# Patient Record
Sex: FEMALE | Race: WHITE | NOT HISPANIC OR LATINO | Employment: OTHER | URBAN - METROPOLITAN AREA
[De-identification: names, ages, dates, MRNs, and addresses within clinical notes are randomized per-mention and may not be internally consistent; named-entity substitution may affect disease eponyms.]

---

## 2017-03-22 ENCOUNTER — TRANSCRIBE ORDERS (OUTPATIENT)
Dept: LAB | Facility: CLINIC | Age: 82
End: 2017-03-22

## 2017-03-22 ENCOUNTER — APPOINTMENT (OUTPATIENT)
Dept: LAB | Facility: CLINIC | Age: 82
End: 2017-03-22
Payer: MEDICARE

## 2017-03-22 DIAGNOSIS — I10 ESSENTIAL HYPERTENSION, MALIGNANT: Primary | ICD-10-CM

## 2017-03-22 DIAGNOSIS — E55.9 UNSPECIFIED VITAMIN D DEFICIENCY: ICD-10-CM

## 2017-03-22 DIAGNOSIS — E78.5 HYPERLIPIDEMIA, UNSPECIFIED HYPERLIPIDEMIA TYPE: ICD-10-CM

## 2017-03-22 DIAGNOSIS — Z79.899 ENCOUNTER FOR LONG-TERM (CURRENT) USE OF OTHER MEDICATIONS: ICD-10-CM

## 2017-03-22 LAB
25(OH)D3 SERPL-MCNC: 34.6 NG/ML (ref 30–100)
ALBUMIN SERPL BCP-MCNC: 3.9 G/DL (ref 3.5–5)
ALP SERPL-CCNC: 50 U/L (ref 46–116)
ALT SERPL W P-5'-P-CCNC: 26 U/L (ref 12–78)
ANION GAP SERPL CALCULATED.3IONS-SCNC: 6 MMOL/L (ref 4–13)
AST SERPL W P-5'-P-CCNC: 24 U/L (ref 5–45)
BASOPHILS # BLD AUTO: 0.03 THOUSANDS/ΜL (ref 0–0.1)
BASOPHILS NFR BLD AUTO: 0 % (ref 0–1)
BILIRUB SERPL-MCNC: 0.49 MG/DL (ref 0.2–1)
BUN SERPL-MCNC: 18 MG/DL (ref 5–25)
CALCIUM SERPL-MCNC: 9.1 MG/DL (ref 8.3–10.1)
CHLORIDE SERPL-SCNC: 106 MMOL/L (ref 100–108)
CHOLEST SERPL-MCNC: 160 MG/DL (ref 50–200)
CO2 SERPL-SCNC: 29 MMOL/L (ref 21–32)
CREAT SERPL-MCNC: 0.55 MG/DL (ref 0.6–1.3)
EOSINOPHIL # BLD AUTO: 0.11 THOUSAND/ΜL (ref 0–0.61)
EOSINOPHIL NFR BLD AUTO: 2 % (ref 0–6)
ERYTHROCYTE [DISTWIDTH] IN BLOOD BY AUTOMATED COUNT: 14.5 % (ref 11.6–15.1)
GFR SERPL CREATININE-BSD FRML MDRD: >60 ML/MIN/1.73SQ M
GLUCOSE P FAST SERPL-MCNC: 85 MG/DL (ref 65–99)
HCT VFR BLD AUTO: 40.2 % (ref 34.8–46.1)
HDLC SERPL-MCNC: 55 MG/DL (ref 40–60)
HGB BLD-MCNC: 13.4 G/DL (ref 11.5–15.4)
LDLC SERPL CALC-MCNC: 93 MG/DL (ref 0–100)
LYMPHOCYTES # BLD AUTO: 1.45 THOUSANDS/ΜL (ref 0.6–4.47)
LYMPHOCYTES NFR BLD AUTO: 20 % (ref 14–44)
MCH RBC QN AUTO: 31.6 PG (ref 26.8–34.3)
MCHC RBC AUTO-ENTMCNC: 33.3 G/DL (ref 31.4–37.4)
MCV RBC AUTO: 95 FL (ref 82–98)
MONOCYTES # BLD AUTO: 0.72 THOUSAND/ΜL (ref 0.17–1.22)
MONOCYTES NFR BLD AUTO: 10 % (ref 4–12)
NEUTROPHILS # BLD AUTO: 5.09 THOUSANDS/ΜL (ref 1.85–7.62)
NEUTS SEG NFR BLD AUTO: 68 % (ref 43–75)
NRBC BLD AUTO-RTO: 0 /100 WBCS
PLATELET # BLD AUTO: 228 THOUSANDS/UL (ref 149–390)
PMV BLD AUTO: 11.1 FL (ref 8.9–12.7)
POTASSIUM SERPL-SCNC: 4.1 MMOL/L (ref 3.5–5.3)
PROT SERPL-MCNC: 7.1 G/DL (ref 6.4–8.2)
RBC # BLD AUTO: 4.24 MILLION/UL (ref 3.81–5.12)
SODIUM SERPL-SCNC: 141 MMOL/L (ref 136–145)
TRIGL SERPL-MCNC: 61 MG/DL
WBC # BLD AUTO: 7.42 THOUSAND/UL (ref 4.31–10.16)

## 2017-03-22 PROCEDURE — 36415 COLL VENOUS BLD VENIPUNCTURE: CPT

## 2017-03-22 PROCEDURE — 80053 COMPREHEN METABOLIC PANEL: CPT

## 2017-03-22 PROCEDURE — 82306 VITAMIN D 25 HYDROXY: CPT

## 2017-03-22 PROCEDURE — 80061 LIPID PANEL: CPT

## 2017-03-22 PROCEDURE — 85025 COMPLETE CBC W/AUTO DIFF WBC: CPT

## 2018-06-18 ENCOUNTER — TRANSCRIBE ORDERS (OUTPATIENT)
Dept: LAB | Facility: CLINIC | Age: 83
End: 2018-06-18

## 2018-06-18 ENCOUNTER — APPOINTMENT (OUTPATIENT)
Dept: LAB | Facility: CLINIC | Age: 83
End: 2018-06-18
Payer: MEDICARE

## 2018-06-18 DIAGNOSIS — E55.9 AVITAMINOSIS D: ICD-10-CM

## 2018-06-18 DIAGNOSIS — R74.8 ACID PHOSPHATASE ELEVATED: ICD-10-CM

## 2018-06-18 DIAGNOSIS — I10 ESSENTIAL HYPERTENSION, MALIGNANT: ICD-10-CM

## 2018-06-18 DIAGNOSIS — I10 ESSENTIAL HYPERTENSION, MALIGNANT: Primary | ICD-10-CM

## 2018-06-18 LAB
25(OH)D3 SERPL-MCNC: 48.7 NG/ML (ref 30–100)
ALBUMIN SERPL BCP-MCNC: 4 G/DL (ref 3.5–5)
ALP SERPL-CCNC: 49 U/L (ref 46–116)
ALT SERPL W P-5'-P-CCNC: 23 U/L (ref 12–78)
ANION GAP SERPL CALCULATED.3IONS-SCNC: 5 MMOL/L (ref 4–13)
AST SERPL W P-5'-P-CCNC: 21 U/L (ref 5–45)
BASOPHILS # BLD AUTO: 0.02 THOUSANDS/ΜL (ref 0–0.1)
BASOPHILS NFR BLD AUTO: 0 % (ref 0–1)
BILIRUB SERPL-MCNC: 0.43 MG/DL (ref 0.2–1)
BUN SERPL-MCNC: 17 MG/DL (ref 5–25)
CALCIUM SERPL-MCNC: 8.6 MG/DL (ref 8.3–10.1)
CHLORIDE SERPL-SCNC: 106 MMOL/L (ref 100–108)
CHOLEST SERPL-MCNC: 153 MG/DL (ref 50–200)
CO2 SERPL-SCNC: 28 MMOL/L (ref 21–32)
CREAT SERPL-MCNC: 0.63 MG/DL (ref 0.6–1.3)
EOSINOPHIL # BLD AUTO: 0.06 THOUSAND/ΜL (ref 0–0.61)
EOSINOPHIL NFR BLD AUTO: 1 % (ref 0–6)
ERYTHROCYTE [DISTWIDTH] IN BLOOD BY AUTOMATED COUNT: 14.5 % (ref 11.6–15.1)
EST. AVERAGE GLUCOSE BLD GHB EST-MCNC: 128 MG/DL
GFR SERPL CREATININE-BSD FRML MDRD: 83 ML/MIN/1.73SQ M
GLUCOSE P FAST SERPL-MCNC: 85 MG/DL (ref 65–99)
HBA1C MFR BLD: 6.1 % (ref 4.2–6.3)
HCT VFR BLD AUTO: 40.7 % (ref 34.8–46.1)
HDLC SERPL-MCNC: 52 MG/DL (ref 40–60)
HGB BLD-MCNC: 12.6 G/DL (ref 11.5–15.4)
IMM GRANULOCYTES # BLD AUTO: 0.02 THOUSAND/UL (ref 0–0.2)
IMM GRANULOCYTES NFR BLD AUTO: 0 % (ref 0–2)
LDLC SERPL CALC-MCNC: 90 MG/DL (ref 0–100)
LYMPHOCYTES # BLD AUTO: 1.37 THOUSANDS/ΜL (ref 0.6–4.47)
LYMPHOCYTES NFR BLD AUTO: 19 % (ref 14–44)
MCH RBC QN AUTO: 31 PG (ref 26.8–34.3)
MCHC RBC AUTO-ENTMCNC: 31 G/DL (ref 31.4–37.4)
MCV RBC AUTO: 100 FL (ref 82–98)
MONOCYTES # BLD AUTO: 0.65 THOUSAND/ΜL (ref 0.17–1.22)
MONOCYTES NFR BLD AUTO: 9 % (ref 4–12)
NEUTROPHILS # BLD AUTO: 5.13 THOUSANDS/ΜL (ref 1.85–7.62)
NEUTS SEG NFR BLD AUTO: 71 % (ref 43–75)
NONHDLC SERPL-MCNC: 101 MG/DL
NRBC BLD AUTO-RTO: 0 /100 WBCS
PLATELET # BLD AUTO: 191 THOUSANDS/UL (ref 149–390)
PMV BLD AUTO: 11.3 FL (ref 8.9–12.7)
POTASSIUM SERPL-SCNC: 4 MMOL/L (ref 3.5–5.3)
PROT SERPL-MCNC: 7 G/DL (ref 6.4–8.2)
RBC # BLD AUTO: 4.07 MILLION/UL (ref 3.81–5.12)
SODIUM SERPL-SCNC: 139 MMOL/L (ref 136–145)
TRIGL SERPL-MCNC: 56 MG/DL
WBC # BLD AUTO: 7.25 THOUSAND/UL (ref 4.31–10.16)

## 2018-06-18 PROCEDURE — 80053 COMPREHEN METABOLIC PANEL: CPT

## 2018-06-18 PROCEDURE — 80061 LIPID PANEL: CPT

## 2018-06-18 PROCEDURE — 86803 HEPATITIS C AB TEST: CPT

## 2018-06-18 PROCEDURE — 82306 VITAMIN D 25 HYDROXY: CPT

## 2018-06-18 PROCEDURE — 85025 COMPLETE CBC W/AUTO DIFF WBC: CPT

## 2018-06-18 PROCEDURE — 36415 COLL VENOUS BLD VENIPUNCTURE: CPT

## 2018-06-18 PROCEDURE — 83036 HEMOGLOBIN GLYCOSYLATED A1C: CPT

## 2018-06-19 LAB — HCV AB SER QL: NORMAL

## 2019-05-28 ENCOUNTER — TRANSCRIBE ORDERS (OUTPATIENT)
Dept: ADMINISTRATIVE | Facility: HOSPITAL | Age: 84
End: 2019-05-28

## 2019-05-28 DIAGNOSIS — K21.9 GASTROESOPHAGEAL REFLUX DISEASE WITHOUT ESOPHAGITIS: ICD-10-CM

## 2019-05-28 DIAGNOSIS — R13.10 DYSPHAGIA, UNSPECIFIED TYPE: Primary | ICD-10-CM

## 2019-05-28 DIAGNOSIS — R63.4 ABNORMAL WEIGHT LOSS: ICD-10-CM

## 2019-06-03 ENCOUNTER — HOSPITAL ENCOUNTER (OUTPATIENT)
Dept: RADIOLOGY | Facility: HOSPITAL | Age: 84
Discharge: HOME | End: 2019-06-03
Attending: PHYSICIAN ASSISTANT
Payer: MEDICARE

## 2019-06-03 DIAGNOSIS — R13.10 DYSPHAGIA, UNSPECIFIED TYPE: ICD-10-CM

## 2019-06-03 PROCEDURE — 74220 X-RAY XM ESOPHAGUS 1CNTRST: CPT

## 2019-06-11 ENCOUNTER — TRANSCRIBE ORDERS (OUTPATIENT)
Dept: LAB | Facility: CLINIC | Age: 84
End: 2019-06-11

## 2019-06-11 ENCOUNTER — APPOINTMENT (OUTPATIENT)
Dept: LAB | Facility: CLINIC | Age: 84
End: 2019-06-11
Payer: MEDICARE

## 2019-06-11 ENCOUNTER — ANESTHESIA EVENT (OUTPATIENT)
Dept: GASTROENTEROLOGY | Facility: HOSPITAL | Age: 84
End: 2019-06-11

## 2019-06-11 DIAGNOSIS — R13.10 DYSPHAGIA, UNSPECIFIED TYPE: Primary | ICD-10-CM

## 2019-06-11 DIAGNOSIS — K21.9 GASTROESOPHAGEAL REFLUX DISEASE WITHOUT ESOPHAGITIS: ICD-10-CM

## 2019-06-11 DIAGNOSIS — K44.9 DIAPHRAGMATIC HERNIA WITHOUT OBSTRUCTION AND WITHOUT GANGRENE: ICD-10-CM

## 2019-06-11 DIAGNOSIS — R63.4 ABNORMAL WEIGHT LOSS: ICD-10-CM

## 2019-06-11 LAB
ALBUMIN SERPL BCP-MCNC: 4.1 G/DL (ref 3.5–5)
ALP SERPL-CCNC: 48 U/L (ref 46–116)
ALT SERPL W P-5'-P-CCNC: 22 U/L (ref 12–78)
ANION GAP SERPL CALCULATED.3IONS-SCNC: 4 MMOL/L (ref 4–13)
AST SERPL W P-5'-P-CCNC: 21 U/L (ref 5–45)
BASOPHILS # BLD AUTO: 0.02 THOUSANDS/ΜL (ref 0–0.1)
BASOPHILS NFR BLD AUTO: 0 % (ref 0–1)
BILIRUB SERPL-MCNC: 0.47 MG/DL (ref 0.2–1)
BUN SERPL-MCNC: 8 MG/DL (ref 5–25)
CALCIUM SERPL-MCNC: 8.9 MG/DL (ref 8.3–10.1)
CHLORIDE SERPL-SCNC: 104 MMOL/L (ref 100–108)
CO2 SERPL-SCNC: 29 MMOL/L (ref 21–32)
CREAT SERPL-MCNC: 0.52 MG/DL (ref 0.6–1.3)
EOSINOPHIL # BLD AUTO: 0.02 THOUSAND/ΜL (ref 0–0.61)
EOSINOPHIL NFR BLD AUTO: 0 % (ref 0–6)
ERYTHROCYTE [DISTWIDTH] IN BLOOD BY AUTOMATED COUNT: 14.3 % (ref 11.6–15.1)
GFR SERPL CREATININE-BSD FRML MDRD: 87 ML/MIN/1.73SQ M
GLUCOSE SERPL-MCNC: 95 MG/DL (ref 65–140)
HCT VFR BLD AUTO: 41.5 % (ref 34.8–46.1)
HGB BLD-MCNC: 13.4 G/DL (ref 11.5–15.4)
IMM GRANULOCYTES # BLD AUTO: 0.01 THOUSAND/UL (ref 0–0.2)
IMM GRANULOCYTES NFR BLD AUTO: 0 % (ref 0–2)
LIPASE SERPL-CCNC: 126 U/L (ref 73–393)
LYMPHOCYTES # BLD AUTO: 1.19 THOUSANDS/ΜL (ref 0.6–4.47)
LYMPHOCYTES NFR BLD AUTO: 23 % (ref 14–44)
MCH RBC QN AUTO: 31.4 PG (ref 26.8–34.3)
MCHC RBC AUTO-ENTMCNC: 32.3 G/DL (ref 31.4–37.4)
MCV RBC AUTO: 97 FL (ref 82–98)
MONOCYTES # BLD AUTO: 0.46 THOUSAND/ΜL (ref 0.17–1.22)
MONOCYTES NFR BLD AUTO: 9 % (ref 4–12)
NEUTROPHILS # BLD AUTO: 3.53 THOUSANDS/ΜL (ref 1.85–7.62)
NEUTS SEG NFR BLD AUTO: 68 % (ref 43–75)
NRBC BLD AUTO-RTO: 0 /100 WBCS
PLATELET # BLD AUTO: 203 THOUSANDS/UL (ref 149–390)
PMV BLD AUTO: 10.7 FL (ref 8.9–12.7)
POTASSIUM SERPL-SCNC: 3.6 MMOL/L (ref 3.5–5.3)
PROT SERPL-MCNC: 7.3 G/DL (ref 6.4–8.2)
RBC # BLD AUTO: 4.27 MILLION/UL (ref 3.81–5.12)
SODIUM SERPL-SCNC: 137 MMOL/L (ref 136–145)
WBC # BLD AUTO: 5.23 THOUSAND/UL (ref 4.31–10.16)

## 2019-06-11 PROCEDURE — 80053 COMPREHEN METABOLIC PANEL: CPT

## 2019-06-11 PROCEDURE — 85025 COMPLETE CBC W/AUTO DIFF WBC: CPT

## 2019-06-11 PROCEDURE — 83690 ASSAY OF LIPASE: CPT

## 2019-06-11 PROCEDURE — 36415 COLL VENOUS BLD VENIPUNCTURE: CPT

## 2019-06-11 RX ORDER — FAMOTIDINE 20 MG/1
20 TABLET, FILM COATED ORAL
COMMUNITY
End: 2019-06-13 | Stop reason: HOSPADM

## 2019-06-11 RX ORDER — OMEPRAZOLE 20 MG/1
20 CAPSULE, DELAYED RELEASE ORAL EVERY MORNING
COMMUNITY

## 2019-06-13 ENCOUNTER — ANESTHESIA (OUTPATIENT)
Dept: GASTROENTEROLOGY | Facility: HOSPITAL | Age: 84
End: 2019-06-13

## 2019-06-13 ENCOUNTER — HOSPITAL ENCOUNTER (OUTPATIENT)
Dept: GASTROENTEROLOGY | Facility: HOSPITAL | Age: 84
Setting detail: OUTPATIENT SURGERY
Discharge: HOME | End: 2019-06-13
Attending: INTERNAL MEDICINE | Admitting: INTERNAL MEDICINE
Payer: MEDICARE

## 2019-06-13 VITALS
SYSTOLIC BLOOD PRESSURE: 164 MMHG | BODY MASS INDEX: 18.61 KG/M2 | WEIGHT: 109 LBS | HEART RATE: 74 BPM | OXYGEN SATURATION: 98 % | HEIGHT: 64 IN | TEMPERATURE: 97.1 F | DIASTOLIC BLOOD PRESSURE: 74 MMHG | RESPIRATION RATE: 18 BRPM

## 2019-06-13 DIAGNOSIS — R13.10 DYSPHAGIA: ICD-10-CM

## 2019-06-13 DIAGNOSIS — R63.4 ABNORMAL WEIGHT LOSS: ICD-10-CM

## 2019-06-13 PROCEDURE — 88305 TISSUE EXAM BY PATHOLOGIST: CPT | Performed by: PATHOLOGY

## 2019-06-13 PROCEDURE — 1123F ACP DISCUSS/DSCN MKR DOCD: CPT | Performed by: PATHOLOGY

## 2019-06-13 RX ORDER — SODIUM CHLORIDE, SODIUM LACTATE, POTASSIUM CHLORIDE, CALCIUM CHLORIDE 600; 310; 30; 20 MG/100ML; MG/100ML; MG/100ML; MG/100ML
125 INJECTION, SOLUTION INTRAVENOUS CONTINUOUS
Status: DISCONTINUED | OUTPATIENT
Start: 2019-06-13 | End: 2019-06-17 | Stop reason: HOSPADM

## 2019-06-13 RX ORDER — LIDOCAINE HYDROCHLORIDE 20 MG/ML
INJECTION, SOLUTION EPIDURAL; INFILTRATION; INTRACAUDAL; PERINEURAL AS NEEDED
Status: DISCONTINUED | OUTPATIENT
Start: 2019-06-13 | End: 2019-06-13 | Stop reason: SURG

## 2019-06-13 RX ORDER — PROPOFOL 10 MG/ML
INJECTION, EMULSION INTRAVENOUS AS NEEDED
Status: DISCONTINUED | OUTPATIENT
Start: 2019-06-13 | End: 2019-06-13 | Stop reason: SURG

## 2019-06-13 RX ADMIN — PROPOFOL 100 MG: 10 INJECTION, EMULSION INTRAVENOUS at 13:42

## 2019-06-13 RX ADMIN — LIDOCAINE HYDROCHLORIDE 100 MG: 20 INJECTION, SOLUTION EPIDURAL; INFILTRATION; INTRACAUDAL; PERINEURAL at 13:41

## 2019-06-13 RX ADMIN — SODIUM CHLORIDE, POTASSIUM CHLORIDE, SODIUM LACTATE AND CALCIUM CHLORIDE 125 ML/HR: 600; 310; 30; 20 INJECTION, SOLUTION INTRAVENOUS at 12:04

## 2019-06-13 RX ADMIN — PROPOFOL 50 MG: 10 INJECTION, EMULSION INTRAVENOUS at 13:45

## 2019-06-19 ENCOUNTER — HOSPITAL ENCOUNTER (OUTPATIENT)
Dept: CT IMAGING | Facility: HOSPITAL | Age: 84
Discharge: HOME | End: 2019-06-19
Attending: PHYSICIAN ASSISTANT
Payer: MEDICARE

## 2019-06-19 DIAGNOSIS — R13.10 DYSPHAGIA, UNSPECIFIED TYPE: ICD-10-CM

## 2019-06-19 DIAGNOSIS — R63.4 ABNORMAL WEIGHT LOSS: ICD-10-CM

## 2019-06-19 DIAGNOSIS — K21.9 GASTROESOPHAGEAL REFLUX DISEASE WITHOUT ESOPHAGITIS: ICD-10-CM

## 2019-06-19 PROCEDURE — 74178 CT ABD&PLV WO CNTR FLWD CNTR: CPT

## 2019-06-19 RX ADMIN — IOHEXOL 100 ML: 350 INJECTION, SOLUTION INTRAVENOUS at 13:39

## 2019-07-02 ENCOUNTER — APPOINTMENT (OUTPATIENT)
Dept: LAB | Facility: CLINIC | Age: 84
End: 2019-07-02
Payer: MEDICARE

## 2019-07-02 ENCOUNTER — TRANSCRIBE ORDERS (OUTPATIENT)
Dept: LAB | Facility: CLINIC | Age: 84
End: 2019-07-02

## 2019-07-02 DIAGNOSIS — Z79.899 ENCOUNTER FOR LONG-TERM (CURRENT) USE OF OTHER MEDICATIONS: ICD-10-CM

## 2019-07-02 DIAGNOSIS — R71.8 ELEVATED MCV: ICD-10-CM

## 2019-07-02 DIAGNOSIS — E78.5 HYPERLIPIDEMIA, UNSPECIFIED HYPERLIPIDEMIA TYPE: ICD-10-CM

## 2019-07-02 DIAGNOSIS — R73.9 HYPERGLYCEMIA: ICD-10-CM

## 2019-07-02 DIAGNOSIS — Z79.899 ENCOUNTER FOR LONG-TERM (CURRENT) USE OF OTHER MEDICATIONS: Primary | ICD-10-CM

## 2019-07-02 DIAGNOSIS — E55.9 AVITAMINOSIS D: ICD-10-CM

## 2019-07-02 LAB
25(OH)D3 SERPL-MCNC: 45 NG/ML (ref 30–100)
ALBUMIN SERPL BCP-MCNC: 4.2 G/DL (ref 3.5–5)
ALP SERPL-CCNC: 49 U/L (ref 46–116)
ALT SERPL W P-5'-P-CCNC: 30 U/L (ref 12–78)
ANION GAP SERPL CALCULATED.3IONS-SCNC: 7 MMOL/L (ref 4–13)
AST SERPL W P-5'-P-CCNC: 22 U/L (ref 5–45)
BILIRUB SERPL-MCNC: 0.38 MG/DL (ref 0.2–1)
BUN SERPL-MCNC: 7 MG/DL (ref 5–25)
CALCIUM SERPL-MCNC: 9.2 MG/DL (ref 8.3–10.1)
CHLORIDE SERPL-SCNC: 105 MMOL/L (ref 100–108)
CHOLEST SERPL-MCNC: 160 MG/DL (ref 50–200)
CO2 SERPL-SCNC: 29 MMOL/L (ref 21–32)
CREAT SERPL-MCNC: 0.55 MG/DL (ref 0.6–1.3)
ERYTHROCYTE [DISTWIDTH] IN BLOOD BY AUTOMATED COUNT: 14.7 % (ref 11.6–15.1)
EST. AVERAGE GLUCOSE BLD GHB EST-MCNC: 120 MG/DL
FERRITIN SERPL-MCNC: 309 NG/ML (ref 8–388)
FOLATE SERPL-MCNC: >20 NG/ML (ref 3.1–17.5)
GFR SERPL CREATININE-BSD FRML MDRD: 86 ML/MIN/1.73SQ M
GLUCOSE P FAST SERPL-MCNC: 89 MG/DL (ref 65–99)
HBA1C MFR BLD: 5.8 % (ref 4.2–6.3)
HCT VFR BLD AUTO: 42.4 % (ref 34.8–46.1)
HDLC SERPL-MCNC: 60 MG/DL (ref 40–60)
HGB BLD-MCNC: 13.6 G/DL (ref 11.5–15.4)
IRON SERPL-MCNC: 79 UG/DL (ref 50–170)
LDLC SERPL CALC-MCNC: 86 MG/DL (ref 0–100)
MCH RBC QN AUTO: 31.9 PG (ref 26.8–34.3)
MCHC RBC AUTO-ENTMCNC: 32.1 G/DL (ref 31.4–37.4)
MCV RBC AUTO: 99 FL (ref 82–98)
NONHDLC SERPL-MCNC: 100 MG/DL
PLATELET # BLD AUTO: 225 THOUSANDS/UL (ref 149–390)
PMV BLD AUTO: 11.7 FL (ref 8.9–12.7)
POTASSIUM SERPL-SCNC: 3.7 MMOL/L (ref 3.5–5.3)
PROT SERPL-MCNC: 7.3 G/DL (ref 6.4–8.2)
RBC # BLD AUTO: 4.27 MILLION/UL (ref 3.81–5.12)
RETICS # AUTO: NORMAL 10*3/UL (ref 14097–95744)
RETICS # CALC: 1.16 % (ref 0.37–1.87)
SODIUM SERPL-SCNC: 141 MMOL/L (ref 136–145)
TIBC SERPL-MCNC: 298 UG/DL (ref 250–450)
TRIGL SERPL-MCNC: 70 MG/DL
TSH SERPL DL<=0.05 MIU/L-ACNC: 2.86 UIU/ML (ref 0.36–3.74)
VIT B12 SERPL-MCNC: 258 PG/ML (ref 100–900)
WBC # BLD AUTO: 5.55 THOUSAND/UL (ref 4.31–10.16)

## 2019-07-02 PROCEDURE — 80061 LIPID PANEL: CPT

## 2019-07-02 PROCEDURE — 36415 COLL VENOUS BLD VENIPUNCTURE: CPT

## 2019-07-02 PROCEDURE — 80053 COMPREHEN METABOLIC PANEL: CPT

## 2019-07-02 PROCEDURE — 84443 ASSAY THYROID STIM HORMONE: CPT

## 2019-07-02 PROCEDURE — 85045 AUTOMATED RETICULOCYTE COUNT: CPT

## 2019-07-02 PROCEDURE — 82728 ASSAY OF FERRITIN: CPT

## 2019-07-02 PROCEDURE — 82607 VITAMIN B-12: CPT

## 2019-07-02 PROCEDURE — 83540 ASSAY OF IRON: CPT

## 2019-07-02 PROCEDURE — 82747 ASSAY OF FOLIC ACID RBC: CPT

## 2019-07-02 PROCEDURE — 83036 HEMOGLOBIN GLYCOSYLATED A1C: CPT

## 2019-07-02 PROCEDURE — 82306 VITAMIN D 25 HYDROXY: CPT

## 2019-07-02 PROCEDURE — 85027 COMPLETE CBC AUTOMATED: CPT

## 2019-07-02 PROCEDURE — 83550 IRON BINDING TEST: CPT

## 2019-07-02 PROCEDURE — 82746 ASSAY OF FOLIC ACID SERUM: CPT

## 2019-07-04 LAB
FOLATE BLD-MCNC: 319.2 NG/ML
FOLATE RBC-MCNC: 812 NG/ML
HCT VFR BLD AUTO: 39.3 % (ref 34–46.6)

## 2019-07-20 ENCOUNTER — HOSPITAL ENCOUNTER (EMERGENCY)
Facility: HOSPITAL | Age: 84
Discharge: HOME | End: 2019-07-20
Attending: EMERGENCY MEDICINE | Admitting: EMERGENCY MEDICINE
Payer: MEDICARE

## 2019-07-20 VITALS
WEIGHT: 107.25 LBS | DIASTOLIC BLOOD PRESSURE: 89 MMHG | RESPIRATION RATE: 16 BRPM | OXYGEN SATURATION: 99 % | BODY MASS INDEX: 18.31 KG/M2 | HEART RATE: 78 BPM | HEIGHT: 64 IN | TEMPERATURE: 99.3 F | SYSTOLIC BLOOD PRESSURE: 172 MMHG

## 2019-07-20 DIAGNOSIS — M54.32 SCIATICA, LEFT SIDE: Primary | ICD-10-CM

## 2019-07-20 PROCEDURE — 96372 THER/PROPH/DIAG INJ SC/IM: CPT

## 2019-07-20 PROCEDURE — 99283 EMERGENCY DEPT VISIT LOW MDM: CPT

## 2019-07-20 RX ORDER — METHOCARBAMOL 500 MG/1
500 TABLET, FILM COATED ORAL 3 TIMES DAILY PRN
Qty: 20 TABLET | Refills: 0 | Status: ON HOLD | OUTPATIENT
Start: 2019-07-20 | End: 2021-01-21

## 2019-07-20 RX ORDER — METHOCARBAMOL 500 MG/1
500 TABLET, FILM COATED ORAL ONCE
Status: COMPLETED | OUTPATIENT
Start: 2019-07-20 | End: 2019-07-20

## 2019-07-20 RX ORDER — IBUPROFEN 600 MG/1
600 TABLET ORAL ONCE
Status: DISCONTINUED | OUTPATIENT
Start: 2019-07-20 | End: 2019-07-20

## 2019-07-20 RX ORDER — IBUPROFEN 400 MG/1
400 TABLET ORAL EVERY 6 HOURS PRN
Qty: 30 TABLET | Refills: 0 | Status: ON HOLD | OUTPATIENT
Start: 2019-07-20 | End: 2021-01-21

## 2019-07-20 RX ORDER — KETOROLAC TROMETHAMINE 30 MG/ML
15 INJECTION, SOLUTION INTRAMUSCULAR; INTRAVENOUS ONCE
Status: COMPLETED | OUTPATIENT
Start: 2019-07-20 | End: 2019-07-20

## 2019-07-20 RX ADMIN — KETOROLAC TROMETHAMINE 15 MG: 30 INJECTION, SOLUTION INTRAMUSCULAR; INTRAVENOUS at 11:15

## 2019-07-20 RX ADMIN — METHOCARBAMOL 500 MG: 500 TABLET, FILM COATED ORAL at 11:15

## 2019-07-20 NOTE — ED PROVIDER NOTES
History  Chief Complaint   Patient presents with    Tailbone Pain     Pt states pain in her tailbone, started about a month ago and getting worse     Patient is an 80-year-old female presents the emergency department complaining of pain in the left SI joint radiating down the left posterior thigh started about 1 month ago still present worsening no numbness no weakness no fall no traumatic injury pain is worse with movement  Patient denies any loss of bladder or bowel urinary retention  Back Pain   Location:  Lumbar spine  Quality:  Aching  Radiates to:  L posterior upper leg  Pain severity:  Moderate  Pain is: Worse during the day  Onset quality:  Gradual  Duration:  1 month  Timing:  Intermittent  Progression:  Waxing and waning  Chronicity:  New  Associated symptoms: no abdominal pain, no chest pain, no dysuria, no fever, no headaches, no numbness and no weakness        Prior to Admission Medications   Prescriptions Last Dose Informant Patient Reported? Taking? Multiple Vitamins-Minerals (PRESERVISION AREDS PO)   Yes No   Sig: Take 1 caplet by mouth 2 (two) times a day   omeprazole (PriLOSEC) 20 mg delayed release capsule   Yes No   Sig: Take 20 mg by mouth every morning      Facility-Administered Medications: None       Past Medical History:   Diagnosis Date    Dysphagia     GERD (gastroesophageal reflux disease)        Past Surgical History:   Procedure Laterality Date    [facility]CTION [facility]     [facility]    CHOLECYSTECTOMY  2015    EGD      x2 and was dilated       Family History   Family history unknown: Yes     I have reviewed and agree with the history as documented  Social History     Tobacco Use    Smoking status: Never Smoker    Smokeless tobacco: Never Used   Substance Use Topics    Alcohol use: Not Currently    Drug use: Never        Review of Systems   Constitutional: Negative for activity change, appetite change, chills, fatigue and fever     HENT: Negative for congestion, ear pain, rhinorrhea and sore throat  Eyes: Negative for discharge, redness and visual disturbance  Respiratory: Negative for cough, chest tightness, shortness of breath and wheezing  Cardiovascular: Negative for chest pain and palpitations  Gastrointestinal: Negative for abdominal pain, constipation, diarrhea, nausea and vomiting  Endocrine: Negative for polydipsia and polyuria  Genitourinary: Negative for difficulty urinating, dysuria, frequency, hematuria and urgency  Musculoskeletal: Positive for back pain  Negative for arthralgias and myalgias  Skin: Negative for color change, pallor and rash  Neurological: Negative for dizziness, weakness, light-headedness, numbness and headaches  Hematological: Negative for adenopathy  Does not bruise/bleed easily  All other systems reviewed and are negative  Physical Exam  Physical Exam   Constitutional: She is oriented to person, place, and time  She appears well-developed and well-nourished  HENT:   Head: Normocephalic and atraumatic  Right Ear: External ear normal    Left Ear: External ear normal    Nose: Nose normal    Mouth/Throat: Oropharynx is clear and moist    Eyes: Pupils are equal, round, and reactive to light  Conjunctivae and EOM are normal    Neck: Normal range of motion  Neck supple  Cardiovascular: Normal rate, regular rhythm, normal heart sounds and intact distal pulses  Pulmonary/Chest: Effort normal and breath sounds normal  No respiratory distress  She has no wheezes  She has no rales  She exhibits no tenderness  Abdominal: Soft  Bowel sounds are normal  She exhibits no distension  There is no tenderness  There is no guarding  Musculoskeletal:        Lumbar back: She exhibits tenderness, pain and spasm  She exhibits normal range of motion  Neurological: She is alert and oriented to person, place, and time  No cranial nerve deficit or sensory deficit  Skin: Skin is warm and dry     Psychiatric: She has a normal mood and affect  Nursing note and vitals reviewed  Vital Signs  ED Triage Vitals [07/20/19 1050]   Temperature Pulse Respirations Blood Pressure SpO2   99 3 °F (37 4 °C) 84 18 (!) 172/89 98 %      Temp Source Heart Rate Source Patient Position - Orthostatic VS BP Location FiO2 (%)   Tympanic Monitor Standing Right arm --      Pain Score       Worst Possible Pain           Vitals:    07/20/19 1050   BP: (!) 172/89   Pulse: 84   Patient Position - Orthostatic VS: Standing         Visual Acuity      ED Medications  Medications   methocarbamol (ROBAXIN) tablet 500 mg (has no administration in time range)   ketorolac (TORADOL) injection 15 mg (has no administration in time range)       Diagnostic Studies  Results Reviewed     None                 No orders to display              Procedures  Procedures       ED Course                               MDM  Number of Diagnoses or Management Options  Sciatica, left side: new and does not require workup  Diagnosis management comments: History and clinical examination the emergency department is consistent with ongoing left-sided sciatica  No alarm features of back pain present to necessitate emergent neuroimaging the spine at this time  I advised NSAIDs and muscle relaxers for now rest and supportive care and prompt follow-up with primary physician and Orthopedics for further evaluation treatment return precautions and anticipatory guidance discussed        Risk of Complications, Morbidity, and/or Mortality  Presenting problems: low  Management options: low    Patient Progress  Patient progress: stable      Disposition  Final diagnoses:   Sciatica, left side     Time reflects when diagnosis was documented in both MDM as applicable and the Disposition within this note     Time User Action Codes Description Comment    7/20/2019 10:59 AM Ceci Schmidt Add [M54 32] Sciatica, left side       ED Disposition     ED Disposition Condition Date/Time Comment Discharge Stable Sat Jul 20, 2019 10:59 AM Delmy Longoria discharge to home/self care  Follow-up Information     Follow up With Specialties Details Why Contact Info    Juan Duarte MD Internal Medicine, Emergency Medicine Schedule an appointment as soon as possible for a visit in 3 days  [address] [phone]      Elise Longoria DO Orthopedic Surgery Schedule an appointment as soon as possible for a visit in 3 days  [address]  [address] [facility] [address] 200  500 [facility] [address]            Patient's Medications   Discharge Prescriptions    IBUPROFEN (MOTRIN) 400 MG TABLET    Take 1 tablet (400 mg total) by mouth every 6 (six) hours as needed for mild pain or moderate pain       Start Date: 7/20/2019 End Date: --       Order Dose: 400 mg       Quantity: 30 tablet    Refills: 0    METHOCARBAMOL (ROBAXIN) 500 MG TABLET    Take 1 tablet (500 mg total) by mouth 3 (three) times a day as needed for muscle spasms for up to 20 doses       Start Date: 7/20/2019 End Date: --       Order Dose: 500 mg       Quantity: 20 tablet    Refills: 0     No discharge procedures on file      ED Provider  Electronically Signed by           Mariela Berger DO  07/20/19 1111

## 2019-07-25 ENCOUNTER — APPOINTMENT (OUTPATIENT)
Dept: RADIOLOGY | Facility: CLINIC | Age: 84
End: 2019-07-25
Payer: MEDICARE

## 2019-07-25 ENCOUNTER — TRANSCRIBE ORDERS (OUTPATIENT)
Dept: URGENT CARE | Facility: CLINIC | Age: 84
End: 2019-07-25

## 2019-07-25 DIAGNOSIS — M53.3 SACRAL PAIN: Primary | ICD-10-CM

## 2019-07-25 DIAGNOSIS — M53.3 SACRAL PAIN: ICD-10-CM

## 2019-07-25 PROCEDURE — 72110 X-RAY EXAM L-2 SPINE 4/>VWS: CPT

## 2020-01-06 ENCOUNTER — TRANSCRIBE ORDERS (OUTPATIENT)
Dept: LAB | Facility: CLINIC | Age: 85
End: 2020-01-06

## 2020-01-06 ENCOUNTER — APPOINTMENT (OUTPATIENT)
Dept: LAB | Facility: CLINIC | Age: 85
End: 2020-01-06
Payer: MEDICARE

## 2020-01-06 DIAGNOSIS — N39.0 URINARY TRACT INFECTION WITHOUT HEMATURIA, SITE UNSPECIFIED: Primary | ICD-10-CM

## 2020-01-06 DIAGNOSIS — N39.0 URINARY TRACT INFECTION WITHOUT HEMATURIA, SITE UNSPECIFIED: ICD-10-CM

## 2020-01-06 LAB
BACTERIA UR QL AUTO: NORMAL /HPF
BILIRUB UR QL STRIP: NEGATIVE
CLARITY UR: CLEAR
COLOR UR: YELLOW
GLUCOSE UR STRIP-MCNC: NEGATIVE MG/DL
HGB UR QL STRIP.AUTO: NEGATIVE
KETONES UR STRIP-MCNC: NEGATIVE MG/DL
LEUKOCYTE ESTERASE UR QL STRIP: NEGATIVE
NITRITE UR QL STRIP: NEGATIVE
NON-SQ EPI CELLS URNS QL MICRO: NORMAL /HPF
PH UR STRIP.AUTO: 7 [PH]
PROT UR STRIP-MCNC: NEGATIVE MG/DL
RBC #/AREA URNS AUTO: NORMAL /HPF
SP GR UR STRIP.AUTO: 1 (ref 1–1.03)
UROBILINOGEN UR QL STRIP.AUTO: 0.2 E.U./DL
WBC #/AREA URNS AUTO: NORMAL /HPF

## 2020-01-06 PROCEDURE — 81001 URINALYSIS AUTO W/SCOPE: CPT

## 2020-01-06 PROCEDURE — 87086 URINE CULTURE/COLONY COUNT: CPT

## 2020-01-07 LAB — BACTERIA UR CULT: NORMAL

## 2020-08-03 ENCOUNTER — APPOINTMENT (OUTPATIENT)
Dept: LAB | Facility: CLINIC | Age: 85
End: 2020-08-03
Payer: MEDICARE

## 2020-08-03 ENCOUNTER — TRANSCRIBE ORDERS (OUTPATIENT)
Dept: LAB | Facility: CLINIC | Age: 85
End: 2020-08-03

## 2020-08-03 DIAGNOSIS — I10 ESSENTIAL HYPERTENSION, MALIGNANT: ICD-10-CM

## 2020-08-03 DIAGNOSIS — Z79.899 ENCOUNTER FOR LONG-TERM (CURRENT) USE OF OTHER MEDICATIONS: ICD-10-CM

## 2020-08-03 DIAGNOSIS — E03.9 ACQUIRED HYPOTHYROIDISM: ICD-10-CM

## 2020-08-03 DIAGNOSIS — E55.9 AVITAMINOSIS D: ICD-10-CM

## 2020-08-03 DIAGNOSIS — I10 ESSENTIAL HYPERTENSION, MALIGNANT: Primary | ICD-10-CM

## 2020-08-03 LAB
25(OH)D3 SERPL-MCNC: 56.8 NG/ML (ref 30–100)
ALBUMIN SERPL BCP-MCNC: 3.9 G/DL (ref 3.5–5)
ALP SERPL-CCNC: 55 U/L (ref 46–116)
ALT SERPL W P-5'-P-CCNC: 19 U/L (ref 12–78)
ANION GAP SERPL CALCULATED.3IONS-SCNC: 6 MMOL/L (ref 4–13)
AST SERPL W P-5'-P-CCNC: 17 U/L (ref 5–45)
BILIRUB SERPL-MCNC: 0.43 MG/DL (ref 0.2–1)
BUN SERPL-MCNC: 13 MG/DL (ref 5–25)
CALCIUM SERPL-MCNC: 9.1 MG/DL (ref 8.3–10.1)
CHLORIDE SERPL-SCNC: 107 MMOL/L (ref 100–108)
CHOLEST SERPL-MCNC: 163 MG/DL (ref 50–200)
CO2 SERPL-SCNC: 28 MMOL/L (ref 21–32)
CREAT SERPL-MCNC: 0.53 MG/DL (ref 0.6–1.3)
ERYTHROCYTE [DISTWIDTH] IN BLOOD BY AUTOMATED COUNT: 15 % (ref 11.6–15.1)
EST. AVERAGE GLUCOSE BLD GHB EST-MCNC: 108 MG/DL
GFR SERPL CREATININE-BSD FRML MDRD: 86 ML/MIN/1.73SQ M
GLUCOSE P FAST SERPL-MCNC: 85 MG/DL (ref 65–99)
HBA1C MFR BLD: 5.4 %
HCT VFR BLD AUTO: 41 % (ref 34.8–46.1)
HDLC SERPL-MCNC: 61 MG/DL
HGB BLD-MCNC: 12.9 G/DL (ref 11.5–15.4)
LDLC SERPL CALC-MCNC: 88 MG/DL (ref 0–100)
MCH RBC QN AUTO: 31.9 PG (ref 26.8–34.3)
MCHC RBC AUTO-ENTMCNC: 31.5 G/DL (ref 31.4–37.4)
MCV RBC AUTO: 101 FL (ref 82–98)
NONHDLC SERPL-MCNC: 102 MG/DL
PLATELET # BLD AUTO: 165 THOUSANDS/UL (ref 149–390)
PMV BLD AUTO: 12.1 FL (ref 8.9–12.7)
POTASSIUM SERPL-SCNC: 3.9 MMOL/L (ref 3.5–5.3)
PROT SERPL-MCNC: 7.2 G/DL (ref 6.4–8.2)
RBC # BLD AUTO: 4.05 MILLION/UL (ref 3.81–5.12)
SODIUM SERPL-SCNC: 141 MMOL/L (ref 136–145)
T4 FREE SERPL-MCNC: 0.84 NG/DL (ref 0.76–1.46)
TRIGL SERPL-MCNC: 72 MG/DL
TSH SERPL DL<=0.05 MIU/L-ACNC: 2.7 UIU/ML (ref 0.36–3.74)
WBC # BLD AUTO: 5.17 THOUSAND/UL (ref 4.31–10.16)

## 2020-08-03 PROCEDURE — 36415 COLL VENOUS BLD VENIPUNCTURE: CPT

## 2020-08-03 PROCEDURE — 84439 ASSAY OF FREE THYROXINE: CPT

## 2020-08-03 PROCEDURE — 84443 ASSAY THYROID STIM HORMONE: CPT

## 2020-08-03 PROCEDURE — 80053 COMPREHEN METABOLIC PANEL: CPT

## 2020-08-03 PROCEDURE — 83036 HEMOGLOBIN GLYCOSYLATED A1C: CPT

## 2020-08-03 PROCEDURE — 80061 LIPID PANEL: CPT

## 2020-08-03 PROCEDURE — 85027 COMPLETE CBC AUTOMATED: CPT

## 2020-08-03 PROCEDURE — 82306 VITAMIN D 25 HYDROXY: CPT

## 2021-01-21 ENCOUNTER — HOSPITAL ENCOUNTER (OUTPATIENT)
Facility: HOSPITAL | Age: 86
Setting detail: OBSERVATION
Discharge: HOME | End: 2021-01-22
Attending: EMERGENCY MEDICINE | Admitting: INTERNAL MEDICINE
Payer: MEDICARE

## 2021-01-21 ENCOUNTER — APPOINTMENT (EMERGENCY)
Dept: CT IMAGING | Facility: HOSPITAL | Age: 86
End: 2021-01-21
Payer: MEDICARE

## 2021-01-21 ENCOUNTER — APPOINTMENT (EMERGENCY)
Dept: RADIOLOGY | Facility: HOSPITAL | Age: 86
End: 2021-01-21
Payer: MEDICARE

## 2021-01-21 DIAGNOSIS — I10 HYPERTENSION, UNSPECIFIED TYPE: ICD-10-CM

## 2021-01-21 DIAGNOSIS — R53.1 WEAKNESS: Primary | ICD-10-CM

## 2021-01-21 DIAGNOSIS — R42 DIZZINESS: ICD-10-CM

## 2021-01-21 DIAGNOSIS — I10 HYPERTENSION: ICD-10-CM

## 2021-01-21 LAB
ALBUMIN SERPL BCP-MCNC: 4.1 G/DL (ref 3.5–5.7)
ALP SERPL-CCNC: 40 U/L (ref 55–165)
ALT SERPL W P-5'-P-CCNC: 13 U/L (ref 7–52)
ANION GAP SERPL CALCULATED.3IONS-SCNC: 4 MMOL/L (ref 4–13)
APTT PPP: 30 SECONDS (ref 23–37)
AST SERPL W P-5'-P-CCNC: 15 U/L (ref 13–39)
BASOPHILS # BLD AUTO: 0 THOUSANDS/ΜL (ref 0–0.1)
BASOPHILS NFR BLD AUTO: 1 % (ref 0–2)
BILIRUB SERPL-MCNC: 0.4 MG/DL (ref 0.2–1)
BILIRUB UR QL STRIP: NEGATIVE
BUN SERPL-MCNC: 12 MG/DL (ref 7–25)
CALCIUM SERPL-MCNC: 8.9 MG/DL (ref 8.6–10.5)
CHLORIDE SERPL-SCNC: 102 MMOL/L (ref 98–107)
CLARITY UR: CLEAR
CO2 SERPL-SCNC: 30 MMOL/L (ref 21–31)
COLOR UR: YELLOW
CREAT SERPL-MCNC: 0.64 MG/DL (ref 0.6–1.2)
EOSINOPHIL # BLD AUTO: 0 THOUSAND/ΜL (ref 0–0.61)
EOSINOPHIL NFR BLD AUTO: 1 % (ref 0–5)
ERYTHROCYTE [DISTWIDTH] IN BLOOD BY AUTOMATED COUNT: 14.7 % (ref 11.5–14.5)
GFR SERPL CREATININE-BSD FRML MDRD: 80 ML/MIN/1.73SQ M
GLUCOSE SERPL-MCNC: 108 MG/DL (ref 65–99)
GLUCOSE UR STRIP-MCNC: NEGATIVE MG/DL
HCT VFR BLD AUTO: 36.6 % (ref 42–47)
HGB BLD-MCNC: 12.1 G/DL (ref 12–16)
HGB UR QL STRIP.AUTO: NEGATIVE
INR PPP: 1.03 (ref 0.84–1.19)
KETONES UR STRIP-MCNC: NEGATIVE MG/DL
LACTATE SERPL-SCNC: 0.7 MMOL/L (ref 0.5–2)
LEUKOCYTE ESTERASE UR QL STRIP: NEGATIVE
LYMPHOCYTES # BLD AUTO: 1 THOUSANDS/ΜL (ref 0.6–4.47)
LYMPHOCYTES NFR BLD AUTO: 22 % (ref 21–51)
MAGNESIUM SERPL-MCNC: 2.3 MG/DL (ref 1.9–2.7)
MCH RBC QN AUTO: 32 PG (ref 26–34)
MCHC RBC AUTO-ENTMCNC: 33.1 G/DL (ref 31–37)
MCV RBC AUTO: 97 FL (ref 81–99)
MONOCYTES # BLD AUTO: 0.6 THOUSAND/ΜL (ref 0.17–1.22)
MONOCYTES NFR BLD AUTO: 14 % (ref 2–12)
NEUTROPHILS # BLD AUTO: 2.7 THOUSANDS/ΜL (ref 1.4–6.5)
NEUTS SEG NFR BLD AUTO: 63 % (ref 42–75)
NITRITE UR QL STRIP: NEGATIVE
PH UR STRIP.AUTO: 7 [PH]
PLATELET # BLD AUTO: 197 THOUSANDS/UL (ref 149–390)
PMV BLD AUTO: 7.8 FL (ref 8.6–11.7)
POTASSIUM SERPL-SCNC: 3.5 MMOL/L (ref 3.5–5.5)
PROT SERPL-MCNC: 6.4 G/DL (ref 6.4–8.9)
PROT UR STRIP-MCNC: NEGATIVE MG/DL
PROTHROMBIN TIME: 13.4 SECONDS (ref 11.6–14.5)
RBC # BLD AUTO: 3.79 MILLION/UL (ref 3.9–5.2)
SODIUM SERPL-SCNC: 136 MMOL/L (ref 134–143)
SP GR UR STRIP.AUTO: 1.01 (ref 1–1.03)
TROPONIN I SERPL-MCNC: <0.03 NG/ML
TROPONIN I SERPL-MCNC: <0.03 NG/ML
TSH SERPL DL<=0.05 MIU/L-ACNC: 4.62 UIU/ML (ref 0.45–5.33)
UROBILINOGEN UR QL STRIP.AUTO: 0.2 E.U./DL
WBC # BLD AUTO: 4.4 THOUSAND/UL (ref 4.8–10.8)

## 2021-01-21 PROCEDURE — 99291 CRITICAL CARE FIRST HOUR: CPT | Performed by: EMERGENCY MEDICINE

## 2021-01-21 PROCEDURE — 1123F ACP DISCUSS/DSCN MKR DOCD: CPT | Performed by: EMERGENCY MEDICINE

## 2021-01-21 PROCEDURE — 84484 ASSAY OF TROPONIN QUANT: CPT | Performed by: EMERGENCY MEDICINE

## 2021-01-21 PROCEDURE — 81003 URINALYSIS AUTO W/O SCOPE: CPT | Performed by: EMERGENCY MEDICINE

## 2021-01-21 PROCEDURE — 84443 ASSAY THYROID STIM HORMONE: CPT | Performed by: EMERGENCY MEDICINE

## 2021-01-21 PROCEDURE — 99220 PR INITIAL OBSERVATION CARE/DAY 70 MINUTES: CPT | Performed by: PHYSICIAN ASSISTANT

## 2021-01-21 PROCEDURE — 99285 EMERGENCY DEPT VISIT HI MDM: CPT

## 2021-01-21 PROCEDURE — 85025 COMPLETE CBC W/AUTO DIFF WBC: CPT | Performed by: EMERGENCY MEDICINE

## 2021-01-21 PROCEDURE — G1004 CDSM NDSC: HCPCS

## 2021-01-21 PROCEDURE — 83605 ASSAY OF LACTIC ACID: CPT | Performed by: EMERGENCY MEDICINE

## 2021-01-21 PROCEDURE — 83735 ASSAY OF MAGNESIUM: CPT | Performed by: EMERGENCY MEDICINE

## 2021-01-21 PROCEDURE — 71045 X-RAY EXAM CHEST 1 VIEW: CPT

## 2021-01-21 PROCEDURE — 70450 CT HEAD/BRAIN W/O DYE: CPT

## 2021-01-21 PROCEDURE — 80053 COMPREHEN METABOLIC PANEL: CPT | Performed by: EMERGENCY MEDICINE

## 2021-01-21 PROCEDURE — 85610 PROTHROMBIN TIME: CPT | Performed by: EMERGENCY MEDICINE

## 2021-01-21 PROCEDURE — 85730 THROMBOPLASTIN TIME PARTIAL: CPT | Performed by: EMERGENCY MEDICINE

## 2021-01-21 PROCEDURE — 84484 ASSAY OF TROPONIN QUANT: CPT | Performed by: PHYSICIAN ASSISTANT

## 2021-01-21 PROCEDURE — 93005 ELECTROCARDIOGRAM TRACING: CPT

## 2021-01-21 PROCEDURE — 36415 COLL VENOUS BLD VENIPUNCTURE: CPT | Performed by: EMERGENCY MEDICINE

## 2021-01-21 RX ORDER — CLONIDINE HYDROCHLORIDE 0.1 MG/1
0.1 TABLET ORAL ONCE
Status: COMPLETED | OUTPATIENT
Start: 2021-01-21 | End: 2021-01-21

## 2021-01-21 RX ORDER — NITROGLYCERIN 0.4 MG/1
0.4 TABLET SUBLINGUAL
Status: DISCONTINUED | OUTPATIENT
Start: 2021-01-21 | End: 2021-01-22 | Stop reason: HOSPADM

## 2021-01-21 RX ORDER — ACETAMINOPHEN 325 MG/1
650 TABLET ORAL EVERY 4 HOURS PRN
Status: DISCONTINUED | OUTPATIENT
Start: 2021-01-21 | End: 2021-01-22 | Stop reason: HOSPADM

## 2021-01-21 RX ORDER — LANOLIN ALCOHOL/MO/W.PET/CERES
6 CREAM (GRAM) TOPICAL
Status: DISCONTINUED | OUTPATIENT
Start: 2021-01-21 | End: 2021-01-22 | Stop reason: HOSPADM

## 2021-01-21 RX ORDER — PANTOPRAZOLE SODIUM 20 MG/1
20 TABLET, DELAYED RELEASE ORAL
Status: DISCONTINUED | OUTPATIENT
Start: 2021-01-22 | End: 2021-01-22 | Stop reason: HOSPADM

## 2021-01-21 RX ORDER — POTASSIUM CHLORIDE 20 MEQ/1
20 TABLET, EXTENDED RELEASE ORAL ONCE
Status: COMPLETED | OUTPATIENT
Start: 2021-01-21 | End: 2021-01-21

## 2021-01-21 RX ORDER — AMLODIPINE BESYLATE 5 MG/1
5 TABLET ORAL DAILY
Status: DISCONTINUED | OUTPATIENT
Start: 2021-01-22 | End: 2021-01-22 | Stop reason: HOSPADM

## 2021-01-21 RX ORDER — ONDANSETRON 2 MG/ML
4 INJECTION INTRAMUSCULAR; INTRAVENOUS EVERY 6 HOURS PRN
Status: DISCONTINUED | OUTPATIENT
Start: 2021-01-21 | End: 2021-01-22 | Stop reason: HOSPADM

## 2021-01-21 RX ORDER — ALPRAZOLAM 0.25 MG/1
0.25 TABLET ORAL 2 TIMES DAILY PRN
Status: DISCONTINUED | OUTPATIENT
Start: 2021-01-21 | End: 2021-01-22 | Stop reason: HOSPADM

## 2021-01-21 RX ADMIN — CLONIDINE HYDROCHLORIDE 0.1 MG: 0.1 TABLET ORAL at 19:06

## 2021-01-21 RX ADMIN — MELATONIN 6 MG: at 23:23

## 2021-01-21 RX ADMIN — ALPRAZOLAM 0.25 MG: 0.25 TABLET ORAL at 23:23

## 2021-01-21 RX ADMIN — POTASSIUM CHLORIDE 20 MEQ: 1500 TABLET, EXTENDED RELEASE ORAL at 23:23

## 2021-01-21 NOTE — ED PROVIDER NOTES
History  Chief Complaint   Patient presents with    Shaking     Onset of shakes and dizziness approx 30 minutes PTA, Denies CP, SOB, HA  Just feels "wierd"     HPI    This is a very pleasant, , generally healthy, only relevant medical history is GERD on Prilosec, presents to the emergency department with a chief complaint of dizziness, shakiness, and a general feeling of not being well  Patient is accompanied to room 11 with her family member which is a niece  The symptoms started approximately 4:00 p m  Charlotte Gotti Patient awoke today feeling normal   Patient was able to ambulate up to 13 steps without difficulty  Prior surgical history is cataract extraction bilaterally, uneventful cholecystectomy 2015 and a history of an EGD in June 2019  Niece of the patient reports the patient was at her GI specialist last week and her blood pressure was 916 systolic  Prior to Admission Medications   Prescriptions Last Dose Informant Patient Reported? Taking? Multiple Vitamins-Minerals (PRESERVISION AREDS PO)   Yes No   Sig: Take 1 caplet by mouth 2 (two) times a day   ibuprofen (MOTRIN) 400 mg tablet   No No   Sig: Take 1 tablet (400 mg total) by mouth every 6 (six) hours as needed for mild pain or moderate pain   methocarbamol (ROBAXIN) 500 mg tablet   No No   Sig: Take 1 tablet (500 mg total) by mouth 3 (three) times a day as needed for muscle spasms for up to 20 doses   omeprazole (PriLOSEC) 20 mg delayed release capsule   Yes No   Sig: Take 20 mg by mouth every morning      Facility-Administered Medications: None       Past Medical History:   Diagnosis Date    Dysphagia     GERD (gastroesophageal reflux disease)        Past Surgical History:   Procedure Laterality Date    [facility]    CHOLECYSTECTOMY  2015    EGD      x2 and was dilated       Family History   Family history unknown: Yes     I have reviewed and agree with the history as documented      E-Cigarette/Vaping    E-Cigarette Use Never User      E-Cigarette/Vaping Substances     Social History     Tobacco Use    Smoking status: Never Smoker    Smokeless tobacco: Never Used   Substance Use Topics    Alcohol use: Not Currently    Drug use: Never       Review of Systems   Constitutional: Negative  HENT: Negative  Eyes: Negative  Respiratory: Negative  Cardiovascular: Negative  Gastrointestinal: Negative  Endocrine: Negative  Genitourinary: Negative  Musculoskeletal: Negative  Allergic/Immunologic: Negative  Neurological: Positive for dizziness  Hematological: Negative  Psychiatric/Behavioral: Negative  Physical Exam  Physical Exam  Vitals signs and nursing note reviewed  Constitutional:       Appearance: Normal appearance  She is normal weight  HENT:      Head: Normocephalic and atraumatic  Comments: Patient maintaining airway maintaining secretions  No stridor  No brawniness under the tongue  Uvula midline without edema  Right Ear: External ear normal       Left Ear: External ear normal       Nose: Nose normal       Mouth/Throat:      Mouth: Mucous membranes are moist       Pharynx: Oropharynx is clear  Eyes:      Extraocular Movements: Extraocular movements intact  Conjunctiva/sclera: Conjunctivae normal       Pupils: Pupils are equal, round, and reactive to light  Neck:      Musculoskeletal: Normal range of motion and neck supple  Cardiovascular:      Rate and Rhythm: Normal rate  Rhythm irregular  Pulses: Normal pulses  Heart sounds: Normal heart sounds  Pulmonary:      Effort: Pulmonary effort is normal       Breath sounds: Normal breath sounds  Abdominal:      General: Abdomen is flat  Bowel sounds are normal       Palpations: Abdomen is soft  Musculoskeletal: Normal range of motion  Skin:     General: Skin is warm  Capillary Refill: Capillary refill takes less than 2 seconds     Neurological:      General: No focal deficit present  Mental Status: She is alert and oriented to person, place, and time  Mental status is at baseline  Psychiatric:         Mood and Affect: Mood normal          Behavior: Behavior normal          Vital Signs  ED Triage Vitals [01/21/21 1711]   Temperature Pulse Respirations Blood Pressure SpO2   97 8 °F (36 6 °C) 89 14 (!) 180/110 99 %      Temp Source Heart Rate Source Patient Position - Orthostatic VS BP Location FiO2 (%)   Tympanic -- -- -- --      Pain Score       --           Vitals:    01/21/21 1711 01/21/21 1842 01/21/21 2058   BP: (!) 180/110 (!) 220/99 149/69   Pulse: 89           Visual Acuity      ED Medications  Medications   cloNIDine (CATAPRES) tablet 0 1 mg (0 1 mg Oral Given 1/21/21 1906)       Diagnostic Studies  Results Reviewed     Procedure Component Value Units Date/Time    Magnesium [[id number]]     Lab Status: No result Specimen: Blood     UA w Reflex to Microscopic w Reflex to Culture [[id number]]  (Normal) Collected: 01/21/21 2002    Lab Status: Final result Specimen: Urine, Clean Catch Updated: 01/21/21 2010     Color, UA Yellow     Clarity, UA Clear     Specific Gravity, UA 1 010     pH, UA 7 0     Leukocytes, UA Negative     Nitrite, UA Negative     Protein, UA Negative mg/dl      Glucose, UA Negative mg/dl      Ketones, UA Negative mg/dl      Urobilinogen, UA 0 2 E U /dl      Bilirubin, UA Negative     Blood, UA Negative    TSH [[id number]]  (Normal) Collected: 01/21/21 1804    Lab Status: Final result Specimen: Blood from Arm, Right Updated: 01/21/21 1906     TSH 3RD GENERATON 4 620 uIU/mL     Narrative:      Patients undergoing fluorescein dye angiography may retain small amounts of fluorescein in the body for 48-72 hours post procedure  Samples containing fluorescein can produce falsely depressed TSH values  If the patient had this procedure,a specimen should be resubmitted post fluorescein clearance        Troponin I [[id number]]  (Normal) Collected: 01/21/21 1804    Lab Status: Final result Specimen: Blood from Arm, Right Updated: 01/21/21 1845     Troponin I <0 03 ng/mL     Comprehensive metabolic panel [[id number]]  (Abnormal) Collected: 01/21/21 1804    Lab Status: Final result Specimen: Blood from Arm, Right Updated: 01/21/21 1840     Sodium 136 mmol/L      Potassium 3 5 mmol/L      Chloride 102 mmol/L      CO2 30 mmol/L      ANION GAP 4 mmol/L      BUN 12 mg/dL      Creatinine 0 64 mg/dL      Glucose 108 mg/dL      Calcium 8 9 mg/dL      AST 15 U/L      ALT 13 U/L      Alkaline Phosphatase 40 U/L      Total Protein 6 4 g/dL      Albumin 4 1 g/dL      Total Bilirubin 0 40 mg/dL      eGFR 80 ml/min/1 73sq m     Narrative:      [facility] guidelines for Chronic Kidney Disease (CKD):     Stage 1 with normal or high GFR (GFR > 90 mL/min/1 73 square meters)    Stage 2 Mild CKD (GFR = 60-89 mL/min/1 73 square meters)    Stage 3A Moderate CKD (GFR = 45-59 mL/min/1 73 square meters)    Stage 3B Moderate CKD (GFR = 30-44 mL/min/1 73 square meters)    Stage 4 Severe CKD (GFR = 15-29 mL/min/1 73 square meters)    Stage 5 End Stage CKD (GFR <15 mL/min/1 73 square meters)  Note: GFR calculation is accurate only with a steady state creatinine    Lactic acid [[id number]]  (Normal) Collected: 01/21/21 1804    Lab Status: Final result Specimen: Blood from Arm, Right Updated: 01/21/21 1837     LACTIC ACID 0 7 mmol/L     Narrative:      Result may be elevated if tourniquet was used during collection      Rin Roblero [[id number]]  (Normal) Collected: 01/21/21 1804    Lab Status: Final result Specimen: Blood from Arm, Right Updated: 01/21/21 1836     Protime 13 4 seconds      INR 1 03    APTT [[id number]]  (Normal) Collected: 01/21/21 1804    Lab Status: Final result Specimen: Blood from Arm, Right Updated: 01/21/21 1836     PTT 30 seconds     CBC and differential [[id number]]  (Abnormal) Collected: 01/21/21 1804    Lab Status: Final result Specimen: Blood from Arm, Right Updated: 01/21/21 1813     WBC 4 40 Thousand/uL      RBC 3 79 Million/uL      Hemoglobin 12 1 g/dL      Hematocrit 36 6 %      MCV 97 fL      MCH 32 0 pg      MCHC 33 1 g/dL      RDW 14 7 %      MPV 7 8 fL      Platelets 270 Thousands/uL      Neutrophils Relative 63 %      Lymphocytes Relative 22 %      Monocytes Relative 14 %      Eosinophils Relative 1 %      Basophils Relative 1 %      Neutrophils Absolute 2 70 Thousands/µL      Lymphocytes Absolute 1 00 Thousands/µL      Monocytes Absolute 0 60 Thousand/µL      Eosinophils Absolute 0 00 Thousand/µL      Basophils Absolute 0 00 Thousands/µL                  CT head without contrast   Final Result by Teresa Butler DO (01/21 1838)   No acute intracranial abnormality  Workstation performed: [id number]         XR chest 1 view portable    (Results Pending)              Procedures  ECG 12 Lead Documentation Only    Date/Time: 1/21/2021 5:55 PM  Performed by: Regina Lerma III, DO  Authorized by: Regina Lerma III, DO     Indications / Diagnosis:  Elevated BP, weakness, shaky  Comments:      I personally reviewed this EKG is performed the patient January 21, 2021  EKG was completed at 5:53 p m  And interpreted by me at 5:55 p m   Normal sinus rhythm with an occasional PVC noted  There is a baseline artifact noted  No acute ST abnormalities  CriticalCare Time  Performed by: Munir Parker DO  Authorized by: Regina Lerma III, DO     Critical care provider statement:     Critical care time (minutes):  35    Critical care start time:  1/21/2021 6:51 PM    Critical care end time:  1/21/2021 8:51 PM  Comments:      Patient's blood pressure improved to 520 systolic down from a 515 systolic, after 1 dose of clonidine  Patient's symptoms had subsided no dizziness no shakiness  ED Course  ED Course as of Jan 21 2135   Thu Jan 21, 2021   1720 History and physical completed  Orders placed    Initial blood pressure on the monitor was 394 systolic  Patient's blood pressure that is in the computer was done manually by the RN on the left side  Diff dx: HTN urgency vs  UTI vs  Electrolyte abnormality vs  Atypical cardiac    Plan: Labs, EKG, monitor vitals and CT of head  1814 Patient back from CT imaging  1843 Noted BP still elevated: 635 systolic, CT of head negative, reviewed with family this finding, will give clonodine prn and re-assess  1928 Patient BP down to 182 systolic after 0 1 mg of clonodine  Awaiting urine results to determine disposition  2050 Case d/w Vanessa Arriola PA-C will accept pt for admission  Bed assignment put in, upon assessment by the knee is noted that the patient had runs of PVCs on the monitor, blood pressure now is 774 systolic  Will proceed with adding a magnesium level to the patient's labs  MDM  Number of Diagnoses or Management Options  Dizziness:   Hypertension:   Weakness:   Diagnosis management comments: New onset axillary hypertension,  NOT on any BP medication, recently BP within in nrl limits (self reported by family members),  patient has a questionable history dementia with increasing confusion over the last few years worse today went her blood pressure was 710 systolic  Patient's baseline labs unremarkable  Patient was given 1 dose of clonidine and came down to 518 systolic and eventually 588/75  Will admit for observation  Portions of the record may have been created with voice recognition software  Occasional wrong word or "sound a like" substitutions may have occurred due to the inherent limitations of voice recognition software  Read the chart carefully and recognize, using context, where substitutions have occurred             Amount and/or Complexity of Data Reviewed  Clinical lab tests: ordered and reviewed  Tests in the radiology section of CPT®: ordered and reviewed  Tests in the medicine section of CPT®: ordered and reviewed        Disposition  Final diagnoses:   Weakness   Hypertension   Dizziness     Time reflects when diagnosis was documented in both MDM as applicable and the Disposition within this note     Time User Action Codes Description Comment    1/21/2021  9:11 PM Isabel Cervantes [R53 1] Weakness     1/21/2021  9:11 PM Afua Collinsashley Add [I10] Hypertension     1/21/2021  9:11 PM Afua Jurashley Add [R42] Dizziness       ED Disposition     ED Disposition Condition Date/Time Comment    Admit Stable Thu Jan 21, 2021  8:55 PM Case was discussed with Hannae Radford PA-C and the patient's admission status was agreed to be Admission Status: observation status to the service of Dr Mala Singh           Follow-up Information    None         Patient's Medications   Discharge Prescriptions    No medications on file     No discharge procedures on file      PDMP Review     None          ED Provider  Electronically Signed by           Frankie Samuels III, DO  01/21/21 7559

## 2021-01-22 VITALS
WEIGHT: 106.04 LBS | DIASTOLIC BLOOD PRESSURE: 58 MMHG | HEIGHT: 65 IN | BODY MASS INDEX: 17.67 KG/M2 | RESPIRATION RATE: 20 BRPM | OXYGEN SATURATION: 99 % | SYSTOLIC BLOOD PRESSURE: 123 MMHG | HEART RATE: 81 BPM | TEMPERATURE: 95.9 F

## 2021-01-22 PROBLEM — F03.90 DEMENTIA (HCC): Status: ACTIVE | Noted: 2021-01-22

## 2021-01-22 LAB
ANION GAP SERPL CALCULATED.3IONS-SCNC: 6 MMOL/L (ref 4–13)
ATRIAL RATE: 71 BPM
ATRIAL RATE: 74 BPM
ATRIAL RATE: 80 BPM
BUN SERPL-MCNC: 10 MG/DL (ref 7–25)
CALCIUM SERPL-MCNC: 8.8 MG/DL (ref 8.6–10.5)
CHLORIDE SERPL-SCNC: 104 MMOL/L (ref 98–107)
CHOLEST SERPL-MCNC: 126 MG/DL (ref 0–200)
CO2 SERPL-SCNC: 28 MMOL/L (ref 21–31)
CREAT SERPL-MCNC: 0.55 MG/DL (ref 0.6–1.2)
GFR SERPL CREATININE-BSD FRML MDRD: 85 ML/MIN/1.73SQ M
GLUCOSE P FAST SERPL-MCNC: 88 MG/DL (ref 65–99)
GLUCOSE SERPL-MCNC: 88 MG/DL (ref 65–99)
HDLC SERPL-MCNC: 49 MG/DL
LDLC SERPL CALC-MCNC: 68 MG/DL (ref 0–100)
NONHDLC SERPL-MCNC: 77 MG/DL
P AXIS: -7 DEGREES
P AXIS: 61 DEGREES
P AXIS: 67 DEGREES
POTASSIUM SERPL-SCNC: 3.6 MMOL/L (ref 3.5–5.5)
PR INTERVAL: 146 MS
PR INTERVAL: 148 MS
PR INTERVAL: 150 MS
QRS AXIS: -38 DEGREES
QRS AXIS: -39 DEGREES
QRS AXIS: 92 DEGREES
QRSD INTERVAL: 96 MS
QRSD INTERVAL: 98 MS
QRSD INTERVAL: 98 MS
QT INTERVAL: 402 MS
QT INTERVAL: 424 MS
QT INTERVAL: 428 MS
QTC INTERVAL: 460 MS
QTC INTERVAL: 463 MS
QTC INTERVAL: 475 MS
SODIUM SERPL-SCNC: 138 MMOL/L (ref 134–143)
T WAVE AXIS: 0 DEGREES
T WAVE AXIS: 37 DEGREES
T WAVE AXIS: 41 DEGREES
TRIGL SERPL-MCNC: 45 MG/DL (ref 44–166)
TROPONIN I SERPL-MCNC: <0.03 NG/ML
VENTRICULAR RATE: 71 BPM
VENTRICULAR RATE: 74 BPM
VENTRICULAR RATE: 80 BPM

## 2021-01-22 PROCEDURE — 97163 PT EVAL HIGH COMPLEX 45 MIN: CPT

## 2021-01-22 PROCEDURE — 80048 BASIC METABOLIC PNL TOTAL CA: CPT | Performed by: PHYSICIAN ASSISTANT

## 2021-01-22 PROCEDURE — 80061 LIPID PANEL: CPT | Performed by: PHYSICIAN ASSISTANT

## 2021-01-22 PROCEDURE — 99217 PR OBSERVATION CARE DISCHARGE MANAGEMENT: CPT | Performed by: STUDENT IN AN ORGANIZED HEALTH CARE EDUCATION/TRAINING PROGRAM

## 2021-01-22 PROCEDURE — 84484 ASSAY OF TROPONIN QUANT: CPT | Performed by: INTERNAL MEDICINE

## 2021-01-22 PROCEDURE — 93005 ELECTROCARDIOGRAM TRACING: CPT

## 2021-01-22 PROCEDURE — 93010 ELECTROCARDIOGRAM REPORT: CPT | Performed by: INTERNAL MEDICINE

## 2021-01-22 RX ORDER — AMLODIPINE BESYLATE 5 MG/1
5 TABLET ORAL DAILY
Qty: 30 TABLET | Refills: 0 | Status: SHIPPED | OUTPATIENT
Start: 2021-01-23 | End: 2021-02-22

## 2021-01-22 RX ADMIN — AMLODIPINE BESYLATE 5 MG: 5 TABLET ORAL at 10:02

## 2021-01-22 RX ADMIN — ENOXAPARIN SODIUM 40 MG: 40 INJECTION SUBCUTANEOUS at 10:02

## 2021-01-22 RX ADMIN — PANTOPRAZOLE SODIUM 20 MG: 20 TABLET, DELAYED RELEASE ORAL at 05:43

## 2021-01-22 NOTE — OCCUPATIONAL THERAPY NOTE
Occupational Therapy Screen    Spoke with PT who reports Pt is currently I with ADLs  D/c OT dustin at this time, will reassess as needed      Charanjit Garcia MS, OTR/L

## 2021-01-22 NOTE — DISCHARGE INSTRUCTIONS
Hypertensive Crisis   WHAT YOU NEED TO KNOW:   A hypertensive crisis is a sudden spike in blood pressure to 180/120 or higher  A normal blood pressure is 119/79 or lower  A hypertensive crisis is also known as acute hypertension  This is a medical emergency that could lead to organ damage or be life-threatening  DISCHARGE INSTRUCTIONS:   Call 911 for any of the following:   · You have chest    · You have back pain or shortness of breath  · You have weakness or numbness in your face, arms, or legs  · You cannot see or talk as well as usual     Return to the emergency department if:   · You have a severe headache  Contact your healthcare provider if:   · Your blood pressure is 180/110 or higher but you have no other symptoms  · You have questions or concerns about your condition or care  Medicines: You may need any of the following:  · Blood pressure medicine  is given to lower your blood pressure  There are many different types of blood pressure medicine, and you may need more than one type  It is very important to take your blood pressure medicine exactly as directed  Skipped doses can lead to a hypertensive crisis  Talk to your healthcare provider if you are having side effects from your medicine  Do not  stop taking it without talking to your healthcare provider  · Diuretics  help decrease extra fluid that collects in your blood vessels  This lowers your blood pressure by reducing pressure in your arteries  Diuretics are often called water pills  You may urinate more often while you take this medicine  · Take your medicine as directed  Contact your healthcare provider if you think your medicine is not helping or if you have side effects  Tell him of her if you are allergic to any medicine  Keep a list of the medicines, vitamins, and herbs you take  Include the amounts, and when and why you take them  Bring the list or the pill bottles to follow-up visits   Carry your medicine list with you in case of an emergency  Follow up with your healthcare provider within 1 to 5 days or as directed: You will need to return to have your blood pressure checked and other tests  Your healthcare provider may also refer to you a cardiologist  Write down your questions so you remember to ask them during your visits  Prevent another hypertensive crisis:   · Check your blood pressure at home  Sit and rest for 5 minutes before you take your blood pressure  Extend your arm and support it on a flat surface  Your arm should be at the same level as your heart  Follow the directions that came with your blood pressure monitor  If possible, take at least 2 blood pressure readings each time  Take your blood pressure at least twice a day at the same times each day, such as morning and evening  Keep a record of your readings and bring it to your follow-up visits  Ask your healthcare provider what your blood pressure should be  · Manage any other health conditions you have  Health conditions such as diabetes can increase your risk for hypertension  Follow your healthcare provider's instructions and take all your medicines as directed  · Ask about all medicines  Certain medicines can increase your blood pressure  Examples include oral birth control pills, decongestants, herbal supplements, and NSAIDs, such as ibuprofen  Your healthcare provider can tell you which medicines are safe for you to take  This includes prescription and over-the-counter medicines  · Limit sodium (salt) as directed  Too much sodium can affect your fluid balance  Check labels to find low-sodium or no-salt-added foods  Some low-sodium foods use potassium salts for flavor  Too much potassium can also cause health problems  Your healthcare provider will tell you how much sodium and potassium are safe for you to have in a day  He or she may recommend that you limit sodium to 2,300 mg a day           · Follow the meal plan recommended by your healthcare provider  A dietitian or your provider can give you more information on low-sodium plans or the DASH (Dietary Approaches to Stop Hypertension) eating plan  The DASH plan is low in sodium, unhealthy fats, and total fat  It is high in potassium, calcium, and fiber  · Exercise to maintain a healthy weight  Exercise at least 30 minutes per day, on most days of the week  This will help decrease your blood pressure  Ask your healthcare provider about the best exercise plan for you  · Decrease stress  This may help lower your blood pressure  Learn ways to relax, such as deep breathing or listening to music  · Limit alcohol as directed  Alcohol can increase your blood pressure  A drink of alcohol is 12 ounces of beer, 5 ounces of wine, or 1½ ounces of liquor  · Do not smoke  Nicotine and other chemicals in cigarettes and cigars can increase your blood pressure and also cause lung damage  Ask your healthcare provider for information if you currently smoke and need help to quit  E-cigarettes or smokeless tobacco still contain nicotine  Talk to your healthcare provider before you use these products  © Copyright [facility] [address] Information is for End User's use only and may not be sold, redistributed or otherwise used for commercial purposes  All illustrations and images included in CareNotes® are the copyrighted property of A D A M , Inc  or [address] [place] [address]  The above information is an  only  It is not intended as medical advice for individual conditions or treatments  Talk to your doctor, nurse or pharmacist before following any medical regimen to see if it is safe and effective for you  Hypertension in the Older Adult   WHAT YOU NEED TO KNOW:   Hypertension is high blood pressure  Your blood pressure is the force of your blood moving against the walls of your arteries   Hypertension causes your blood pressure to get so high that your heart has to work much harder than normal  This can damage your heart  The cause of your hypertension may not be known  This is called essential or primary hypertension  Hypertension caused by another medical condition, such as kidney disease, is called secondary hypertension  Blood pressure often increases with age  The diastolic (bottom) number tends to decrease, but the systolic (top) number tends to increase  However, hypertension is not a normal part of aging  You may be able to control your blood pressure with lifestyle changes, medicines, or both  DISCHARGE INSTRUCTIONS:   Call 911 or have someone else call for any of the following:   · You have chest pain  · You have any of the following signs of a heart attack:      ? Squeezing, pressure, or pain in your chest    ? You may  also have any of the following:     § Discomfort or pain in your back, neck, jaw, stomach, or arm    § Shortness of breath    § Nausea or vomiting    § Lightheadedness or a sudden cold sweat    · You become confused or have difficulty speaking  · You suddenly feel lightheaded or have trouble breathing  Return to the emergency department if:   · You have a severe headache or vision loss  · You have weakness in an arm or leg  · You get dizzy and fall  Contact your healthcare provider if:   · You feel faint, dizzy, confused, or drowsy  · You have been taking your blood pressure medicine but your pressure is higher than your provider says it should be  · Your blood pressure is lower than your healthcare provider said it should be  · You have questions or concerns about your condition or care  Medicines: You may  need any of the following:  · Medicine  may be used to help lower your blood pressure  You may need more than one type of medicine  · Diuretics  help decrease extra fluid that collects in your body  This will help lower your blood pressure   You may urinate more often while you take this medicine  · Take your medicine as directed  Contact your healthcare provider if you think your medicine is not helping or if you have side effects  Tell him or her if you are allergic to any medicine  Keep a list of the medicines, vitamins, and herbs you take  Include the amounts, and when and why you take them  Bring the list or the pill bottles to follow-up visits  Carry your medicine list with you in case of an emergency  What you need to know about the stages of hypertension:       · Normal blood pressure is 119/79 or lower   Your healthcare provider may only check your blood pressure each year if it stays at a normal level  · Elevated blood pressure is 120/79 to 129/79   This is sometimes called prehypertension  Your healthcare provider may suggest lifestyle changes to help lower your blood pressure to a normal level  He or she may then check it again in 3 to 6 months  · Stage 1 hypertension is 130/80  to 139/89   Your provider may recommend lifestyle changes, medication, and checks every 3 to 6 months until your blood pressure is controlled  · Stage 2 hypertension is 140/90 or higher   Treatment may include lifestyle changes and medicines to lower your blood pressure  You will also need to have your blood pressure checked monthly until it is controlled  Follow up with your healthcare provider as directed: You will need to return to have your blood pressure checked  Write down your questions so you remember to ask them during your visits  Manage hypertension:   · Check your blood pressure at home  Avoid smoking, caffeine, and exercise at least 30 minutes before checking your blood pressure  Sit and rest for 5 minutes before you take your blood pressure  Extend your arm and support it on a flat surface  Your arm should be at the same level as your heart  Follow the directions that came with your blood pressure monitor   Check your blood pressure 2 times, 1 minute apart, before you take your medicine in the morning  Also check your blood pressure before your evening meal  Keep a record of your readings and bring it to your follow-up visits  Ask your healthcare provider what your blood pressure should be  · Manage any other health conditions you have  Health conditions such as diabetes can increase your risk for hypertension  Follow your healthcare provider's instructions and take all your medicines as directed  · Ask about all medicines  Certain medicines can increase your blood pressure  Examples include decongestants, herbal supplements, and NSAIDs, such as ibuprofen  Your healthcare provider can tell you which medicines are safe for you to take  This includes prescription and over-the-counter medicines  You may also be taking several prescription medicines for other health conditions  It is important for your healthcare provider to know all your current medicines  Bring a list of your medicines or the pill bottles with you to follow-up visits  This will help your healthcare provider manage your current prescriptions and order new prescriptions  Lifestyle changes you can make to manage hypertension:   · Limit sodium (salt) as directed  Too much sodium can affect your fluid balance  Check labels to find low-sodium or no-salt-added foods  Some low-sodium foods use potassium salts for flavor  Too much potassium can also cause health problems  Your healthcare provider will tell you how much sodium and potassium are safe for you to have in a day  He or she may recommend that you limit sodium to 2,300 mg a day  · Follow the meal plan recommended by your healthcare provider  A dietitian or your provider can give you more information on low-sodium plans or the DASH (Dietary Approaches to Stop Hypertension) eating plan  The DASH plan is low in sodium, unhealthy fats, and total fat  It is high in potassium, calcium, and fiber  · Exercise to maintain a healthy weight    Exercise at least 30 minutes per day, on most days of the week  This will help decrease your blood pressure  Ask your healthcare provider about the best exercise plan for you  · Decrease stress  This may help lower your blood pressure  Learn ways to relax, such as deep breathing or listening to music  · Limit alcohol as directed  Alcohol can increase your blood pressure  Ask your healthcare provider if it is safe for you to drink alcohol  Also ask how much is safe for you to drink  · Do not smoke  Nicotine and other chemicals in cigarettes and cigars can increase your blood pressure and also cause lung damage  Ask your healthcare provider for information if you currently smoke and need help to quit  E-cigarettes or smokeless tobacco still contain nicotine  Talk to your healthcare provider before you use these products  © Copyright [address] Information is for End User's use only and may not be sold, redistributed or otherwise used for commercial purposes  All illustrations and images included in CareNotes® are the copyrighted property of A D A M , Inc  or [employer]   The above information is an  only  It is not intended as medical advice for individual conditions or treatments  Talk to your doctor, nurse or pharmacist before following any medical regimen to see if it is safe and effective for you  Amlodipine (By mouth)   Amlodipine ([id number])  Lowers high blood pressure and relieves chronic stable angina (chest pain)  This medicine is a calcium channel blocker  Brand Name(s): Valentín Conway   There may be other brand names for this medicine  When This Medicine Should Not Be Used: This medicine is not right for everyone  Do not use it if you had an allergic reaction to amlodipine  How to Use This Medicine:   Liquid, Tablet, Dissolving Tablet  · Take your medicine as directed   Your dose may need to be changed several times to find what works best for you  Take this medicine at the same time each day  · Read and follow the patient instructions that come with this medicine  Talk to your doctor or pharmacist if you have any questions  · Missed dose: Take a dose as soon as you remember  If it is almost time for your next dose, wait until then and take a regular dose  Do not take extra medicine to make up for a missed dose  · Store the medicine in a closed container at room temperature, away from heat, moisture, and direct light  Store the oral liquid in the refrigerator  Do not freeze  Drugs and Foods to Avoid:   Ask your doctor or pharmacist before using any other medicine, including over-the-counter medicines, vitamins, and herbal products  · Some medicines can affect how amlodipine works  Tell your doctor if you are also using clarithromycin, cyclosporine, diltiazem, itraconazole, ritonavir, sildenafil, simvastatin, or tacrolimus  Warnings While Using This Medicine:   · Tell your doctor if you are pregnant or breastfeeding, or if you have liver disease, or heart or blood vessel disease (including coronary artery disease, aortic stenosis)  · This medicine may cause the following problems:  ? Low blood pressure  ? Worsening chest pain  ? Increased risk of heart attack  · This medicine could lower your blood pressure too much, especially when you first use it or if you are dehydrated  Stand or sit up slowly if you feel lightheaded or dizzy  · Do not stop using this medicine without asking your doctor, even if you feel well  This medicine will not cure high blood pressure, but it will help keep it in normal range  You may have to take blood pressure medicine for the rest of your life  · Your doctor will check your progress and the effects of this medicine at regular visits  Keep all appointments  · Keep all medicine out of the reach of children  Never share your medicine with anyone    Possible Side Effects While Using This Medicine:   Call your doctor right away if you notice any of these side effects:  · Allergic reaction: Itching or hives, swelling in your face or hands, swelling or tingling in your mouth or throat, chest tightness, trouble breathing  · Lightheadedness, dizziness, fainting  · New or worsening chest pain  · Swelling in your hands, ankles, or legs  · Trouble breathing, nausea, unusual sweating  If you notice other side effects that you think are caused by this medicine, tell your doctor  Call your doctor for medical advice about side effects  You may report side effects to FDA at 5-794-FDA-2440  © Copyright [address] Information is for End User's use only and may not be sold, redistributed or otherwise used for commercial purposes  The above information is an  only  It is not intended as medical advice for individual conditions or treatments  Talk to your doctor, nurse or pharmacist before following any medical regimen to see if it is safe and effective for you

## 2021-01-22 NOTE — UTILIZATION REVIEW
Initial Clinical Review    Admission: Date/Time/Statement:   Admission Orders (From admission, onward)     Ordered        01/21/21 2054  Place in Observation  Once                   Orders Placed This Encounter   Procedures    Place in Observation     Standing Status:   Standing     Number of Occurrences:   1     Order Specific Question:   Level of Care     Answer:   Med Surg [16]     ED Arrival Information     Expected Arrival Acuity Means of Arrival Escorted By Service Admission Type    - 1/21/2021 17:03 Emergent Walk-In Family Member Hospitalist Emergency    Arrival Complaint    weakness        Chief Complaint   Patient presents with    Shaking     Onset of shakes and dizziness approx 30 minutes PTA, Denies CP, SOB, HA  Just feels "wierd"     Assessment/Plan:   80 yof generally healthy, only relevant medical history is GERD on Prilosec, presents to the emergency department with a chief complaint of dizziness, shakiness, and a general feeling of not being well  Patient is accompanied to room 11 with her family member which is a niece  The symptoms started approximately 4:00 p m  [employer] Patient awoke today feeling normal   Patient was able to ambulate up to 13 steps without difficulty  Prior surgical history is cataract extraction bilaterally, uneventful cholecystectomy 2015 and a history of an EGD in June 2019  Niece of the patient reports the patient was at her GI specialist last week and her blood pressure was 667 systolic    Chest pressure  Assessment & Plan  · Continue to trend troponins  · Monitor telemetry  · Consider cardiology consult any abnormalities     Hypertensive disorder  Assessment & Plan  · BP was significantly elevated when she presented to the ER at 180/110, 220/99 she was given clonidine she improved to 149/69  · Elevated blood pressures likely the source of her chest pressure  · Continue to monitor blood pressure  · Will start amlodipine in the morning  · Patient has not been on any medications however per the record she had hypertensive history     GERD (gastroesophageal reflux disease)  Assessment & Plan  · Continue PPI    ED Triage Vitals   Temperature Pulse Respirations Blood Pressure SpO2   01/21/21 1711 01/21/21 1711 01/21/21 1711 01/21/21 1711 01/21/21 1711   97 8 °F (36 6 °C) 89 14 (!) 180/110 99 %      Temp Source Heart Rate Source Patient Position - Orthostatic VS BP Location FiO2 (%)   01/21/21 1711 -- 01/21/21 2150 01/21/21 2150 --   Tympanic  Lying Left arm       Pain Score       01/21/21 2143       No Pain          Wt Readings from Last 1 Encounters:   01/21/21 48 1 kg (106 lb 0 7 oz)     Additional Vital Signs:   Date/Time  Temp  Pulse  Resp  BP  MAP (mmHg)  SpO2  O2 Device  Patient Position - Orthostatic VS   01/22/21 0734  95 2 °F (35 1 °C)Abnormal   80  16  137/75    99 %  None (Room air)  Sitting   01/22/21 0730              None (Room air)     01/22/21 0310  97 7 °F (36 5 °C)  80  18  113/57    98 %  None (Room air)  Lying   01/21/21 2150  97 3 °F (36 3 °C)Abnormal   82  18  132/92    98 %  None (Room air)  Lying   01/21/21 2058        149/69             Pertinent Labs/Diagnostic Test Results:   Results from last 7 days   Lab Units 01/21/21  1804   WBC Thousand/uL 4 40*   HEMOGLOBIN g/dL 12 1   HEMATOCRIT % 36 6*   PLATELETS Thousands/uL 197   NEUTROS ABS Thousands/µL 2 70     Results from last 7 days   Lab Units 01/22/21  0544 01/21/21  1804   SODIUM mmol/L 138 136   POTASSIUM mmol/L 3 6 3 5   CHLORIDE mmol/L 104 102   CO2 mmol/L 28 30   ANION GAP mmol/L 6 4   BUN mg/dL 10 12   CREATININE mg/dL 0 55* 0 64   EGFR ml/min/1 73sq m 85 80   CALCIUM mg/dL 8 8 8 9   MAGNESIUM mg/dL  --  2 3     Results from last 7 days   Lab Units 01/21/21  1804   AST U/L 15   ALT U/L 13   ALK PHOS U/L 40*   TOTAL PROTEIN g/dL 6 4   ALBUMIN g/dL 4 1   TOTAL BILIRUBIN mg/dL 0 40     Results from last 7 days   Lab Units 01/22/21  0544 01/21/21  1804   GLUCOSE RANDOM mg/dL 88 108* Results from last 7 days   Lab Units 01/22/21  0152 01/21/21  2231 01/21/21  1804   TROPONIN I ng/mL <0 03 <0 03 <0 03     Results from last 7 days   Lab Units 01/21/21  1804   PROTIME seconds 13 4   INR  1 03   PTT seconds 30     Results from last 7 days   Lab Units 01/21/21  1804   TSH 3RD GENERATON uIU/mL 4 620     Results from last 7 days   Lab Units 01/21/21  1804   LACTIC ACID mmol/L 0 7     Results from last 7 days   Lab Units 01/21/21 2002   CLARITY UA  Clear   COLOR UA  Yellow   SPEC GRAV UA  1 010   PH UA  7 0   GLUCOSE UA mg/dl Negative   KETONES UA mg/dl Negative   BLOOD UA  Negative   PROTEIN UA mg/dl Negative   NITRITE UA  Negative   BILIRUBIN UA  Negative   UROBILINOGEN UA E U /dl 0 2   LEUKOCYTES UA  Negative     1/21  Cxr=  No acute cardiopulmonary disease  CT head=  No acute intracranial abnormality  Ekg=  Normal sinus rhythm with an occasional PVC noted  There is a baseline artifact noted  No acute ST abnormalities      ED Treatment:   Medication Administration from 01/21/2021 1703 to 01/21/2021 2138       Date/Time Order Dose Route Action     01/21/2021 1906 cloNIDine (CATAPRES) tablet 0 1 mg 0 1 mg Oral Given        Past Medical History:   Diagnosis Date    Dysphagia     GERD (gastroesophageal reflux disease)      Present on Admission:   Chest pressure   GERD (gastroesophageal reflux disease)   Hypertensive disorder    Admitting Diagnosis: Dizziness [R42]  Shaky [R25 1]  Weakness [R53 1]  Hypertension [I10]  Age/Sex: 80 y o  female  Admission Orders:  Telemetry  Cont ST segment monitoring  Pt/ot eval & tx  Scd/foot pumps    Scheduled Medications:  amLODIPine, 5 mg, Oral, Daily  enoxaparin, 40 mg, Subcutaneous, Daily  melatonin, 6 mg, Oral, HS  pantoprazole, 20 mg, Oral, Early Morning    PRN Meds:  acetaminophen, 650 mg, Oral, Q4H PRN  ALPRAZolam, 0 25 mg, Oral, BID PRN  nitroglycerin, 0 4 mg, Sublingual, Q5 Min PRN  ondansetron, 4 mg, Intravenous, Q6H PRN    Network Utilization Review Department  ATTENTION: Please call with any questions or concerns to [phone] and carefully listen to the prompts so that you are directed to the right person  All voicemails are confidential   Tyshawn Milton all requests for admission clinical reviews, approved or denied determinations and any other requests to dedicated fax number below belonging to the campus where the patient is receiving treatment   List of dedicated fax numbers for the Facilities:  [address] DENIALS (Administrative/Medical Necessity) [phone]   [address] (Maternity/NICU/Pediatrics) [phone]   [address] [place] [address] Dr Rodgers [facility] Sterling Andres 1278 (  Garrett Campbell "Iva" 103) [phone] [address] Jeimy [place] 1481 [address] [place] [address] [phone]

## 2021-01-22 NOTE — PHYSICAL THERAPY NOTE
Physical Therapy Evaluation     Patient's Name: Ranny Cousins    Admitting Diagnosis  Dizziness [R42]  Shaky [R25 1]  Weakness [R53 1]  Hypertension [I10]    Problem List  Patient Active Problem List   Diagnosis    Chest pressure    GERD (gastroesophageal reflux disease)    Hyperlipidemia    Hypertensive disorder       Past Medical History  Past Medical History:   Diagnosis Date    Dysphagia     GERD (gastroesophageal reflux disease)        Past Surgical History  Past Surgical History:   Procedure Laterality Date    CATARACT EXTRACTION Bilateral     [place] area    CHOLECYSTECTOMY  2015    EGD      x2 and was dilated    HYSTERECTOMY            01/22/21 0752   PT Last Visit   PT Visit Date 01/22/21   Note Type   Note type Evaluation   Pain Assessment   Pain Assessment Tool Pain Assessment not indicated - pt denies pain   Pain Score No Pain   Home Living   Type of [address] [place] [address] Two level;Bed/bath upstairs; Access  (0 STEWART)   Bathroom Shower/Tub Walk-in shower   Bathroom Toilet   (n)   Bathroom Equipment Grab bars in [facility] [address]   (no AD at baseline)   Prior Function   Level of [address] with ADLs and functional mobility   Lives With Family  (pt moved into her cousin's home)   Receives Help From [facility], [address] in the last 6 months 0   Vocational Retired   Comments pt does not drive   Restrictions/Precautions   [place] Bearing Precautions Per Order No   Other Precautions Telemetry; Fall Risk   General   Family/Caregiver Present No   Cognition   Arousal/Participation Alert   Orientation Level Oriented X4  (pt initially reporting month = Feb, self corrects to Jan)   Memory Decreased recall of precautions   Following Commands Follows one step commands without difficulty   Comments pt agreeable to PT session   RLE Assessment   RLE Assessment WFL  (5/5)   LLE Assessment   LLE Assessment WFL  (5/5)   Coordination   Movements are Fluid and Coordinated 1   Sensation WFL   Light Touch   RLE Light Touch Grossly intact   LLE Light Touch Grossly intact   Bed Mobility   Supine to Sit 6  Modified independent   Additional items HOB elevated   Transfers   Sit to Stand 7  Independent   Stand to Sit 7  Independent   Additional Comments no overt LOB c mobility tasks, pt denies any lightheadedness/dizziness   Ambulation/Elevation   Gait pattern Short stride;Decreased foot clearance   Gait Assistance 6  Modified independent   Assistive Device None   Distance 150 ft   Stair Management Assistance Not tested   Balance   Static Sitting Normal   Dynamic Sitting Good   Static Standing Good   Dynamic Standing Good   Ambulatory Fair +   Endurance Deficit   Endurance Deficit No   Activity Tolerance   Activity Tolerance Patient tolerated treatment well   Nurse Made Aware Yes, RN Jena Hennessy made aware of outcomes/recs   Assessment   Prognosis Good   Assessment Pt is 80 y o  female seen for PT evaluation s/p admit to [address] on 1/21/2021 w/ Chest pressure; pt presented to ED c shaking episode  PT consulted to assess pt's functional mobility and d/c needs  Order placed for PT eval and tx, w/ up and OOB as tolerated order  Comorbidities affecting pt's physical performance at time of assessment include: hypertensive disorder, GERD  PTA, pt was independent w/ all functional mobility w/ no AD, ambulates community distances and elevations, ambulates household distances, lives w/ cousin in 2 level home and retired  Personal factors affecting pt at time of IE include: lives in 2 story house and limited insight into impairments  Please find objective findings from PT assessment regarding body systems outlined above  The following objective measures performed on IE: Barthel Index: 85/100, Modified Chandu: 1 (no significant disability) and AM-PAC 6-Clicks: 19/11   Pt's clinical presentation is currently unstable/unpredictable seen in pt's presentation of ongoing medical assessment, elevated BP on admission  From PT/mobility standpoint, pt appears to be functioning close to or at mobility baseline, therefore no further immediate skilled PT needs are warranted at this time  Pt currently performing all phases of functional mobility at independent level without need for AD  Recommend pt continue to mobilize ad mahsa while here  Recommend pt return to previous living environment once medically cleared  Will sign off, D/C PT  Please reconsult if further immediate skilled PT needs are warranted     Barriers to Discharge None   Goals   Patient Goals "to get home"   PT Treatment Day 0   Plan   Treatment/Interventions Spoke to nursing;Spoke to case management;Spoke to MD   PT Frequency One time visit   Recommendation   PT Discharge Recommendation Return to previous environment with social support  (no skilled PT needs warranted at this time)   Equipment Recommended   (none at this time)   PT - OK to Discharge Yes  (when medically cleared)   AM-PAC Basic Mobility Inpatient   Turning in Bed Without Bedrails 4   Lying on Back to Sitting on Edge of Flat Bed 4   Moving Bed to Chair 4   Standing Up From Chair 4   Walk in Room 4   Climb 3-5 Stairs 3   Basic Mobility Inpatient Raw Score 23   Basic Mobility Standardized Score 50 88   Modified Tumtum Scale   Modified Tumtum Scale 1   Barthel Index   Feeding 10   Bathing 5   Grooming Score 5   Dressing Score 10   Bladder Score 10   Bowels Score 10   Toilet Use Score 10   Transfers (Bed/Chair) Score 15   Mobility (Level Surface) Score 10   Stairs Score 0  (DNT)   Barthel Index Score 85         Jose Raul Cleaning, PT

## 2021-01-22 NOTE — ASSESSMENT & PLAN NOTE
· Likely secondary to hypertensive crisis  · No further episodes of chest pain, troponins negative x3  · Recommend outpatient follow-up with PCP

## 2021-01-22 NOTE — H&P
H&P- Efren Lamar 1933, 80 y o  female MRN: [id number]    Unit/Bed#: -02 Encounter: [id number]    Primary Care Provider: Yoli Garcia MD   Date and time admitted to hospital: 1/21/2021  5:07 PM        * Chest pressure  Assessment & Plan  · Continue to trend troponins  · Monitor telemetry  · Consider cardiology consult any abnormalities    Hypertensive disorder  Assessment & Plan  · BP was significantly elevated when she presented to the ER at 180/110, 220/99 she was given clonidine she improved to 149/69  · Elevated blood pressures likely the source of her chest pressure  · Continue to monitor blood pressure  · Will start amlodipine in the morning  · Patient has not been on any medications however per the record she had hypertensive history    GERD (gastroesophageal reflux disease)  Assessment & Plan  · Continue PPI        VTE Prophylaxis: Enoxaparin (Lovenox)  Code Status: DNR/DNI - "meet my "  POLST: There is no POLST form on file for this patient (pre-hospital)  Discussion with patient    Anticipated Length of Stay:  Patient will be admitted on an Observation basis with an anticipated length of stay of  < 2 midnights  Justification for Hospital Stay: ACS rule out, BP monitoring    Chief Complaint:   Shaking episode    History of Present Illness: Efren Lamar is a 80 y o  female who presents with shaking episode  Patient with past medical history of GERD, cataracts, prior records of hypertension and hyperlipidemia presented to the ER with onset of stroke shakes, dizziness that also noted some chest pressure  She was found have elevated blood pressure in the ER  She was seen by GI last week and had a systolic blood pressure in the 116  She also was noted to have PVCs on the monitor  Patient reports she woke up fine, middle of the day she started to not feel right    She started to get nervous and came to ER   "nervous" Niece reported in ER chest pressure, patient currently denies  Review of Systems:  Review of Systems   Constitutional: Negative for chills, diaphoresis and fever  HENT: Negative for rhinorrhea, sore throat and trouble swallowing  Eyes: Negative for pain and discharge  Respiratory: Negative for cough and shortness of breath  Cardiovascular: Negative for chest pain, palpitations and leg swelling  Gastrointestinal: Negative for abdominal pain, diarrhea, nausea and vomiting  Genitourinary: Negative for dysuria and hematuria  Musculoskeletal: Negative for back pain and neck pain  Skin: Negative for rash and wound  Neurological: Negative for dizziness  Psychiatric/Behavioral: Negative for confusion  The patient is nervous/anxious  Past Medical and Surgical History:   Past Medical History:   Diagnosis Date    Dysphagia     GERD (gastroesophageal reflux disease)        Past Surgical History:   Procedure Laterality Date    CATARACT EXTRACTION Bilateral     [facility] area    CHOLECYSTECTOMY  2015    EGD      x2 and was dilated       Meds/Allergies:  Prior to Admission medications    Medication Sig Start Date End Date Taking? Authorizing Provider   ibuprofen (MOTRIN) 400 mg tablet Take 1 tablet (400 mg total) by mouth every 6 (six) hours as needed for mild pain or moderate pain 7/20/19   Lugenia Bath Remaley, DO   methocarbamol (ROBAXIN) 500 mg tablet Take 1 tablet (500 mg total) by mouth 3 (three) times a day as needed for muscle spasms for up to 20 doses 7/20/19   Lugenia Bath Remaley, DO   Multiple Vitamins-Minerals (PRESERVISION AREDS PO) Take 1 caplet by mouth 2 (two) times a day    Historical Provider, MD   omeprazole (PriLOSEC) 20 mg delayed release capsule Take 20 mg by mouth every morning    Historical Provider, MD     I have reviewed home medications with patient personally      Allergies: No Known Allergies    Social History:  Marital Status: /Civil Union   Occupation:  Retired  Patient Pre-hospital Living Situation:  Home  Patient Pre-hospital Level of Mobility:  Mobile  Patient Pre-hospital Diet Restrictions:  None  Substance Use History:   Social History     Substance and Sexual Activity   Alcohol Use Not Currently     Social History     Tobacco Use   Smoking Status Never Smoker   Smokeless Tobacco Never Used     Social History     Substance and Sexual Activity   Drug Use Never       Family History:  I have reviewed the patients family history    Physical Exam:   Vitals:   Blood Pressure: 149/69 (01/21/21 2058)  Pulse: 89 (01/21/21 1711)  Temperature: 97 8 °F (36 6 °C) (01/21/21 1711)  Temp Source: Tympanic (01/21/21 1711)  Respirations: 14 (01/21/21 1711)  Weight - Scale: (P) 48 1 kg (106 lb 0 7 oz) (01/21/21 2150)  SpO2: 99 % (01/21/21 1711)    Physical Exam  Vitals signs reviewed  Constitutional:       Appearance: Normal appearance  Comments: Pleasant Elderly  female   HENT:      Head: Normocephalic and atraumatic  Nose: Nose normal    Eyes:      General:         Right eye: No discharge  Left eye: No discharge  Extraocular Movements: Extraocular movements intact  Conjunctiva/sclera: Conjunctivae normal    Neck:      Musculoskeletal: Normal range of motion  Cardiovascular:      Rate and Rhythm: Normal rate and regular rhythm  Pulmonary:      Effort: Pulmonary effort is normal  No respiratory distress  Breath sounds: Normal breath sounds  No wheezing  Abdominal:      General: Bowel sounds are normal  There is no distension  Palpations: Abdomen is soft  Tenderness: There is no abdominal tenderness  There is no guarding  Musculoskeletal: Normal range of motion  General: No swelling or tenderness  Right lower leg: No edema  Left lower leg: No edema  Skin:     General: Skin is warm and dry  Capillary Refill: Capillary refill takes less than 2 seconds  Neurological:      General: No focal deficit present  Mental Status: She is alert   Mental status is at baseline  Comments: Speech intact, moving extremities   Psychiatric:         Mood and Affect: Mood normal          Behavior: Behavior normal          Additional Data:   Lab Results: I have personally reviewed pertinent reports  Results from last 7 days   Lab Units 01/21/21  1804   WBC Thousand/uL 4 40*   HEMOGLOBIN g/dL 12 1   HEMATOCRIT % 36 6*   PLATELETS Thousands/uL 197   NEUTROS PCT % 63   LYMPHS PCT % 22   MONOS PCT % 14*   EOS PCT % 1     Results from last 7 days   Lab Units 01/21/21  1804   SODIUM mmol/L 136   POTASSIUM mmol/L 3 5   CHLORIDE mmol/L 102   CO2 mmol/L 30   BUN mg/dL 12   CREATININE mg/dL 0 64   CALCIUM mg/dL 8 9   ALK PHOS U/L 40*   ALT U/L 13   AST U/L 15     Results from last 7 days   Lab Units 01/21/21  1804   INR  1 03       Imaging: I have personally reviewed pertinent reports  CT head without contrast   Final Result by Ridge Noriega DO (01/21 1838)   No acute intracranial abnormality  Workstation performed: [id number]         XR chest 1 view portable    (Results Pending)       Epic Records Reviewed: Yes     ** Please Note: This note has been constructed using a voice recognition system   **

## 2021-01-22 NOTE — ASSESSMENT & PLAN NOTE
· After Obtaining further collateral from the knee start Re, patient appears to have undiagnosed dementia  · She is forgetful, has difficulty recalling from short and long-term memory, and is socially distant  · Could have underlying dementia versus depression  · Would recommend follow-up with PCP

## 2021-01-22 NOTE — NURSING NOTE
Pt discharged at this time  All belongings sent home with pt  Discharge instructions read and reviewed with pt with all questions answered to pt's satisfaction

## 2021-01-22 NOTE — CASE MANAGEMENT
Assessment completed, pt was made aware of cm role and she was made aware she is here as an obs status, pt did give me permission to call her [facility], I called her at 10:30 am to #[phone] to review my role as a cm and to make her aware that the pt is here as an obs status, pt does have dementia, pt lives with ehr niece in a 2 story home with also a basement and she does not use, 12 steps to her bedroom, br 1st & 2nd floor, pt's niece is her POA and care giver, Niece does the cooking, cleaning, shopping, driving and meds, pt is able to dress and bath herself, RX plan [facility], , no hx of [facility], pt does not smoke or drink alcohol, DME: bp cuff, bed that the head elevates, commode, pt's niece denies any d/c needs and she will transport the pt home, I placed a call to her again a 10:56 am to # [phone] to make her aware of the d/c , she stated the doctor called her and she is in agreement with the d/c and d/c plan home, she will transport the pt home at 1pm today, CM reviewed d/c planning process including the following: identifying help at home, patient preference for d/c planning needs, availability of treatment team to discuss questions or concerns patient and/or family may have regarding understanding medications and recognizing signs and symptoms once discharged  CM also encouraged patient to follow up with all recommended appointments after discharge  Patient advised of importance for patient and family to participate in managing patients medical well being

## 2021-01-22 NOTE — PLAN OF CARE
Problem: Potential for Falls  Goal: Patient will remain free of falls  Description: INTERVENTIONS:  - Assess patient frequently for physical needs  -  Identify cognitive and physical deficits and behaviors that affect risk of falls    -  [place] fall precautions as indicated by assessment   - Educate patient/family on patient safety including physical limitations  - Instruct patient to call for assistance with activity based on assessment  - Modify environment to reduce risk of injury  - Consider OT/PT consult to assist with strengthening/mobility  Outcome: Progressing     Problem: DISCHARGE PLANNING  Goal: Discharge to home or other facility with appropriate resources  Description: INTERVENTIONS:  - Identify barriers to discharge w/patient and caregiver  - Arrange for needed discharge resources and transportation as appropriate  - Identify discharge learning needs (meds, wound care, etc )  - Refer to Case Management Department for coordinating discharge planning if the patient needs post-hospital services based on physician/advanced practitioner order or complex needs related to functional status, cognitive ability, or social support system  Outcome: Progressing     Problem: CARDIOVASCULAR - ADULT  Goal: Maintains optimal cardiac output and hemodynamic stability  Description: INTERVENTIONS:  - Monitor I/O, vital signs and rhythm  - Monitor for S/S and trends of decreased cardiac output  - Administer and titrate ordered vasoactive medications to optimize hemodynamic stability  - Assess quality of pulses, skin color and temperature  - Assess for signs of decreased coronary artery perfusion  - Instruct patient to report change in severity of symptoms  Outcome: Progressing  Goal: Absence of cardiac dysrhythmias or at baseline rhythm  Description: INTERVENTIONS:  - Continuous cardiac monitoring, vital signs, obtain 12 lead EKG if ordered  - Administer antiarrhythmic and heart rate control medications as ordered  - Monitor electrolytes and administer replacement therapy as ordered  Outcome: Progressing

## 2021-01-22 NOTE — ASSESSMENT & PLAN NOTE
· BP was significantly elevated when she presented to the ER at 180/110, 220/99 she was given clonidine she improved to 149/69  · Elevated blood pressures likely the source of her chest pressure  · Continue to monitor blood pressure  · Will start amlodipine in the morning  · Patient has not been on any medications however per the record she had hypertensive history

## 2021-01-22 NOTE — PLAN OF CARE
Pt discharged at this time   Problem: Potential for Falls  Goal: Patient will remain free of falls  Description: INTERVENTIONS:  - Assess patient frequently for physical needs  -  Identify cognitive and physical deficits and behaviors that affect risk of falls    -  [place] fall precautions as indicated by assessment   - Educate patient/family on patient safety including physical limitations  - Instruct patient to call for assistance with activity based on assessment  - Modify environment to reduce risk of injury  - Consider OT/PT consult to assist with strengthening/mobility  Outcome: Adequate for Discharge     Problem: DISCHARGE PLANNING  Goal: Discharge to home or other facility with appropriate resources  Description: INTERVENTIONS:  - Identify barriers to discharge w/patient and caregiver  - Arrange for needed discharge resources and transportation as appropriate  - Identify discharge learning needs (meds, wound care, etc )  - Refer to Case Management Department for coordinating discharge planning if the patient needs post-hospital services based on physician/advanced practitioner order or complex needs related to functional status, cognitive ability, or social support system  Outcome: Adequate for Discharge     Problem: CARDIOVASCULAR - ADULT  Goal: Maintains optimal cardiac output and hemodynamic stability  Description: INTERVENTIONS:  - Monitor I/O, vital signs and rhythm  - Monitor for S/S and trends of decreased cardiac output  - Administer and titrate ordered vasoactive medications to optimize hemodynamic stability  - Assess quality of pulses, skin color and temperature  - Assess for signs of decreased coronary artery perfusion  - Instruct patient to report change in severity of symptoms  Outcome: Adequate for Discharge  Goal: Absence of cardiac dysrhythmias or at baseline rhythm  Description: INTERVENTIONS:  - Continuous cardiac monitoring, vital signs, obtain 12 lead EKG if ordered  - Administer antiarrhythmic and heart rate control medications as ordered  - Monitor electrolytes and administer replacement therapy as ordered  Outcome: Adequate for Discharge

## 2021-01-22 NOTE — DISCHARGE SUMMARY
Discharge- Jennifer Maurer 1933, 80 y o  female MRN: [id number]    Unit/Bed#: -01 Encounter: [id number]    Primary Care Provider: Musa Almeida MD   Date and time admitted to hospital: 1/21/2021  5:07 PM        Dementia [facility])  Assessment & Plan  · After Obtaining further collateral from the knee start Re, patient appears to have undiagnosed dementia  · She is forgetful, has difficulty recalling from short and long-term memory, and is socially distant  · Could have underlying dementia versus depression  · Would recommend follow-up with PCP    Hypertensive crisis  Assessment & Plan  · No history of hypertension previously  · Blood pressure on initial arrival in the 602S to 544 systolic  · Patient attributes blood pressure to anxiety  · CT head negative for acute pathology, troponins negative x3, all labs within normal limits collected during ED and inpatient admission  · Patient has follow-up with PCP on 01/25/2021  · Will discharge patient with amlodipine 5 mg daily, blood pressure device kit, and recommendation to record vital so PCP can evaluate for hypertension and management    GERD (gastroesophageal reflux disease)  Assessment & Plan  · Continue PPI    * Chest pressure  Assessment & Plan  · Likely secondary to hypertensive crisis  · No further episodes of chest pain, troponins negative x3  · Recommend outpatient follow-up with PCP      Discharge Summary - [facility]    Patient Information: Jennifer Maurer 80 y o  female MRN: [id number]  Unit/Bed#: -01 Encounter: [id number]    Discharging Physician / Practitioner: Deirdre Ray MD  PCP: Musa Almeida MD  Admission Date: 1/21/2021  Discharge Date: 01/22/21    Reason for Admission:  Hypertensive crisis    Discharge Diagnoses:     Principal Problem:    Chest pressure  Active Problems:    GERD (gastroesophageal reflux disease)    Hypertensive crisis    Dementia ([facility] [address] )  Resolved Problems:    * No resolved hospital problems  *    Present on Admission:   Chest pressure   GERD (gastroesophageal reflux disease)   Hypertensive crisis    Consultations During Hospital Stay:  · None    Procedures Performed:     · Imaging    Significant Findings:     · Hypertensive crisis    Incidental Findings:   · None     Test Results Pending at Discharge (will require follow up): · None     Outpatient Tests Requested:  · Follow-up with PCP    Complications:  None    Hospital Course: Hilario Buck is a 80 y o  female patient who originally presented to the hospital on 1/21/2021 due to hypertensive crisis  Patient attributes symptoms to anxiety which resolved in the ED  Her blood pressure eventually normalize, which resulted in resolution of anxiety and chest pain  Suspecting hypertensive crisis as etiology of above symptoms  Recommended for discharge with amlodipine 5 mg and blood pressure kit to monitor blood pressure at home prior to patient's PCP appointment on 01/25/2021  Stable for discharge on 01/22/2021  Condition at Discharge: good     Discharge Day Visit / Exam:     Subjective:  Patient feels well, no chest pain, chest palpitations, shortness of breath, no headache, no blurred vision, appetite is good  Vitals: Blood Pressure: 137/75 (01/22/21 0734)  Pulse: 80 (01/22/21 0734)  Temperature: (!) 95 2 °F (35 1 °C) (01/22/21 0734)  Temp Source: Temporal (01/22/21 0734)  Respirations: 16 (01/22/21 0734)  Height: 5' 5" (165 1 cm) (01/21/21 2150)  Weight - Scale: 48 1 kg (106 lb 0 7 oz) (01/21/21 2150)  SpO2: 99 % (01/22/21 0734)  Exam:        Vital signs are reviewed as above  Constitutional:  Patient in bed  Patient laying comfortably  Eyes: EOM grossly intact  Conjunctivae slightly pale  Patient has anicteric sclera  HENT: Oropharynx are moist  Did not notice any significant lesions on the tongue  Head normocephalic  Neck: Neck is supple  I was not able to visualize any JVD at 90°  There is no significant lymphadenopathy   I also did not notice any significant thyromegaly  Cardiac: I did not hear any rubs or gallop  Patient appears to be in sinus rhythm  Respiratory: Patient not in significant respiratory distress  Air entry in general is adequate, no conversational dyspnea, satting above 90% on room air  GI: Abdomen is soft  It is grossly nontender  Bowel sounds are adequate  I was not able to appreciate any hepatosplenomegaly  Neurologic:  Patient is awake and alert  Neurological examination is grossly intact  No obvious focal neurological deficit noticed  Skin: Skin is warm and dry  Psychiatric: Mood and affect are pleasant  Musculoskeletal  Patient moving all extremities  Extremities: Patient has no significant cyanosis, clubbing, or lower extremity edema               Discharge instructions/Information to patient and family:   See after visit summary for information provided to patient and family  Provisions for Follow-Up Care:  See after visit summary for information related to follow-up care and any pertinent home health orders  Disposition:     Home    For Discharges to [facility] SNF:   · Not Applicable to this Patient - Not Applicable to this Patient    Planned Readmission: none     Discharge Statement:  I spent 30+ minutes discharging the patient  This time was spent on the day of discharge  I had direct contact with the patient on the day of discharge  Greater than 50% of the total time was spent examining patient, answering all patient questions, arranging and discussing plan of care with patient as well as directly providing post-discharge instructions  Additional time then spent on discharge activities  Discharge Medications:  See after visit summary for reconciled discharge medications provided to patient and family  ** Please Note: Dragon 360 Dictation voice to text software may have been used in the creation of this document   **

## 2021-01-22 NOTE — ASSESSMENT & PLAN NOTE
· No history of hypertension previously  · Blood pressure on initial arrival in the 640C to 704 systolic  · Patient attributes blood pressure to anxiety  · CT head negative for acute pathology, troponins negative x3, all labs within normal limits collected during ED and inpatient admission  · Patient has follow-up with PCP on 01/25/2021  · Will discharge patient with amlodipine 5 mg daily, blood pressure device kit, and recommendation to record vital so PCP can evaluate for hypertension and management

## 2023-04-23 ENCOUNTER — APPOINTMENT (EMERGENCY)
Dept: CT IMAGING | Facility: HOSPITAL | Age: 88
End: 2023-04-23

## 2023-04-23 ENCOUNTER — HOSPITAL ENCOUNTER (INPATIENT)
Facility: HOSPITAL | Age: 88
LOS: 5 days | Discharge: HOME | End: 2023-04-28
Attending: EMERGENCY MEDICINE | Admitting: INTERNAL MEDICINE

## 2023-04-23 ENCOUNTER — APPOINTMENT (EMERGENCY)
Dept: RADIOLOGY | Facility: HOSPITAL | Age: 88
End: 2023-04-23

## 2023-04-23 DIAGNOSIS — R10.9 ABDOMINAL PAIN: Primary | ICD-10-CM

## 2023-04-23 DIAGNOSIS — J18.9 PNEUMONIA: ICD-10-CM

## 2023-04-23 DIAGNOSIS — E44.0 MODERATE PROTEIN-CALORIE MALNUTRITION (HCC): ICD-10-CM

## 2023-04-23 DIAGNOSIS — F03.94 DEMENTIA WITH ANXIETY, UNSPECIFIED DEMENTIA SEVERITY, UNSPECIFIED DEMENTIA TYPE (HCC): ICD-10-CM

## 2023-04-23 DIAGNOSIS — I48.91 ATRIAL FIBRILLATION WITH RAPID VENTRICULAR RESPONSE (HCC): ICD-10-CM

## 2023-04-23 DIAGNOSIS — K56.7 ILEUS (HCC): ICD-10-CM

## 2023-04-23 DIAGNOSIS — R11.2 NAUSEA AND VOMITING: ICD-10-CM

## 2023-04-23 DIAGNOSIS — R33.9 URINARY RETENTION: ICD-10-CM

## 2023-04-23 PROBLEM — I10 PRIMARY HYPERTENSION: Status: ACTIVE | Noted: 2023-04-23

## 2023-04-23 PROBLEM — K59.00 CONSTIPATED: Status: ACTIVE | Noted: 2023-04-23

## 2023-04-23 PROBLEM — R93.5 ABNORMAL CT OF THE ABDOMEN: Status: ACTIVE | Noted: 2023-04-23

## 2023-04-23 PROBLEM — F41.9 ANXIETY: Status: ACTIVE | Noted: 2021-03-29

## 2023-04-23 LAB
ALBUMIN SERPL BCP-MCNC: 4 G/DL (ref 3.5–5)
ALP SERPL-CCNC: 89 U/L (ref 34–104)
ALT SERPL W P-5'-P-CCNC: 14 U/L (ref 7–52)
ANION GAP SERPL CALCULATED.3IONS-SCNC: 7 MMOL/L (ref 4–13)
AST SERPL W P-5'-P-CCNC: 19 U/L (ref 13–39)
BASOPHILS # BLD AUTO: 0.01 THOUSANDS/ΜL (ref 0–0.1)
BASOPHILS NFR BLD AUTO: 0 % (ref 0–1)
BILIRUB SERPL-MCNC: 0.42 MG/DL (ref 0.2–1)
BUN SERPL-MCNC: 10 MG/DL (ref 5–25)
CALCIUM SERPL-MCNC: 9.5 MG/DL (ref 8.4–10.2)
CARDIAC TROPONIN I PNL SERPL HS: 4 NG/L
CHLORIDE SERPL-SCNC: 103 MMOL/L (ref 96–108)
CO2 SERPL-SCNC: 32 MMOL/L (ref 21–32)
CREAT SERPL-MCNC: 0.51 MG/DL (ref 0.6–1.3)
EOSINOPHIL # BLD AUTO: 0.07 THOUSAND/ΜL (ref 0–0.61)
EOSINOPHIL NFR BLD AUTO: 1 % (ref 0–6)
ERYTHROCYTE [DISTWIDTH] IN BLOOD BY AUTOMATED COUNT: 14.5 % (ref 11.6–15.1)
GFR SERPL CREATININE-BSD FRML MDRD: 85 ML/MIN/1.73SQ M
GLUCOSE SERPL-MCNC: 119 MG/DL (ref 65–140)
HCT VFR BLD AUTO: 39.9 % (ref 34.8–46.1)
HGB BLD-MCNC: 13.2 G/DL (ref 11.5–15.4)
IMM GRANULOCYTES # BLD AUTO: 0.03 THOUSAND/UL (ref 0–0.2)
IMM GRANULOCYTES NFR BLD AUTO: 0 % (ref 0–2)
LIPASE SERPL-CCNC: 33 U/L (ref 11–82)
LYMPHOCYTES # BLD AUTO: 0.42 THOUSANDS/ΜL (ref 0.6–4.47)
LYMPHOCYTES NFR BLD AUTO: 4 % (ref 14–44)
MAGNESIUM SERPL-MCNC: 2.3 MG/DL (ref 1.9–2.7)
MCH RBC QN AUTO: 32.4 PG (ref 26.8–34.3)
MCHC RBC AUTO-ENTMCNC: 33.1 G/DL (ref 31.4–37.4)
MCV RBC AUTO: 98 FL (ref 82–98)
MONOCYTES # BLD AUTO: 0.59 THOUSAND/ΜL (ref 0.17–1.22)
MONOCYTES NFR BLD AUTO: 6 % (ref 4–12)
NEUTROPHILS # BLD AUTO: 9 THOUSANDS/ΜL (ref 1.85–7.62)
NEUTS SEG NFR BLD AUTO: 89 % (ref 43–75)
NRBC BLD AUTO-RTO: 0 /100 WBCS
PHOSPHATE SERPL-MCNC: 3.9 MG/DL (ref 2.3–4.1)
PLATELET # BLD AUTO: 261 THOUSANDS/UL (ref 149–390)
PMV BLD AUTO: 10.2 FL (ref 8.9–12.7)
POTASSIUM SERPL-SCNC: 3.7 MMOL/L (ref 3.5–5.3)
PROCALCITONIN SERPL-MCNC: 0.05 NG/ML
PROT SERPL-MCNC: 7 G/DL (ref 6.4–8.4)
RBC # BLD AUTO: 4.08 MILLION/UL (ref 3.81–5.12)
SODIUM SERPL-SCNC: 142 MMOL/L (ref 135–147)
WBC # BLD AUTO: 10.12 THOUSAND/UL (ref 4.31–10.16)

## 2023-04-23 RX ORDER — TRAZODONE HYDROCHLORIDE 50 MG/1
50 TABLET ORAL
COMMUNITY

## 2023-04-23 RX ORDER — SERTRALINE HYDROCHLORIDE 25 MG/1
25 TABLET, FILM COATED ORAL
COMMUNITY

## 2023-04-23 RX ORDER — POLYETHYLENE GLYCOL 3350 17 G/17G
17 POWDER, FOR SOLUTION ORAL DAILY
Status: DISCONTINUED | OUTPATIENT
Start: 2023-04-23 | End: 2023-04-23

## 2023-04-23 RX ORDER — TRAZODONE HYDROCHLORIDE 50 MG/1
50 TABLET ORAL
Status: DISCONTINUED | OUTPATIENT
Start: 2023-04-23 | End: 2023-04-28 | Stop reason: HOSPADM

## 2023-04-23 RX ORDER — PANTOPRAZOLE SODIUM 40 MG/1
40 TABLET, DELAYED RELEASE ORAL
Status: DISCONTINUED | OUTPATIENT
Start: 2023-04-23 | End: 2023-04-23

## 2023-04-23 RX ORDER — CLONAZEPAM 0.5 MG/1
0.5 TABLET ORAL 2 TIMES DAILY
COMMUNITY

## 2023-04-23 RX ORDER — DIAPER,BRIEF,INFANT-TODD,DISP
EACH MISCELLANEOUS 3 TIMES DAILY
Status: DISCONTINUED | OUTPATIENT
Start: 2023-04-23 | End: 2023-04-23

## 2023-04-23 RX ORDER — SODIUM CHLORIDE 9 MG/ML
75 INJECTION, SOLUTION INTRAVENOUS CONTINUOUS
Status: DISCONTINUED | OUTPATIENT
Start: 2023-04-23 | End: 2023-04-25

## 2023-04-23 RX ORDER — WATER 1000 ML/1000ML
INJECTION, SOLUTION INTRAVENOUS
Status: COMPLETED
Start: 2023-04-23 | End: 2023-04-23

## 2023-04-23 RX ORDER — SERTRALINE HYDROCHLORIDE 25 MG/1
25 TABLET, FILM COATED ORAL EVERY MORNING
Status: DISCONTINUED | OUTPATIENT
Start: 2023-04-23 | End: 2023-04-28 | Stop reason: HOSPADM

## 2023-04-23 RX ORDER — AMLODIPINE BESYLATE 5 MG/1
5 TABLET ORAL DAILY
Status: DISCONTINUED | OUTPATIENT
Start: 2023-04-23 | End: 2023-04-28 | Stop reason: HOSPADM

## 2023-04-23 RX ORDER — CEFTRIAXONE 1 G/50ML
1000 INJECTION, SOLUTION INTRAVENOUS EVERY 24 HOURS
Status: DISCONTINUED | OUTPATIENT
Start: 2023-04-24 | End: 2023-04-28

## 2023-04-23 RX ORDER — CEFTRIAXONE 1 G/50ML
1000 INJECTION, SOLUTION INTRAVENOUS ONCE
Status: COMPLETED | OUTPATIENT
Start: 2023-04-23 | End: 2023-04-23

## 2023-04-23 RX ORDER — ACETAMINOPHEN 325 MG/1
650 TABLET ORAL EVERY 6 HOURS PRN
Status: DISCONTINUED | OUTPATIENT
Start: 2023-04-23 | End: 2023-04-28 | Stop reason: HOSPADM

## 2023-04-23 RX ORDER — POLYETHYLENE GLYCOL 3350 17 G/17G
POWDER, FOR SOLUTION ORAL DAILY
Status: ON HOLD | COMMUNITY
End: 2023-04-23

## 2023-04-23 RX ORDER — CLONAZEPAM 0.5 MG/1
0.5 TABLET ORAL 2 TIMES DAILY
Status: DISCONTINUED | OUTPATIENT
Start: 2023-04-23 | End: 2023-04-28 | Stop reason: HOSPADM

## 2023-04-23 RX ORDER — DIAPER,BRIEF,INFANT-TODD,DISP
EACH MISCELLANEOUS 3 TIMES DAILY
COMMUNITY
End: 2023-04-28

## 2023-04-23 RX ORDER — ONDANSETRON 2 MG/ML
4 INJECTION INTRAMUSCULAR; INTRAVENOUS ONCE
Status: COMPLETED | OUTPATIENT
Start: 2023-04-23 | End: 2023-04-23

## 2023-04-23 RX ORDER — OLANZAPINE 10 MG/1
5 INJECTION, POWDER, LYOPHILIZED, FOR SOLUTION INTRAMUSCULAR
Status: DISCONTINUED | OUTPATIENT
Start: 2023-04-23 | End: 2023-04-27

## 2023-04-23 RX ORDER — ENOXAPARIN SODIUM 100 MG/ML
40 INJECTION SUBCUTANEOUS 2 TIMES DAILY
Status: DISCONTINUED | OUTPATIENT
Start: 2023-04-23 | End: 2023-04-25

## 2023-04-23 RX ADMIN — CEFTRIAXONE 1000 MG: 1 INJECTION, SOLUTION INTRAVENOUS at 05:41

## 2023-04-23 RX ADMIN — ONDANSETRON 4 MG: 2 INJECTION INTRAMUSCULAR; INTRAVENOUS at 05:24

## 2023-04-23 RX ADMIN — SODIUM CHLORIDE 75 ML/HR: 0.9 INJECTION, SOLUTION INTRAVENOUS at 08:05

## 2023-04-23 RX ADMIN — IOHEXOL 100 ML: 350 INJECTION, SOLUTION INTRAVENOUS at 04:06

## 2023-04-23 RX ADMIN — OLANZAPINE 5 MG: 10 INJECTION, POWDER, FOR SOLUTION INTRAMUSCULAR at 07:53

## 2023-04-23 RX ADMIN — WATER 10 ML: 1 INJECTION INTRAMUSCULAR; INTRAVENOUS; SUBCUTANEOUS at 07:53

## 2023-04-23 RX ADMIN — ENOXAPARIN SODIUM 40 MG: 40 INJECTION SUBCUTANEOUS at 18:13

## 2023-04-23 RX ADMIN — CLONAZEPAM 0.5 MG: 0.5 TABLET ORAL at 18:13

## 2023-04-23 RX ADMIN — TRAZODONE HYDROCHLORIDE 50 MG: 50 TABLET ORAL at 22:07

## 2023-04-23 NOTE — CONSULTS
Consultation - General Surgery   Shania Carson 80 y o  female MRN: [id number]  Unit/Bed#: 405-01 Encounter: [id number]    Assessment/Plan     Assessment:  80-year-old female with known hypertension, GERD, anxiety, constipation, dementia currently admitted with abdominal pain nausea vomiting  CT scan showing likely ileus with no transition point and distended stomach and small intestine, and likely aspiration pneumonia  Chest x-ray showing some patchy opacities in the lung but similar appearance when compared to January 2021  Currently being treated with empiric antibiotics by medicine  On exam abdomen is soft minimally stented, mild tenderness and tympanitic  Plan:    CT scan showing distended loops of bowel and stomach  Stomach is very distended  Findings consistent with ileus as there is no transition point  Exam is less impressive with minimal distention and some tympany  However given the size of the gastric distention and risk for emesis and aspiration I would recommend NG tube decompression  However as per the medical attending attestation note, the POA wants to hold off on NG tube decompression  For that I would make sure the keep the head of bed elevated to prevent aspiration if emesis occurs  Furthermore I would recommend electrolyte replacement to keep potassium at 4, magnesium of 2, phosphorus at 3  Unclear etiology of this ileus as it could be infectious given her history of diarrhea, versus secondary to underlying pneumonia  Other additional acute infectious process such as UTI could also potentially cause an ileus  Would recommend checking urinalysis  For now  keep n p o  and continue IV fluids  General surgery continue to follow       Remainder care as per primary medical team       History of Present Illness     HPI:  Shania Carson is a 80 y o  female with known hypertension, GERD, anxiety, constipation, dementia, who presented to ER with 1 to 2 days of abdominal pain associated "with multiple episodes of nonbloody nonbilious emesis  Patient currently lives in [facility]  There is obvious dementia  The HPI is taken from previous records during this admission in addition to a conversation with the patient  As per the patient mild abdominal pain as of right now  Denies nausea vomiting  No fevers or chills continues to ask for water  Consults    Review of Systems     Review of systems completed, all negative signs of HPI      Historical Information   Past Medical History:   Diagnosis Date    Dysphagia     GERD (gastroesophageal reflux disease)      Past Surgical History:   Procedure Laterality Date    [facility]ACT EXTRACTION [facility]ilateral     [facility]    CHOLECYSTECTOMY  2015    EGD      x2 and was dilated    HYSTERECTOMY       Social History   Social History     Substance and Sexual Activity   Alcohol Use Not Currently     Social History     Substance and Sexual Activity   Drug Use Never     Social History     Tobacco Use   Smoking Status Never   Smokeless Tobacco Never     Family History: non-contributory    Meds/Allergies   all current active meds have been reviewed  No Known Allergies    Objective   First Vitals:   Blood Pressure: 164/76 (04/23/23 0244)  Pulse: 79 (04/23/23 0244)  Temperature: (!) 97 °F (36 1 °C) (04/23/23 0244)  Temp Source: Temporal (04/23/23 0244)  Respirations: 18 (04/23/23 0244)  Height: 5' 4\" (162 6 cm) (04/23/23 0330)  Weight - Scale: 47 4 kg (104 lb 8 oz) (04/23/23 0330)  SpO2: 96 % (04/23/23 0244)    Current Vitals:   Blood Pressure: 120/74 (04/23/23 0625)  Pulse: 75 (04/23/23 0625)  Temperature: 98 6 °F (37 °C) (04/23/23 0625)  Temp Source: Oral (04/23/23 0538)  Respirations: 18 (04/23/23 0515)  Height: 5' 4\" (162 6 cm) (04/23/23 0330)  Weight - Scale: 47 4 kg (104 lb 8 oz) (04/23/23 0330)  SpO2: 93 % (04/23/23 0715)      Intake/Output Summary (Last 24 hours) at 4/23/2023 1042  Last data filed at 4/23/2023 0751  Gross per 24 hour   Intake 0 ml " Output 300 ml   Net -300 ml       Invasive Devices     Peripheral Intravenous Line  Duration           Peripheral IV 04/23/23 Dorsal (posterior); Left Hand <1 day                Physical Exam  Vitals reviewed  Constitutional:       General: She is not in acute distress  Appearance: Normal appearance  She is normal weight  She is not ill-appearing, toxic-appearing or diaphoretic  HENT:      Head: Normocephalic and atraumatic  Right Ear: External ear normal       Left Ear: External ear normal    Eyes:      General: No scleral icterus  Right eye: No discharge  Left eye: No discharge  Cardiovascular:      Rate and Rhythm: Normal rate and regular rhythm  Heart sounds:     Friction rub present  No gallop  Pulmonary:      Effort: Pulmonary effort is normal  No respiratory distress  Breath sounds: Normal breath sounds  No stridor  Comments: Decrease BS at bases  Abdominal:      General: There is distension (mild)  Palpations: Abdomen is soft  There is no mass  Tenderness: There is abdominal tenderness (mild periumbilical, hypogastric region)  There is no guarding or rebound  Musculoskeletal:         General: No swelling  Cervical back: Normal range of motion  Right lower leg: No edema  Left lower leg: No edema  Skin:     General: Skin is warm  Coloration: Skin is not jaundiced or pale  Findings: No bruising or erythema  Neurological:      General: No focal deficit present  Mental Status: She is alert  Mental status is at baseline  Psychiatric:         Mood and Affect: Mood normal          Behavior: Behavior normal          Thought Content: Thought content normal          Lab Results:   I have personally reviewed pertinent lab results    , CBC:   Lab Results   Component Value Date    WBC 10 12 04/23/2023    HGB 13 2 04/23/2023    HCT 39 9 04/23/2023    MCV 98 04/23/2023     04/23/2023    MCH 32 4 04/23/2023    MCHC 33 1 04/23/2023    RDW 14 5 04/23/2023    MPV 10 2 04/23/2023    NRBC 0 04/23/2023   , CMP:   Lab Results   Component Value Date    SODIUM 142 04/23/2023    K 3 7 04/23/2023     04/23/2023    CO2 32 04/23/2023    BUN 10 04/23/2023    CREATININE 0 51 (L) 04/23/2023    CALCIUM 9 5 04/23/2023    AST 19 04/23/2023    ALT 14 04/23/2023    ALKPHOS 89 04/23/2023    EGFR 85 04/23/2023   , Coagulation: No results found for: PT, INR, APTT, Urinalysis: No results found for: [place], SPECGRAV, PHUR, LEUKOCYTESUR, NITRITE, PROTEINUA, GLUCOSEU, KETONESU, BILIRUBINUR, BLOODU, Amylase: No results found for: AMYLASE, Lipase:   Lab Results   Component Value Date    LIPASE 33 04/23/2023     Imaging: I have personally reviewed pertinent films in PACS  EKG, Pathology, and Other Studies: I have personally reviewed pertinent reports

## 2023-04-23 NOTE — H&P
[address]  H&P  Name: Ale Man 80 y o  female I MRN: [id number]  Unit/Bed#: 405-01 I Date of Admission: 4/23/2023   Date of Service: 4/23/2023 I Hospital Day: 0      Assessment/Plan   * Ileus [facility])  Assessment & Plan  Pt is presenting with generalized abdominal tenderness without rebound or guarding in setting of loose stools and hx of constipation  · CT abd pelvis showed fluid filled stool but no transition point which is consistent with ileus   · Etiology undetermined as no recent abd surgery (hx of cholecystectomy), No electrolyte abnormalities (magnesium and phosphorous pending), no infections (? Pneumonia - see plan for CT abnormality below)  · Zoloft could be contributing to constipation   · No concern for SBO at this time as bowel sounds are normal and pt is passing stool and flatus   · Will place pt on NPO, meds ok as symptoms are not severe  · No NG tube at this time as pt is hemodynamically stable without signs of peritonitis or severe symptoms  · IM tigan for nausea   Zofran contraindicated as QTc 477-501msec  · IV NS 75cc/hr  · Monitor for signs of peritonitis, progression to SBO  · Consider surgery consult if not improving as expected    Primary hypertension  Assessment & Plan  Bp 164/76 on arrival  Asymptomatic  Continue home amlodipine 5mg daily    Abnormal CT of the abdomen  Assessment & Plan  CT abd pelvis showed groundglass opacitieswithin the middle and right lower lobes, no airspace infiltrate  · CXR done, wet read notable for inconsistent with CT finding but on comparison to CXR from 2021, this is unchanged - final read pending   · Unlikely pneumonia as pt has no respiratory symptoms, is afebrile   · Received IV rocephin 1g x 1 in ED  · No indication for further antibiotics at this time  · procalcitonin pending     Constipated  Assessment & Plan  See plan under ileus  Continue home laxative regimen of miralax daily    Anxiety  Assessment & Plan  Continue klonopin 0 5mg BID, zoloft 25mg daily? Dementia ([facility] [address] )  Assessment & Plan  Alert and oriented x1  Continue trazodone 50mg QHS    GERD (gastroesophageal reflux disease)  Assessment & Plan  Continue protonix 40mg daily (hospital formulary for prilosec 20mg daily)       VTE Pharmacologic Prophylaxis: VTE Score: 3 Moderate Risk (Score 3-4) - Pharmacological DVT Prophylaxis Ordered: enoxaparin (Lovenox)  Code Status: Level 4 - Comfort Care   Discussed care plan and code status with Georgina Brody (niece) who is POA  Anticipated Length of Stay: Patient will be admitted on an inpatient basis with an anticipated length of stay of greater than 2 midnights secondary to ileus  Total Time Spent on Date of Encounter in care of patient: 85 minutes This time was spent on one or more of the following: performing physical exam; counseling and coordination of care; obtaining or reviewing history; documenting in the medical record; reviewing/ordering tests, medications or procedures; communicating with other healthcare professionals and discussing with patient's family/caregivers  Chief Complaint: abdominal pain     History of Present Illness: Aishwarya Romero is a 80 y o  female with a PMH of HTN, GERD, anxiety, constipation, dementia who presents with abdominal pain for 1-2 days in [place] along with 2-3 episodes of nonbloody nonbilious emesis  She also reports loose stool and is passing flatus  Pt [facility]  Denies chest pain, dyspnea, headaches, fever, chills  Review of Systems:  Review of Systems   Constitutional: Negative for chills and fever  Respiratory: Negative for chest tightness and shortness of breath  Cardiovascular: Negative for chest pain and palpitations  Gastrointestinal: Positive for abdominal pain, diarrhea, nausea and vomiting  Genitourinary: Negative for dysuria  Skin: Negative for rash  Neurological: Negative for light-headedness and headaches     Psychiatric/Behavioral: Negative for "dysphoric mood and suicidal ideas  The patient is not nervous/anxious  Past Medical and Surgical History:   Past Medical History:   Diagnosis Date    Dysphagia     GERD (gastroesophageal reflux disease)        Past Surgical History:   Procedure Laterality Date    CATARACT EXTRACTION Bilateral     [place]    CHOLECYSTECTOMY  2015    EGD      x2 and was dilated    HYSTERECTOMY         Meds/Allergies:  Prior to Admission medications    Medication Sig Start Date End Date Taking? Authorizing Provider   amLODIPine (NORVASC) 5 mg tablet Take 1 tablet (5 mg total) by mouth daily 1/23/21 2/22/21  Tommy England MD   Blood Pressure Monitoring (Blood Pressure Kit) KAREN Use 1 Device 2 (two) times a day 1/22/21   oTmmy England MD   Multiple Vitamins-Minerals (PRESERVISION AREDS PO) Take 1 caplet by mouth 2 (two) times a day    Historical Provider, MD   omeprazole (PriLOSEC) 20 mg delayed release capsule Take 20 mg by mouth every morning    Historical Provider, MD     I have reveiwed home medications using records provided by [facility]  Allergies: No Known Allergies    Social History:  Marital Status: /Civil Union   Patient Pre-hospital Living Situation: Assisted Living    Substance Use History:   Social History     Substance and Sexual Activity   Alcohol Use Not Currently     Social History     Tobacco Use   Smoking Status Never   Smokeless Tobacco Never     Social History     Substance and Sexual Activity   Drug Use Never       Family History:  Family History   Family history unknown: Yes       Physical Exam:     Vitals:   Blood Pressure: 120/74 (04/23/23 0625)  Pulse: 75 (04/23/23 0625)  Temperature: 98 6 °F (37 °C) (04/23/23 0625)  Temp Source: Oral (04/23/23 0538)  Respirations: 18 (04/23/23 0515)  Height: 5' 4\" (162 6 cm) (04/23/23 0330)  Weight - Scale: 47 4 kg (104 lb 8 oz) (04/23/23 0330)  SpO2: 93 % (04/23/23 0625)    Physical Exam  Constitutional:       Appearance: Normal appearance   " HENT:      Head: Normocephalic and atraumatic  Cardiovascular:      Rate and Rhythm: Normal rate and regular rhythm  Pulses: Normal pulses  Heart sounds: No murmur heard  Pulmonary:      Effort: Pulmonary effort is normal  No respiratory distress  Breath sounds: Normal breath sounds  No wheezing  Abdominal:      General: Bowel sounds are normal  There is no distension  Palpations: Abdomen is soft  Tenderness: There is abdominal tenderness (diffuse)  There is no guarding or rebound  Musculoskeletal:      Right lower leg: No edema  Left lower leg: No edema  Skin:     General: Skin is warm and dry  Capillary Refill: Capillary refill takes less than 2 seconds  Neurological:      General: No focal deficit present  Mental Status: She is alert  Mental status is at baseline  She is disoriented  Psychiatric:         Mood and Affect: Mood normal          Behavior: Behavior normal        Additional Data:     Lab Results:  Results from last 7 days   Lab Units 04/23/23  0257   WBC Thousand/uL 10 12   HEMOGLOBIN g/dL 13 2   HEMATOCRIT % 39 9   PLATELETS Thousands/uL 261   NEUTROS PCT % 89*   LYMPHS PCT % 4*   MONOS PCT % 6   EOS PCT % 1     Results from last 7 days   Lab Units 04/23/23  0257   SODIUM mmol/L 142   POTASSIUM mmol/L 3 7   CHLORIDE mmol/L 103   CO2 mmol/L 32   BUN mg/dL 10   CREATININE mg/dL 0 51*   ANION GAP mmol/L 7   CALCIUM mg/dL 9 5   ALBUMIN g/dL 4 0   TOTAL BILIRUBIN mg/dL 0 42   ALK PHOS U/L 89   ALT U/L 14   AST U/L 19   GLUCOSE RANDOM mg/dL 119                       Lines/Drains:  Invasive Devices     Peripheral Intravenous Line  Duration           Peripheral IV 04/23/23 Dorsal (posterior); Left Hand <1 day                    Imaging: Reviewed radiology reports from this admission including: abdominal/pelvic CT and Personally reviewed the following imaging: chest xray  CT abdomen pelvis with contrast   Final Result by Omer Castro MD (04/23 0186) Addendum (preliminary) 1 of 1 by Sunitha Buckley MD (04/23 5242)   ADDENDUM:      This addendum corrects a dictation air in the initial report, impression 1    should read:   Distended loops of small bowel without transition point, compatible with    ILEUS      Final      1  Distended loops of small bowel without transition point, compatible with cellulitis   2  Nonobstructing right renal stone   3  Findings of developing right-sided pneumonia            Workstation performed: [id number]         XR chest 1 view portable    (Results Pending)       EKG and Other Studies Reviewed on Admission:   · EKG: NSR  HR 76     ** Please Note: This note has been constructed using a voice recognition system   **

## 2023-04-23 NOTE — PLAN OF CARE
Problem: PAIN - ADULT  Goal: Verbalizes/displays adequate comfort level or baseline comfort level  Description: Interventions:  - Encourage patient to monitor pain and request assistance  - Assess pain using appropriate pain scale  - Administer analgesics based on type and severity of pain and evaluate response  - Implement non-pharmacological measures as appropriate and evaluate response  - Consider cultural and social influences on pain and pain management  - Notify physician/advanced practitioner if interventions unsuccessful or patient reports new pain  Outcome: Progressing     Problem: INFECTION - ADULT  Goal: Absence or prevention of progression during hospitalization  Description: INTERVENTIONS:  - Assess and monitor for signs and symptoms of infection  - Monitor lab/diagnostic results  - Monitor all insertion sites, i e  indwelling lines, tubes, and drains  - Monitor endotracheal if appropriate and nasal secretions for changes in amount and color  - [place] appropriate cooling/warming therapies per order  - Administer medications as ordered  - Instruct and encourage patient and family to use good hand hygiene technique  - Identify and instruct in appropriate isolation precautions for identified infection/condition  Outcome: Progressing  Goal: Absence of fever/infection during neutropenic period  Description: INTERVENTIONS:  - Monitor WBC    Outcome: Progressing     Problem: SAFETY ADULT  Goal: Patient will remain free of falls  Description: INTERVENTIONS:  - Educate patient/family on patient safety including physical limitations  - Instruct patient to call for assistance with activity   - Consult OT/PT to assist with strengthening/mobility   - Keep Call bell within reach  - Keep bed low and locked with side rails adjusted as appropriate  - Keep care items and personal belongings within reach  - Initiate and maintain comfort rounds  - Make Fall Risk Sign visible to staff  - Offer Toileting every 2 Hours, in advance of need  - Initiate/Maintain bed/chair alarm  - Obtain necessary fall risk management equipment: bed/chair alarm, nonskid socks   - Apply yellow socks and bracelet for high fall risk patients  - Consider moving patient to room near nurses station  Outcome: Progressing  Goal: Maintain or return to baseline ADL function  Description: INTERVENTIONS:  -  Assess patient's ability to carry out ADLs; assess patient's baseline for ADL function and identify physical deficits which impact ability to perform ADLs (bathing, care of mouth/teeth, toileting, grooming, dressing, etc )  - Assess/evaluate cause of self-care deficits   - Assess range of motion  - Assess patient's mobility; develop plan if impaired  - Assess patient's need for assistive devices and provide as appropriate  - Encourage maximum independence but intervene and supervise when necessary  - Involve family in performance of ADLs  - Assess for home care needs following discharge   - Consider OT consult to assist with ADL evaluation and planning for discharge  - Provide patient education as appropriate  Outcome: Progressing  Goal: Maintains/Returns to pre admission functional level  Description: INTERVENTIONS:  - Perform BMAT or MOVE assessment daily    - Set and communicate daily mobility goal to care team and patient/family/caregiver  - Collaborate with rehabilitation services on mobility goals if consulted  - Perform Range of Motion 3 times a day  - Reposition patient every 3 hours    - Dangle patient 3 times a day  - Stand patient 3 times a day  - Ambulate patient 3 times a day  - Out of bed to chair 3 times a day   - Out of bed for meals 3 times a day  - Out of bed for toileting  - Record patient progress and toleration of activity level   Outcome: Progressing     Problem: DISCHARGE PLANNING  Goal: Discharge to home or other facility with appropriate resources  Description: INTERVENTIONS:  - Identify barriers to discharge w/patient and caregiver  - Arrange for needed discharge resources and transportation as appropriate  - Identify discharge learning needs (meds, wound care, etc )  - Arrange for interpretive services to assist at discharge as needed  - Refer to Case Management Department for coordinating discharge planning if the patient needs post-hospital services based on physician/advanced practitioner order or complex needs related to functional status, cognitive ability, or social support system  Outcome: Progressing     Problem: Knowledge Deficit  Goal: Patient/family/caregiver demonstrates understanding of disease process, treatment plan, medications, and discharge instructions  Description: Complete learning assessment and assess knowledge base    Interventions:  - Provide teaching at level of understanding  - Provide teaching via preferred learning methods  Outcome: Progressing

## 2023-04-23 NOTE — ASSESSMENT & PLAN NOTE
CT abd pelvis showed groundglass opacitieswithin the middle and right lower lobes, no airspace infiltrate  · CXR done, wet read notable for inconsistent with CT finding but on comparison to CXR from 2021, this is unchanged - final read pending   · Unlikely pneumonia as pt has no respiratory symptoms, is afebrile   · Received IV rocephin 1g x 1 in ED  · No indication for further antibiotics at this time  · procalcitonin pending

## 2023-04-23 NOTE — ASSESSMENT & PLAN NOTE
Pt is presenting with generalized abdominal tenderness without rebound or guarding in setting of loose stools and hx of constipation  · CT abd pelvis showed fluid filled stool but no transition point which is consistent with ileus   · Etiology undetermined as no recent abd surgery (hx of cholecystectomy), No electrolyte abnormalities (magnesium and phosphorous pending), no infections (? Pneumonia - see plan for CT abnormality below)  · Zoloft could be contributing to constipation   · No concern for SBO at this time as bowel sounds are normal and pt is passing stool and flatus   · Will place pt on NPO, meds ok as symptoms are not severe  · No NG tube at this time as pt is hemodynamically stable without signs of peritonitis or severe symptoms  · IM tigan for nausea   Zofran contraindicated as QTc 477-501msec  · IV NS 75cc/hr  · Monitor for signs of peritonitis, progression to SBO  · Consider surgery consult if not improving as expected

## 2023-04-23 NOTE — ED PROVIDER NOTES
History  Chief Complaint   Patient presents with    Abdominal Pain     Patient states that she has been having some LLQ   abdominal pain and it hurts when you press on it  77-year-old female with a past medical history of cholecystectomy, who presents for abdominal pain  Patient has dementia, is currently AAO x1, however pleasant unable to provide some relevant history  She describes that she has left lower quadrant pain that hurts when you press  She states that she has had an episode or 2 of vomiting over the past day, which was corroborated by EMS  Patient states that her stools have been runny recently  She denies fevers or chills, no chest pain or shortness of breath  No dysuria or frequency  Review of system otherwise negative          Prior to Admission Medications   Prescriptions Last Dose Informant Patient Reported? Taking?    Blood Pressure Monitoring (Blood Pressure Kit) KAREN Unknown  No No   Sig: Use 1 Device 2 (two) times a day   Patient not taking: Reported on 2023   Multiple Vitamins-Minerals (ICAPS AREDS FORMULA PO) 2023  Yes Yes   Si (two) times a day   Multiple Vitamins-Minerals (PRESERVISION AREDS PO) 2023  Yes Yes   Sig: Take 1 caplet by mouth 2 (two) times a day   amLODIPine (NORVASC) 5 mg tablet 2023  No Yes   Sig: Take 1 tablet (5 mg total) by mouth daily   clonazePAM (KlonoPIN) 0 5 mg tablet 2023  Yes Yes   Si 5 mg 2 (two) times a day   hydrocortisone 1 % cream 2023  Yes Yes   Sig: Apply topically 3 (three) times a day Rash on chest   omeprazole (PriLOSEC) 20 mg delayed release capsule 2023  Yes Yes   Sig: Take 20 mg by mouth every morning   sertraline (ZOLOFT) 25 mg tablet 2023  Yes Yes   Sig: Take 25 mg by mouth daily in the early morning   traZODone (DESYREL) 50 mg tablet 2023  Yes Yes   Si mg daily at bedtime      Facility-Administered Medications: None       Past Medical History:   Diagnosis Date    Dysphagia     GERD (gastroesophageal reflux disease)        Past Surgical History:   Procedure Laterality Date    CATARACT EXTRACTION Bilateral     [facility]    CHOLECYSTECTOMY  2015    EGD      x2 and was dilated    HYSTERECTOMY         Family History   Family history unknown: Yes     I have reviewed and agree with the history as documented  E-Cigarette/Vaping    E-Cigarette Use Never User      E-Cigarette/Vaping Substances     Social History     Tobacco Use    Smoking status: Never    Smokeless tobacco: Never   Vaping Use    Vaping Use: Never used   Substance Use Topics    Alcohol use: Not Currently    Drug use: Never       Review of Systems   Constitutional: Negative for chills and fever  HENT: Negative for congestion, rhinorrhea and sore throat  Respiratory: Negative for cough and shortness of breath  Cardiovascular: Negative for chest pain and palpitations  Gastrointestinal: Positive for abdominal pain, nausea and vomiting  Negative for constipation and diarrhea  Genitourinary: Negative for difficulty urinating and flank pain  Musculoskeletal: Negative for arthralgias  Neurological: Negative for dizziness, weakness, light-headedness and headaches  Psychiatric/Behavioral: Negative for agitation, behavioral problems and confusion  All other systems reviewed and are negative  Physical Exam  Physical Exam  Constitutional:       Appearance: She is well-developed  Comments: Well-appearing in no distress   HENT:      Head: Normocephalic and atraumatic  Cardiovascular:      Rate and Rhythm: Normal rate and regular rhythm  Heart sounds: Normal heart sounds  No murmur heard  No friction rub  Pulmonary:      Effort: Pulmonary effort is normal  No respiratory distress  Breath sounds: Normal breath sounds  No wheezing or rales  Abdominal:      General: Bowel sounds are normal  There is no distension  Palpations: Abdomen is soft  Tenderness:  There is generalized abdominal tenderness  Comments: Patient initially only described LLQ tenderness, however, on repeat exam, she describes tenderness throughout abdomen  However, belly is soft, no guarding/rigidity  Musculoskeletal:         General: Normal range of motion  Cervical back: Normal range of motion and neck supple  Skin:     General: Skin is warm  Neurological:      Mental Status: She is alert and oriented to person, place, and time  Coordination: Coordination normal    Psychiatric:         Behavior: Behavior normal          Thought Content:  Thought content normal          Judgment: Judgment normal          Vital Signs  ED Triage Vitals   Temperature Pulse Respirations Blood Pressure SpO2   04/23/23 0244 04/23/23 0244 04/23/23 0244 04/23/23 0244 04/23/23 0244   (!) 97 °F (36 1 °C) 79 18 164/76 96 %      Temp Source Heart Rate Source Patient Position - Orthostatic VS BP Location FiO2 (%)   04/23/23 0244 04/23/23 0244 04/23/23 0244 04/23/23 0244 --   Temporal Monitor Lying Right arm       Pain Score       04/23/23 0648       No Pain           Vitals:    04/23/23 0330 04/23/23 0430 04/23/23 0515 04/23/23 0625   BP: 154/69 155/67 164/70 120/74   Pulse: 83 79 89 75   Patient Position - Orthostatic VS:             Visual Acuity      ED Medications  Medications   clonazePAM (KlonoPIN) tablet 0 5 mg (has no administration in time range)   traZODone (DESYREL) tablet 50 mg (has no administration in time range)   sertraline (ZOLOFT) tablet 25 mg (has no administration in time range)   hydrocortisone 1 % cream (has no administration in time range)   pantoprazole (PROTONIX) EC tablet 40 mg (has no administration in time range)   amLODIPine (NORVASC) tablet 5 mg (has no administration in time range)   polyethylene glycol (MIRALAX) packet 17 g (has no administration in time range)   sodium chloride 0 9 % infusion (has no administration in time range)   trimethobenzamide (TIGAN) IM injection 200 mg (has no administration in time range)   acetaminophen (TYLENOL) tablet 650 mg (has no administration in time range)   enoxaparin (LOVENOX) subcutaneous injection 40 mg (has no administration in time range)   iohexol (OMNIPAQUE) 350 MG/ML injection (SINGLE-DOSE) 100 mL (100 mL Intravenous Given 4/23/23 0406)   ondansetron (ZOFRAN) injection 4 mg (4 mg Intravenous Given 4/23/23 0524)   cefTRIAXone (ROCEPHIN) IVPB (premix in dextrose) 1,000 mg 50 mL (1,000 mg Intravenous New Bag 4/23/23 0541)       Diagnostic Studies  Results Reviewed     Procedure Component Value Units Date/Time    Magnesium [[id number]] Collected: 04/23/23 0257    Lab Status: In process Specimen: Blood Updated: 04/23/23 0659    Phosphorus [[id number]] Collected: 04/23/23 0257    Lab Status: In process Specimen: Blood Updated: 04/23/23 0659    Procalcitonin [[id number]]  (Normal) Collected: 04/23/23 0257    Lab Status: Final result Specimen: Blood Updated: 04/23/23 0641     Procalcitonin 0 05 ng/ml     Blood culture [[id number]] Collected: 04/23/23 0540    Lab Status: In process Specimen: Blood from Arm, Left Updated: 04/23/23 0602    Blood culture [[id number]] Collected: 04/23/23 0540    Lab Status:  In process Specimen: Blood from Arm, Right Updated: 04/23/23 0602    HS Troponin 0hr (reflex protocol) [[id number]]  (Normal) Collected: 04/23/23 0257    Lab Status: Final result Specimen: Blood from Arm, Left Updated: 04/23/23 0336     hs TnI 0hr 4 ng/L     Lipase [[id number]]  (Normal) Collected: 04/23/23 0257    Lab Status: Final result Specimen: Blood from Arm, Left Updated: 04/23/23 0331     Lipase 33 u/L     Comprehensive metabolic panel [[id number]]  (Abnormal) Collected: 04/23/23 0257    Lab Status: Final result Specimen: Blood from Arm, Left Updated: 04/23/23 0331     Sodium 142 mmol/L      Potassium 3 7 mmol/L      Chloride 103 mmol/L      CO2 32 mmol/L      ANION GAP 7 mmol/L      BUN 10 mg/dL      Creatinine 0 51 mg/dL      Glucose 119 mg/dL      Calcium 9 5 mg/dL      AST 19 U/L      ALT 14 U/L      Alkaline Phosphatase 89 U/L      Total Protein 7 0 g/dL      Albumin 4 0 g/dL      Total Bilirubin 0 42 mg/dL      eGFR 85 ml/min/1 73sq m     Narrative:      National Kid[employer] Disease Foundation guidelines for Chronic Kidney Disease (CKD):     Stage 1 with normal or high GFR (GFR > 90 mL/min/1 73 square meters)    Stage 2 Mild CKD (GFR = 60-89 mL/min/1 73 square meters)    Stage 3A Moderate CKD (GFR = 45-59 mL/min/1 73 square meters)    Stage 3B Moderate CKD (GFR = 30-44 mL/min/1 73 square meters)    Stage 4 Severe CKD (GFR = 15-29 mL/min/1 73 square meters)    Stage 5 End Stage CKD (GFR <15 mL/min/1 73 square meters)  Note: GFR calculation is accurate only with a steady state creatinine    CBC and differential [[id number]]  (Abnormal) Collected: 04/23/23 0257    Lab Status: Final result Specimen: Blood from Arm, Left Updated: 04/23/23 0313     WBC 10 12 Thousand/uL      RBC 4 08 Million/uL      Hemoglobin 13 2 g/dL      Hematocrit 39 9 %      MCV 98 fL      MCH 32 4 pg      MCHC 33 1 g/dL      RDW 14 5 %      MPV 10 2 fL      Platelets 830 Thousands/uL      nRBC 0 /100 WBCs      Neutrophils Relative 89 %      Immat GRANS % 0 %      Lymphocytes Relative 4 %      Monocytes Relative 6 %      Eosinophils Relative 1 %      Basophils Relative 0 %      Neutrophils Absolute 9 00 Thousands/µL      Immature Grans Absolute 0 03 Thousand/uL      Lymphocytes Absolute 0 42 Thousands/µL      Monocytes Absolute 0 59 Thousand/µL      Eosinophils Absolute 0 07 Thousand/µL      Basophils Absolute 0 01 Thousands/µL                  CT abdomen pelvis with contrast   Final Result by Karen Barber MD (04/23 5248)   Addendum (preliminary) 1 of 1 by Karen Barber MD (04/23 3115)   ADDENDUM:      This addendum corrects a dictation air in the initial report, impression 1    should read:   Distended loops of small bowel without transition point, compatible with    ILEUS      Final      1  Distended loops of small bowel without transition point, compatible with cellulitis   2  Nonobstructing right renal stone   3  Findings of developing right-sided pneumonia            Workstation performed: [id number]         XR chest 1 view portable    (Results Pending)              Procedures  Procedures         ED Course                               SBIRT 22yo+    Flowsheet Row Most Recent Value   Initial Alcohol Screen: US AUDIT-C     1  How often do you have a drink containing alcohol? 0 Filed at: 04/23/2023 0243   2  How many drinks containing alcohol do you have on a typical day you are drinking? 0 Filed at: 04/23/2023 0243   3a  Male UNDER 65: How often do you have five or more drinks on one occasion? 0 Filed at: 04/23/2023 0243   3b  FEMALE Any Age, or MALE 65+: How often do you have 4 or more drinks on one occassion? 0 Filed at: 04/23/2023 0243   Audit-C Score 0 Filed at: 04/23/2023 5182   TING: How many times in the past year have you    Used an illegal drug or used a prescription medication for non-medical reasons? Never Filed at: 04/23/2023 0243                    Medical Decision Making  I reviewed the patient's medical chart, PMHx, prior encounters, medications  My independent interpretation of ECG demonstrated:    My DDx includes: Diverticulitis, UTI, bowel obstruction, kidney stone    Will obtain labs, UA, CT scan  Will re-assess sx and review  CT scan showed ileus and possible R-sided pneumonia  I discussed POLST with Leticia Daily  She reports that patient would be comfortable with antibiotics as well as NGT if it was needed temporarily  Discussed case with SLIM who was okay with holding off NGT at this time, will make NPO  Patient admitted to [facility] service  Amount and/or Complexity of Data Reviewed  Labs: ordered  Radiology: ordered  Risk  Prescription drug management  Decision regarding hospitalization            Disposition  Final diagnoses:   Abdominal pain   Ileus ([address] ) Nausea and vomiting   Pneumonia     Time reflects when diagnosis was documented in both MDM as applicable and the Disposition within this note     Time User Action Codes Description Comment    4/23/2023  5:31 AM Destinee Brenda Add [R10 9] Abdominal pain     4/23/2023  5:31 AM Senait Broderick Add [K56 7] Ileus ([address] )     4/23/2023  5:31 AM Senait Broderick Add [R11 2] Nausea and vomiting     4/23/2023  7:13 AM Destinee Gutierrez Add [J18 9] Pneumonia       ED Disposition     ED Disposition   Admit    Condition   Stable    Date/Time   Sun Apr 23, 2023  5:31 AM    Comment   Case was discussed with KAROL and the patient's admission status was agreed to be Admission Status: inpatient status to the service of Dr Tran Maguire   Follow-up Information    None         Current Discharge Medication List      CONTINUE these medications which have NOT CHANGED    Details   amLODIPine (NORVASC) 5 mg tablet Take 1 tablet (5 mg total) by mouth daily  Qty: 30 tablet, Refills: 0    Associated Diagnoses: Hypertension, unspecified type      clonazePAM (KlonoPIN) 0 5 mg tablet 0 5 mg 2 (two) times a day      hydrocortisone 1 % cream Apply topically 3 (three) times a day Rash on chest      !! Multiple Vitamins-Minerals (ICAPS AREDS FORMULA PO) 2 (two) times a day      !! Multiple Vitamins-Minerals (PRESERVISION AREDS PO) Take 1 caplet by mouth 2 (two) times a day      omeprazole (PriLOSEC) 20 mg delayed release capsule Take 20 mg by mouth every morning      sertraline (ZOLOFT) 25 mg tablet Take 25 mg by mouth daily in the early morning      traZODone (DESYREL) 50 mg tablet 50 mg daily at bedtime      Blood Pressure Monitoring (Blood Pressure Kit) KAREN Use 1 Device 2 (two) times a day  Qty: 1 Device, Refills: 0    Associated Diagnoses: Hypertension, unspecified type       !! - Potential duplicate medications found  Please discuss with provider  No discharge procedures on file      PDMP Review     None ED Provider  Electronically Signed by           Tesfaye Joel MD  04/23/23 Violeta Prather MD  04/23/23 9065

## 2023-04-24 PROBLEM — J18.9 PNEUMONIA DUE TO INFECTIOUS ORGANISM: Status: ACTIVE | Noted: 2023-04-24

## 2023-04-24 LAB
ALBUMIN SERPL BCP-MCNC: 3.6 G/DL (ref 3.5–5)
ALP SERPL-CCNC: 83 U/L (ref 34–104)
ALT SERPL W P-5'-P-CCNC: 16 U/L (ref 7–52)
ANION GAP SERPL CALCULATED.3IONS-SCNC: 8 MMOL/L (ref 4–13)
ANISOCYTOSIS BLD QL SMEAR: PRESENT
AST SERPL W P-5'-P-CCNC: 31 U/L (ref 13–39)
BASOPHILS # BLD MANUAL: 0 THOUSAND/UL (ref 0–0.1)
BASOPHILS NFR MAR MANUAL: 0 % (ref 0–1)
BILIRUB SERPL-MCNC: 0.6 MG/DL (ref 0.2–1)
BUN SERPL-MCNC: 8 MG/DL (ref 5–25)
CALCIUM SERPL-MCNC: 9 MG/DL (ref 8.4–10.2)
CHLORIDE SERPL-SCNC: 104 MMOL/L (ref 96–108)
CO2 SERPL-SCNC: 26 MMOL/L (ref 21–32)
CREAT SERPL-MCNC: 0.43 MG/DL (ref 0.6–1.3)
EOSINOPHIL # BLD MANUAL: 0 THOUSAND/UL (ref 0–0.4)
EOSINOPHIL NFR BLD MANUAL: 0 % (ref 0–6)
ERYTHROCYTE [DISTWIDTH] IN BLOOD BY AUTOMATED COUNT: 14.4 % (ref 11.6–15.1)
GFR SERPL CREATININE-BSD FRML MDRD: 90 ML/MIN/1.73SQ M
GLUCOSE SERPL-MCNC: 86 MG/DL (ref 65–140)
HCT VFR BLD AUTO: 35.2 % (ref 34.8–46.1)
HGB BLD-MCNC: 11.7 G/DL (ref 11.5–15.4)
LYMPHOCYTES # BLD AUTO: 0.47 THOUSAND/UL (ref 0.6–4.47)
LYMPHOCYTES # BLD AUTO: 3 % (ref 14–44)
MCH RBC QN AUTO: 32.1 PG (ref 26.8–34.3)
MCHC RBC AUTO-ENTMCNC: 33.2 G/DL (ref 31.4–37.4)
MCV RBC AUTO: 96 FL (ref 82–98)
MONOCYTES # BLD AUTO: 0.95 THOUSAND/UL (ref 0–1.22)
MONOCYTES NFR BLD: 6 % (ref 4–12)
NEUTROPHILS # BLD MANUAL: 14.35 THOUSAND/UL (ref 1.85–7.62)
NEUTS BAND NFR BLD MANUAL: 4 % (ref 0–8)
NEUTS SEG NFR BLD AUTO: 87 % (ref 43–75)
OVALOCYTES BLD QL SMEAR: PRESENT
PLATELET # BLD AUTO: 198 THOUSANDS/UL (ref 149–390)
PLATELET BLD QL SMEAR: ADEQUATE
PMV BLD AUTO: 10 FL (ref 8.9–12.7)
POTASSIUM SERPL-SCNC: 3.7 MMOL/L (ref 3.5–5.3)
PROCALCITONIN SERPL-MCNC: 0.87 NG/ML
PROT SERPL-MCNC: 6.2 G/DL (ref 6.4–8.4)
RBC # BLD AUTO: 3.65 MILLION/UL (ref 3.81–5.12)
SODIUM SERPL-SCNC: 138 MMOL/L (ref 135–147)
WBC # BLD AUTO: 15.77 THOUSAND/UL (ref 4.31–10.16)

## 2023-04-24 RX ADMIN — CEFTRIAXONE 1000 MG: 1 INJECTION, SOLUTION INTRAVENOUS at 05:09

## 2023-04-24 RX ADMIN — SODIUM CHLORIDE 75 ML/HR: 0.9 INJECTION, SOLUTION INTRAVENOUS at 10:33

## 2023-04-24 RX ADMIN — ENOXAPARIN SODIUM 40 MG: 40 INJECTION SUBCUTANEOUS at 18:40

## 2023-04-24 RX ADMIN — CLONAZEPAM 0.5 MG: 0.5 TABLET ORAL at 18:41

## 2023-04-24 RX ADMIN — TRAZODONE HYDROCHLORIDE 50 MG: 50 TABLET ORAL at 22:31

## 2023-04-24 NOTE — PLAN OF CARE
Problem: PAIN - ADULT  Goal: Verbalizes/displays adequate comfort level or baseline comfort level  Description: Interventions:  - Encourage patient to monitor pain and request assistance  - Assess pain using appropriate pain scale  - Administer analgesics based on type and severity of pain and evaluate response  - Implement non-pharmacological measures as appropriate and evaluate response  - Consider cultural and social influences on pain and pain management  - Notify physician/advanced practitioner if interventions unsuccessful or patient reports new pain  Outcome: Progressing     Problem: INFECTION - ADULT  Goal: Absence or prevention of progression during hospitalization  Description: INTERVENTIONS:  - Assess and monitor for signs and symptoms of infection  - Monitor lab/diagnostic results  - Monitor all insertion sites, i e  indwelling lines, tubes, and drains  - Monitor endotracheal if appropriate and nasal secretions for changes in amount and color  - [place] appropriate cooling/warming therapies per order  - Administer medications as ordered  - Instruct and encourage patient and family to use good hand hygiene technique  - Identify and instruct in appropriate isolation precautions for identified infection/condition  Outcome: Progressing  Goal: Absence of fever/infection during neutropenic period  Description: INTERVENTIONS:  - Monitor WBC    Outcome: Progressing     Problem: SAFETY ADULT  Goal: Patient will remain free of falls  Description: INTERVENTIONS:  - Educate patient/family on patient safety including physical limitations  - Instruct patient to call for assistance with activity   - Consult OT/PT to assist with strengthening/mobility   - Keep Call bell within reach  - Keep bed low and locked with side rails adjusted as appropriate  - Keep care items and personal belongings within reach  - Initiate and maintain comfort rounds  - Make Fall Risk Sign visible to staff  - Offer Toileting every 2 Hours, in advance of need  - Initiate/Maintain bed/chair alarm  - Obtain necessary fall risk management equipment: bed/chair alarm, nonskid socks   - Apply yellow socks and bracelet for high fall risk patients  - Consider moving patient to room near nurses station  Outcome: Progressing  Goal: Maintain or return to baseline ADL function  Description: INTERVENTIONS:  -  Assess patient's ability to carry out ADLs; assess patient's baseline for ADL function and identify physical deficits which impact ability to perform ADLs (bathing, care of mouth/teeth, toileting, grooming, dressing, etc )  - Assess/evaluate cause of self-care deficits   - Assess range of motion  - Assess patient's mobility; develop plan if impaired  - Assess patient's need for assistive devices and provide as appropriate  - Encourage maximum independence but intervene and supervise when necessary  - Involve family in performance of ADLs  - Assess for home care needs following discharge   - Consider OT consult to assist with ADL evaluation and planning for discharge  - Provide patient education as appropriate  Outcome: Progressing  Goal: Maintains/Returns to pre admission functional level  Description: INTERVENTIONS:  - Perform BMAT or MOVE assessment daily    - Set and communicate daily mobility goal to care team and patient/family/caregiver  - Collaborate with rehabilitation services on mobility goals if consulted  - Perform Range of Motion 3 times a day  - Reposition patient every 3 hours    - Dangle patient 3 times a day  - Stand patient 3 times a day  - Ambulate patient 3 times a day  - Out of bed to chair 3 times a day   - Out of bed for meals 3 times a day  - Out of bed for toileting  - Record patient progress and toleration of activity level   Outcome: Progressing     Problem: DISCHARGE PLANNING  Goal: Discharge to home or other facility with appropriate resources  Description: INTERVENTIONS:  - Identify barriers to discharge w/patient and caregiver  - Arrange for needed discharge resources and transportation as appropriate  - Identify discharge learning needs (meds, wound care, etc )  - Arrange for interpretive services to assist at discharge as needed  - Refer to Case Management Department for coordinating discharge planning if the patient needs post-hospital services based on physician/advanced practitioner order or complex needs related to functional status, cognitive ability, or social support system  Outcome: Progressing     Problem: Knowledge Deficit  Goal: Patient/family/caregiver demonstrates understanding of disease process, treatment plan, medications, and discharge instructions  Description: Complete learning assessment and assess knowledge base  Interventions:  - Provide teaching at level of understanding  - Provide teaching via preferred learning methods  Outcome: Progressing     Problem: MOBILITY - ADULT  Goal: Maintain or return to baseline ADL function  Description: INTERVENTIONS:  -  Assess patient's ability to carry out ADLs; assess patient's baseline for ADL function and identify physical deficits which impact ability to perform ADLs (bathing, care of mouth/teeth, toileting, grooming, dressing, etc )  - Assess/evaluate cause of self-care deficits   - Assess range of motion  - Assess patient's mobility; develop plan if impaired  - Assess patient's need for assistive devices and provide as appropriate  - Encourage maximum independence but intervene and supervise when necessary  - Involve family in performance of ADLs  - Assess for home care needs following discharge   - Consider OT consult to assist with ADL evaluation and planning for discharge  - Provide patient education as appropriate  Outcome: Progressing  Goal: Maintains/Returns to pre admission functional level  Description: INTERVENTIONS:  - Perform BMAT or MOVE assessment daily    - Set and communicate daily mobility goal to care team and patient/family/caregiver     - Collaborate with rehabilitation services on mobility goals if consulted  - Perform Range of Motion 3 times a day  - Reposition patient every 3 hours  - Dangle patient 3 times a day  - Stand patient 3 times a day  - Ambulate patient 3 times a day  - Out of bed to chair 3 times a day   - Out of bed for meals 3 times a day  - Out of bed for toileting  - Record patient progress and toleration of activity level   Outcome: Progressing     Problem: Nutrition/Hydration-ADULT  Goal: Nutrient/Hydration intake appropriate for improving, restoring or maintaining nutritional needs  Description: Monitor and assess patient's nutrition/hydration status for malnutrition  Collaborate with interdisciplinary team and initiate plan and interventions as ordered  Monitor patient's weight and dietary intake as ordered or per policy  Utilize nutrition screening tool and intervene as necessary  Determine patient's food preferences and provide high-protein, high-caloric foods as appropriate       INTERVENTIONS:  - Monitor oral intake, urinary output, labs, and treatment plans  - Assess nutrition and hydration status and recommend course of action  - Evaluate amount of meals eaten  - Assist patient with eating if necessary   - Allow adequate time for meals  - Recommend/ encourage appropriate diets, oral nutritional supplements, and vitamin/mineral supplements  - Order, calculate, and assess calorie counts as needed  - Recommend, monitor, and adjust tube feedings and TPN/PPN based on assessed needs  - Assess need for intravenous fluids  - Provide specific nutrition/hydration education as appropriate  - Include patient/family/caregiver in decisions related to nutrition  Outcome: Progressing     Problem: Prexisting or High Potential for Compromised Skin Integrity  Goal: Skin integrity is maintained or improved  Description: INTERVENTIONS:  - Identify patients at risk for skin breakdown  - Assess and monitor skin integrity  - Assess and monitor nutrition and hydration status  - Monitor labs   - Assess for incontinence   - Turn and reposition patient  - Assist with mobility/ambulation  - Relieve pressure over bony prominences  - Avoid friction and shearing  - Provide appropriate hygiene as needed including keeping skin clean and dry  - Evaluate need for skin moisturizer/barrier cream  - Collaborate with interdisciplinary team   - Patient/family teaching  - Consider wound care consult   Outcome: Progressing

## 2023-04-24 NOTE — PROGRESS NOTES
[facility] [address]  Progress Note  Name: Neil Blanton  MRN: [id number]  Unit/Bed#: 598-54 I Date of Admission: 4/23/2023   Date of Service: 4/24/2023 I Hospital Day: 1    Assessment/Plan   * Ileus [facility])  Assessment & Plan  Pt is presenting with generalized abdominal tenderness without rebound or guarding in setting of loose stools and hx of constipation  · CT abd pelvis showed fluid filled stool but no transition point which is consistent with ileus   · Continue IV fluid, maintain n p o  status, case discussed with surgical team, family declined NG tube  Pneumonia due to infectious organism  Assessment & Plan  Continue antibiotics patient does have elevated procalcitonin as well as leukocytosis today    Repeat a m  labs, respiratory status stable  Abnormal CT of the abdomen  Assessment & Plan  · CT abd pelvis showed groundglass opacities within the middle and right lower lobes    · Possible pneumonia, leukocytosis and elevated procalcitonin        Primary hypertension  Assessment & Plan    Asymptomatic  Continue home amlodipine 5mg daily    Constipated  Assessment & Plan    Continue to monitor, hold MiraLAX in the setting of ileus    Dementia ([facility] [address] )  Assessment & Plan  Alert and oriented x1  Continue home regimen    GERD (gastroesophageal reflux disease)  Assessment & Plan  Continue protonix 40mg daily (hospital formulary for prilosec 20mg daily)    Anxiety  Assessment & Plan  Continue klonopin 0 5mg BID, zoloft 25mg daily           VTE Pharmacologic Prophylaxis: VTE Score: 3 Moderate Risk (Score 3-4) - Pharmacological DVT Prophylaxis Ordered: enoxaparin (Lovenox)  Patient Centered Rounds: I performed bedside rounds with nursing staff today  Education and Discussions with Family / Patient: Updated  () via phone      Total Time Spent on Date of Encounter in care of patient: 55 minutes This time was spent on one or more of the following: performing physical exam; counseling and coordination of care; obtaining or reviewing history; documenting in the medical record; reviewing/ordering tests, medications or procedures; communicating with other healthcare professionals and discussing with patient's family/caregivers  Current Length of Stay: 1 day(s)  Current Patient Status: Inpatient   Certification Statement: The patient will continue to require additional inpatient hospital stay due to Suspected ileus, not tolerating p o  intake currently, needs continuous IV fluid, continue monitoring, continued IV antibiotics  Discharge Plan: Anticipate discharge in 24-48 hrs to home  Code Status: Level 3 - DNAR and DNI    Subjective:   No specific pain, patient is confused at baseline    Objective:     Vitals:   Temp (24hrs), Av 1 °F (36 7 °C), Min:97 6 °F (36 4 °C), Max:98 5 °F (36 9 °C)    Temp:  [97 6 °F (36 4 °C)-98 5 °F (36 9 °C)] 97 6 °F (36 4 °C)  HR:  [91-94] 91  Resp:  [18-19] 18  BP: (118-126)/(75-77) 125/75  SpO2:  [94 %-97 %] 95 %  Body mass index is 17 94 kg/m²  Input and Output Summary (last 24 hours): Intake/Output Summary (Last 24 hours) at 2023 1114  Last data filed at 2023 0700  Gross per 24 hour   Intake 0 ml   Output --   Net 0 ml       Physical Exam:   Physical Exam  Vitals and nursing note reviewed  HENT:      Head: Normocephalic  Right Ear: External ear normal       Left Ear: External ear normal    Cardiovascular:      Rate and Rhythm: Normal rate  Pulmonary:      Effort: Pulmonary effort is normal    Abdominal:      General: There is distension  Tenderness: There is abdominal tenderness  There is no guarding or rebound  Musculoskeletal:      Cervical back: Normal range of motion  Neurological:      General: No focal deficit present  Mental Status: She is alert            Additional Data:     Labs:  Results from last 7 days   Lab Units 23  0441 23  0257   WBC Thousand/uL 15 77* 10 12   HEMOGLOBIN g/dL 11 7 13 2   HEMATOCRIT % 35 2 39 9   PLATELETS Thousands/uL 198 261   BANDS PCT % 4  --    NEUTROS PCT %  --  89*   LYMPHS PCT %  --  4*   LYMPHO PCT % 3*  --    MONOS PCT %  --  6   MONO PCT % 6  --    EOS PCT % 0 1     Results from last 7 days   Lab Units 04/24/23  0441   SODIUM mmol/L 138   POTASSIUM mmol/L 3 7   CHLORIDE mmol/L 104   CO2 mmol/L 26   BUN mg/dL 8   CREATININE mg/dL 0 43*   ANION GAP mmol/L 8   CALCIUM mg/dL 9 0   ALBUMIN g/dL 3 6   TOTAL BILIRUBIN mg/dL 0 60   ALK PHOS U/L 83   ALT U/L 16   AST U/L 31   GLUCOSE RANDOM mg/dL 86                 Results from last 7 days   Lab Units 04/24/23  0441 04/23/23  0257   PROCALCITONIN ng/ml 0 87* 0 05       Lines/Drains:  Invasive Devices     Peripheral Intravenous Line  Duration           Peripheral IV 04/23/23 Dorsal (posterior); Left Hand 1 day                      Imaging: Reviewed radiology reports from this admission including: chest CT scan    Recent Cultures (last 7 days):   Results from last 7 days   Lab Units 04/23/23  0540   BLOOD CULTURE  Received in Microbiology Lab  Culture in Progress  Received in Microbiology Lab  Culture in Progress         Last 24 Hours Medication List:   Current Facility-Administered Medications   Medication Dose Route Frequency Provider Last Rate    acetaminophen  650 mg Oral Q6H PRN Nikhil Presley DO      amLODIPine  5 mg Oral Daily Jie Richards,       cefTRIAXone  1,000 mg Intravenous Q24H Ken Harrell MD 1,000 mg (04/24/23 0509)    clonazePAM  0 5 mg Oral BID Nikhil Presley DO      enoxaparin  40 mg Subcutaneous BID Jie Richards, DO      OLANZapine  5 mg Intramuscular Q3H PRN Ken Harrell MD      sertraline  25 mg Oral QAM Nikhil Presley DO      sodium chloride  75 mL/hr Intravenous Continuous Jie Richards DO 75 mL/hr (04/24/23 1033)    traZODone  50 mg Oral HS Jie Richards, DO      trimethobenzamide  200 mg Intramuscular Q6H PRN Nikhil Presley DO          Today, Patient Was Seen By: Daniel Castro DO    **Please Note: This note may have been constructed using a voice recognition system  **

## 2023-04-24 NOTE — ASSESSMENT & PLAN NOTE
· CT abd pelvis showed groundglass opacities within the middle and right lower lobes    · Possible pneumonia, leukocytosis and elevated procalcitonin

## 2023-04-24 NOTE — CASE MANAGEMENT
Case Management Assessment    Patient name Betty Jung  Location /279-29 MRN [id number]  : 10/6/1933 Date 2023       Current Admission Date: 2023  Current Admission Diagnosis:Ileus [facility])   Patient Active Problem List    Diagnosis Date Noted    Pneumonia due to infectious organism 2023    Ileus ([facility] [address] ) 2023    Constipated 2023    Primary hypertension 2023    Abnormal CT of the abdomen 2023    Anxiety 2021    Dementia ([facility] [address] ) 2021    Osteoporosis 2016    Chest pressure 2016    GERD (gastroesophageal reflux disease) 2016    Hypertensive crisis 09/15/2016    Hyperlipidemia 2016    Cholelithiasis without obstruction 2016    Pancreatitis 2016    Rheumatic tricuspid valve regurgitation 2016    Obstruction of esophagus 2016      LOS (days): 1  Geometric Mean LOS (GMLOS) (days): 4 60  Days to GMLOS:3 2     OBJECTIVE:    Risk of Unplanned Readmission Score: 12 71         Current admission status: Inpatient       Preferred Pharmacy:   [id number],  [facility] [address] [place] [address]  Phone: [phone] Fax: [phone]    [facility]/pharmacy #[id number] [id number]  Phone: [phone] Fax: [phone]    Primary Care Provider: Juno Jacobs MD    Primary Insurance: MEDICARE  Secondary Insurance: BLUE CROSS    ASSESSMENT:  Active Health Care Proxies     Merle [facility] Representative - Other   Primary Phone: [phone] (Home)               Advance Directives  Does patient have a [address]?: Yes  Does patient have Advance Directives?: Yes  Advance Directives: Power of  for health care  Primary Contact: Fausto Black         Readmission Root Cause  30 Day Readmission: No    Patient Information  Admitted from[de-identified] Facility [facility])  Mental Status: Confused  During Assessment patient was accompanied by: Not accompanied during assessment  Assessment information provided by[de-identified] Other - please comment (jany sorenson)  Primary Caregiver: Other (Comment)  Caregiver's Name[de-identified] [facility]  Caregiver's Relationship to Patient[de-identified] Other (Specify)  Caregiver's Telephone Number[de-identified] [phone]  Support Systems: Other (Comment) (staff at assisted living)  [place] Residence: [address] [place] [address] Avenue do you live in?: [place]  Home entry access options   Select all that apply : No steps to enter home  Type of Current Residence: Facility  Upon entering residence, is there a bedroom on the main floor (no further steps)?: Yes  Upon entering residence, is there a bathroom on the main floor (no further steps)?: Yes  In the last 12 months, was there a time when you were not able to pay the mortgage or rent on time?: No  In the last 12 months, how many places have you lived?: 1  In the last 12 months, was there a time when you did not have a steady place to sleep or slept in a shelter (including now)?: No  Homeless/housing insecurity resource given?: N/A  Living Arrangements: Other (Comment) (lives in assisted facility)  Is patient a ?: No    Activities of Daily Living Prior to Admission  Functional Status: Assistance  Completes ADLs independently?: No  Level of ADL dependence: Assistance  Ambulates independently?: No  Level of ambulatory dependence: Assistance  Does patient use assisted devices?: Yes  Assisted Devices (DME) used: Donnal Sep, Wheelchair  Does patient currently own DME?: Yes  What DME does the patient currently own?: Donnal Sep, Wheelchair  Does patient have a history of Outpatient Therapy (PT/OT)?: Yes (recieved at Assisted living)  Does the patient have a history of Short-Term Rehab?: No  Does patient have a history of HHC?: No  Does patient currently have [facility]?: No         Patient Information Continued  Income Source: Pension/[employer]  Does patient have prescription coverage?: Yes  Within the past 12 months, you worried that your food would run out before you got the money to buy more : Never true  Within the past 12 months, the food you bought just didn't last and you didn't have money to get more : Never true  Food insecurity resource given?: N/A  Does patient receive dialysis treatments?: No  Does patient have a history of substance abuse?: No  Does patient have a history of Mental Health Diagnosis?: No         Means of Transportation  Means of Transport to Appts[de-identified] Other (Comment) (medical transport)  In the past 12 months, has lack of transportation kept you from medical appointments or from getting medications?: No  In the past 12 months, has lack of transportation kept you from meetings, work, or from getting things needed for daily living?: No  Was application for public transport provided?: N/A  Cm met with the patient to evaluate the patients prior function and living situation and any barriers to d/c and form a safe d/c plan  Cm also evaluated the patient for any services in the home or needs for services  CM also discussed with patient that their preferences will be taken into account/consideration  Pt confused  Was able to state name and   Unsure of year  Does know she is in the hospital and did tell me she resides at [facility]  Spoke with jany Magdaleno via phone  Pt has resided at [facility] for over 2 years and was recently moved to the dementia unit  She was walking independently at times  Currently therapy recommending she go to [facility] but jany would like to see how she does over next few days and talk with her family regarding that  She is unsure how she will do in a unfamiliar setting  Spoke with Adonay Grider at [facility] regarding same  Will keep her posted  Cm to follow

## 2023-04-24 NOTE — PLAN OF CARE
Problem: PAIN - ADULT  Goal: Verbalizes/displays adequate comfort level or baseline comfort level  Description: Interventions:  - Encourage patient to monitor pain and request assistance  - Assess pain using appropriate pain scale  - Administer analgesics based on type and severity of pain and evaluate response  - Implement non-pharmacological measures as appropriate and evaluate response  - Consider cultural and social influences on pain and pain management  - Notify physician/advanced practitioner if interventions unsuccessful or patient reports new pain  Outcome: Progressing     Problem: INFECTION - ADULT  Goal: Absence or prevention of progression during hospitalization  Description: INTERVENTIONS:  - Assess and monitor for signs and symptoms of infection  - Monitor lab/diagnostic results  - Monitor all insertion sites, i e  indwelling lines, tubes, and drains  - Monitor endotracheal if appropriate and nasal secretions for changes in amount and color  - [place] appropriate cooling/warming therapies per order  - Administer medications as ordered  - Instruct and encourage patient and family to use good hand hygiene technique  - Identify and instruct in appropriate isolation precautions for identified infection/condition  Outcome: Progressing  Goal: Absence of fever/infection during neutropenic period  Description: INTERVENTIONS:  - Monitor WBC    Outcome: Progressing     Problem: SAFETY ADULT  Goal: Patient will remain free of falls  Description: INTERVENTIONS:  - Educate patient/family on patient safety including physical limitations  - Instruct patient to call for assistance with activity   - Consult OT/PT to assist with strengthening/mobility   - Keep Call bell within reach  - Keep bed low and locked with side rails adjusted as appropriate  - Keep care items and personal belongings within reach  - Initiate and maintain comfort rounds  - Make Fall Risk Sign visible to staff  - Offer Toileting every 2 Hours, in advance of need  - Initiate/Maintain bed/chair alarm  - Obtain necessary fall risk management equipment: bed/chair alarm, nonskid socks   - Apply yellow socks and bracelet for high fall risk patients  - Consider moving patient to room near nurses station  Outcome: Progressing  Goal: Maintain or return to baseline ADL function  Description: INTERVENTIONS:  -  Assess patient's ability to carry out ADLs; assess patient's baseline for ADL function and identify physical deficits which impact ability to perform ADLs (bathing, care of mouth/teeth, toileting, grooming, dressing, etc )  - Assess/evaluate cause of self-care deficits   - Assess range of motion  - Assess patient's mobility; develop plan if impaired  - Assess patient's need for assistive devices and provide as appropriate  - Encourage maximum independence but intervene and supervise when necessary  - Involve family in performance of ADLs  - Assess for home care needs following discharge   - Consider OT consult to assist with ADL evaluation and planning for discharge  - Provide patient education as appropriate  Outcome: Progressing  Goal: Maintains/Returns to pre admission functional level  Description: INTERVENTIONS:  - Perform BMAT or MOVE assessment daily    - Set and communicate daily mobility goal to care team and patient/family/caregiver  - Collaborate with rehabilitation services on mobility goals if consulted  - Perform Range of Motion 3 times a day  - Reposition patient every 3 hours    - Dangle patient 3 times a day  - Stand patient 3 times a day  - Ambulate patient 3 times a day  - Out of bed to chair 3 times a day   - Out of bed for meals 3 times a day  - Out of bed for toileting  - Record patient progress and toleration of activity level   Outcome: Progressing     Problem: DISCHARGE PLANNING  Goal: Discharge to home or other facility with appropriate resources  Description: INTERVENTIONS:  - Identify barriers to discharge w/patient and caregiver  - Arrange for needed discharge resources and transportation as appropriate  - Identify discharge learning needs (meds, wound care, etc )  - Arrange for interpretive services to assist at discharge as needed  - Refer to Case Management Department for coordinating discharge planning if the patient needs post-hospital services based on physician/advanced practitioner order or complex needs related to functional status, cognitive ability, or social support system  Outcome: Progressing     Problem: Knowledge Deficit  Goal: Patient/family/caregiver demonstrates understanding of disease process, treatment plan, medications, and discharge instructions  Description: Complete learning assessment and assess knowledge base  Interventions:  - Provide teaching at level of understanding  - Provide teaching via preferred learning methods  Outcome: Progressing     Problem: MOBILITY - ADULT  Goal: Maintain or return to baseline ADL function  Description: INTERVENTIONS:  -  Assess patient's ability to carry out ADLs; assess patient's baseline for ADL function and identify physical deficits which impact ability to perform ADLs (bathing, care of mouth/teeth, toileting, grooming, dressing, etc )  - Assess/evaluate cause of self-care deficits   - Assess range of motion  - Assess patient's mobility; develop plan if impaired  - Assess patient's need for assistive devices and provide as appropriate  - Encourage maximum independence but intervene and supervise when necessary  - Involve family in performance of ADLs  - Assess for home care needs following discharge   - Consider OT consult to assist with ADL evaluation and planning for discharge  - Provide patient education as appropriate  Outcome: Progressing  Goal: Maintains/Returns to pre admission functional level  Description: INTERVENTIONS:  - Perform BMAT or MOVE assessment daily    - Set and communicate daily mobility goal to care team and patient/family/caregiver     - Collaborate with rehabilitation services on mobility goals if consulted  - Perform Range of Motion 3 times a day  - Reposition patient every 3 hours  - Dangle patient 3 times a day  - Stand patient 3 times a day  - Ambulate patient 3 times a day  - Out of bed to chair 3 times a day   - Out of bed for meals 3 times a day  - Out of bed for toileting  - Record patient progress and toleration of activity level   Outcome: Progressing     Problem: Nutrition/Hydration-ADULT  Goal: Nutrient/Hydration intake appropriate for improving, restoring or maintaining nutritional needs  Description: Monitor and assess patient's nutrition/hydration status for malnutrition  Collaborate with interdisciplinary team and initiate plan and interventions as ordered  Monitor patient's weight and dietary intake as ordered or per policy  Utilize nutrition screening tool and intervene as necessary  Determine patient's food preferences and provide high-protein, high-caloric foods as appropriate       INTERVENTIONS:  - Monitor oral intake, urinary output, labs, and treatment plans  - Assess nutrition and hydration status and recommend course of action  - Evaluate amount of meals eaten  - Assist patient with eating if necessary   - Allow adequate time for meals  - Recommend/ encourage appropriate diets, oral nutritional supplements, and vitamin/mineral supplements  - Order, calculate, and assess calorie counts as needed  - Recommend, monitor, and adjust tube feedings and TPN/PPN based on assessed needs  - Assess need for intravenous fluids  - Provide specific nutrition/hydration education as appropriate  - Include patient/family/caregiver in decisions related to nutrition  Outcome: Progressing     Problem: Prexisting or High Potential for Compromised Skin Integrity  Goal: Skin integrity is maintained or improved  Description: INTERVENTIONS:  - Identify patients at risk for skin breakdown  - Assess and monitor skin integrity  - Assess and monitor nutrition and hydration status  - Monitor labs   - Assess for incontinence   - Turn and reposition patient  - Assist with mobility/ambulation  - Relieve pressure over bony prominences  - Avoid friction and shearing  - Provide appropriate hygiene as needed including keeping skin clean and dry  - Evaluate need for skin moisturizer/barrier cream  - Collaborate with interdisciplinary team   - Patient/family teaching  - Consider wound care consult   Outcome: Progressing

## 2023-04-24 NOTE — PHYSICAL THERAPY NOTE
Physical Therapy Evaluation    Patient Name: Corey Hightower    AXSYW'K Date: 4/24/2023     Problem List  Principal Problem:    Ileus ([address] )  Active Problems:    GERD (gastroesophageal reflux disease)    Dementia (HCC)    Anxiety    Constipated    Primary hypertension    Abnormal CT of the abdomen    Pneumonia due to infectious organism       Past Medical History  Past Medical History:   Diagnosis Date    Dysphagia     GERD (gastroesophageal reflux disease)         Past Surgical History  Past Surgical History:   Procedure Laterality Date    CATARACT EXTRACTION Bilateral     [facility]    CHOLECYSTECTOMY  2015    EGD      x2 and was dilated    HYSTERECTOMY             04/24/23 0949   PT Last Visit   PT Visit Date 04/24/23   Note Type   Note type Evaluation   Pain Assessment   Pain Assessment Tool 0-10   Pain Score No Pain   Restrictions/Precautions   Weight Bearing Precautions Per Order No   Other Precautions Fall Risk;Multiple lines;1:1;Bed Alarm;Cognitive   Home Living   Type of Home Assisted living   Additional Comments pt is a long term resident of [facility]   Prior Function   Level of [place] Independent with functional mobility; Needs assistance with ADLs; Needs assistance with IADLS  (per [employer] staff)   Lives With Facility staff   Receives Help From Personal care attendant   IADLs Family/Friend/Other provides transportation; Family/Friend/Other provides meals; Family/Friend/Other provides medication management   Falls in the last 6 months 1 to 4   Vocational Retired   General   Family/Caregiver Present No   Cognition   Overall Cognitive Status Impaired   Arousal/Participation Alert   Attention Difficulty attending to directions   Orientation Level Disoriented X4   Following Commands Follows one step commands inconsistently   Comments pt talking to herself upon start and end of session; unable to accurately answer questions   Subjective "  Subjective \"Do you mean me Paris Kramer or the other Paris Kramer? \"   RLE Assessment   RLE Assessment X  (3+/5 grossly)   LLE Assessment   LLE Assessment X  (3+/5 grossly)   Vision-Basic Assessment   Current Vision Other (Comment)  (pt denies visual issues but is unable to accurately state items well within her visual field)   Bed Mobility   Rolling R 3  Moderate assistance   Additional items Assist x 1; Increased time required;LE management;Verbal cues   Rolling L 3  Moderate assistance   Additional items Assist x 1; Increased time required;Verbal cues   Supine to Sit 2  Maximal assistance   Additional items Assist x 2; Increased time required;Verbal cues;LE management   Sit to Supine 2  Maximal assistance   Additional items Assist x 2; Increased time required;Verbal cues;LE management   Transfers   Sit to Stand Unable to assess   Stand to Sit Unable to assess   Ambulation/Elevation   Gait pattern Not appropriate; Not tested   Balance   Static Sitting Poor   Dynamic Sitting Poor -   Endurance Deficit   Endurance Deficit Yes   Activity Tolerance   Activity Tolerance Patient limited by fatigue; Other (Comment)  (limited by cognitive status)   Assessment   Prognosis Good   Problem List Decreased strength;Decreased endurance; Impaired balance;Decreased mobility; Decreased cognition; Impaired judgement;Decreased safety awareness   Assessment Patient is a 80 y o  female evaluated by Physical Therapy s/p admit to [address] [place] [address],[address] on 4/23/2023 with admitting diagnosis of: Ileus, Abdominal pain, Nausea and vomiting, and principal problem of: Ileus  PT was consulted to assess patient's functional mobility and discharge needs  Ordered are PT Evaluation and treatment with activity level of: up and OOB as tolerated  Comorbidities affecting patient's physical performance at time of assessment include: GERD, dementia, HTN, HLD, osteoporosis   Personal factors affecting the patient at time of IE include: communication issues, inability to " navigate community distances, impaired cognition, impaired safety awareness, decreased initiation and engagement, inability/difficulty performing IADLs and inability/difficulty performing ADLs  Please locate objective findings from PT assessment regarding body systems outlined above  Upon evaluation, pt requiring mod-maxA x 2, increased time, and max verbal cuing to attempt all functional mobility  Pt tangential in speech but unable to participate in meaningful conversation  Pt able to sit EOB with Kylee to maintain upright posture and did appear to be in agreement with attempting sit to stand transfer with hand held assist x 2, however upon initiation of transfer, pt began with severe whole-body tremors and forcefully leaned backwards into bed  Pt unable to be redirected to safely assisted back to seated position, so pt repositioned in supine position for safety and comfort  Rolling to R and L performed with modA so that simone care could be performed dependently, as pt incontinent of urine during session  The patient's AM-PAC Basic Mobility Inpatient Short Form Raw Score is 6  A Raw score of less than or equal to 16 suggests the patient may benefit from discharge to post-acute rehabilitation services  Please also refer to the recommendation of the Physical Therapist for safe discharge planning  Co treatment with OT secondary to complex medical condition of pt, possible A of 2 required to achieve and maintain transitional movements, requiring the need of skilled therapeutic intervention of 2 therapists to achieve delivery of services  Pt will benefit from continued PT intervention during LOS to address current deficits, increase LOF, and facilitate safe d/c to next level of care when medically appropriate  D/c recommendation at this time is post-acute rehabilitation services     Goals   Patient Goals N/A   LTG Expiration Date 05/08/23   Long Term Goal #1 Pt will participate in B LE strengthening exercises to facilitate improved functional activity tolerance  Pt will perform all functional transfers and bed mobility with Kylee and good safety awareness  Pt will ambulate 48' with Kylee and LRAD while maintaining good functional dynamic balance  Plan   Treatment/Interventions Functional transfer training;LE strengthening/ROM; Therapeutic exercise; Endurance training;Bed mobility;Gait training   PT Frequency 3-5x/wk   Recommendation   PT Discharge Recommendation Post acute rehabilitation services   AM-PAC Basic Mobility Inpatient   Turning in Flat Bed Without Bedrails 1   Lying on Back to Sitting on Edge of Flat Bed Without Bedrails 1   Moving Bed to Chair 1   Standing Up From Chair Using Arms 1   Walk in Room 1   Climb 3-5 Stairs With Railing 1   Basic Mobility Inpatient Raw Score 6   Turning Head Towards Sound 2   Follow Simple Instructions 1   Low Function Basic Mobility Raw Score  9   Low Function Basic Mobility Standardized Score  12 55   Highest Level Of Mobility   JH-HLM Goal 2: Bed activities/Dependent transfer   -HLM Achieved 3: Sit at edge of bed   End of Consult   Patient Position at End of Consult All needs within reach; Supine;Bed/Chair alarm activated; Other (comment)  (virtual 1:1 intact)

## 2023-04-24 NOTE — PLAN OF CARE
Problem: OCCUPATIONAL THERAPY ADULT  Goal: Performs self-care activities at highest level of function for planned discharge setting  See evaluation for individualized goals  Description: Treatment Interventions: ADL retraining, Functional transfer training, UE strengthening/ROM, Endurance training, Cognitive reorientation, Patient/family training, Equipment evaluation/education, Activityengagement          See flowsheet documentation for full assessment, interventions and recommendations  4/24/2023 1155 by Isaac Raygoza OT  Note: Limitation: Decreased ADL status, Decreased UE strength, Decreased Safe judgement during ADL, Decreased endurance, Decreased self-care trans, Decreased high-level ADLs, Decreased cognition     Assessment: Pt is a 80 y o  female seen for OT evaluation s/p admit to [facility] on 4/23/2023 w/ Ileus ([facility] [address] )  Comorbidities affecting pt's functional performance at time of assessment include: GERD, dysphagia, anxiety, HTN, constipated, dementia, pneumonia, ileus  Personal factors affecting pt at time of IE include:behavioral pattern, difficulty performing ADLS, difficulty performing IADLS , limited insight into deficits, decreased initiation and engagement  and health management   Prior to admission, pt was (A) with ADLs and IADLs with unclear mobility status due to cognitive status  Upon evaluation: Pt requires mod-max (A) x2 with bed mobility and ADL performance 2* the following deficits impacting occupational performance: weakness, decreased strength, decreased balance, decreased tolerance, impaired attention, impaired initiation, impaired memory, impaired sequencing, impaired problem solving, impulsivity, decreased safety awareness and impaired interpersonal skills  Pt to benefit from continued skilled OT tx while in the hospital to address deficits as defined above and maximize level of functional independence w ADL's and functional mobility   Occupational Performance areas to address include: grooming, bathing/shower, toilet hygiene, dressing, functional mobility, community mobility and clothing management  The patient's raw score on the AM-PAC Daily Activity Inpatient Short Form is 13  A raw score of less than 19 suggests the patient may benefit from discharge to post-acute rehabilitation services  Please refer to the recommendation of the Occupational Therapist for safe discharge planning  Pt benefited from co-evaluation of skilled OT and PT therapists in order to most appropriately address functional deficits d/t extensive assistance required for safe functional mobility, decreased activity tolerance, and regression from functioning level prior to admission and/or onset of present illness  OT/PT objectives were addressed separately; please see PT note for specific goal areas targeted       OT Discharge Recommendation: Post acute rehabilitation services

## 2023-04-24 NOTE — ASSESSMENT & PLAN NOTE
Continue antibiotics patient does have elevated procalcitonin as well as leukocytosis today    Repeat a m  labs, respiratory status stable

## 2023-04-24 NOTE — ASSESSMENT & PLAN NOTE
Pt is presenting with generalized abdominal tenderness without rebound or guarding in setting of loose stools and hx of constipation  · CT abd pelvis showed fluid filled stool but no transition point which is consistent with ileus   · Continue IV fluid, maintain n p o  status, case discussed with surgical team, family declined NG tube

## 2023-04-24 NOTE — MALNUTRITION/BMI
This medical record reflects one or more clinical indicators suggestive of malnutrition  Malnutrition Findings:   Adult Malnutrition type: Chronic illness  Adult Degree of Malnutrition: Malnutrition of moderate degree  Malnutrition Characteristics: Fat loss, Muscle loss                360 Statement: Chronic, moderate protein-calorie malnutrition related to chronic illness as evidenced by mild muscle wasting to clavicle and temple regions, mild subcutaneous fat loss to orbitals, and less than desirable BMI  Patient currently NPO-will monitor diet advancement and initiate nutrition supplement if appropriate  BMI Findings:  Adult BMI Classifications: Underweight < 18 5        Body mass index is 17 94 kg/m²  See Nutrition note dated 4/24/2023 for additional details  Completed nutrition assessment is viewable in the nutrition documentation

## 2023-04-24 NOTE — PLAN OF CARE
Problem: PHYSICAL THERAPY ADULT  Goal: Performs mobility at highest level of function for planned discharge setting  See evaluation for individualized goals  Description: Treatment/Interventions: Functional transfer training, LE strengthening/ROM, Therapeutic exercise, Endurance training, Bed mobility, Gait training          See flowsheet documentation for full assessment, interventions and recommendations  Note: Prognosis: Good  Problem List: Decreased strength, Decreased endurance, Impaired balance, Decreased mobility, Decreased cognition, Impaired judgement, Decreased safety awareness  Assessment: Patient is a 80 y o  female evaluated by Physical Therapy s/p admit to [address] [place] [address] on 4/23/2023 with admitting diagnosis of: Ileus, Abdominal pain, Nausea and vomiting, and principal problem of: Ileus  PT was consulted to assess patient's functional mobility and discharge needs  Ordered are PT Evaluation and treatment with activity level of: up and OOB as tolerated  Comorbidities affecting patient's physical performance at time of assessment include: GERD, dementia, HTN, HLD, osteoporosis  Personal factors affecting the patient at time of IE include: communication issues, inability to navigate community distances, impaired cognition, impaired safety awareness, decreased initiation and engagement, inability/difficulty performing IADLs and inability/difficulty performing ADLs  Please locate objective findings from PT assessment regarding body systems outlined above  Upon evaluation, pt requiring mod-maxA x 2, increased time, and max verbal cuing to attempt all functional mobility  Pt tangential in speech but unable to participate in meaningful conversation   Pt able to sit EOB with Kylee to maintain upright posture and did appear to be in agreement with attempting sit to stand transfer with hand held assist x 2, however upon initiation of transfer, pt began with severe whole-body tremors and forcefully leaned backwards into bed  Pt unable to be redirected to safely assisted back to seated position, so pt repositioned in supine position for safety and comfort  Rolling to R and L performed with modA so that simone care could be performed dependently, as pt incontinent of urine during session  The patient's AM-PAC Basic Mobility Inpatient Short Form Raw Score is 6  A Raw score of less than or equal to 16 suggests the patient may benefit from discharge to post-acute rehabilitation services  Please also refer to the recommendation of the Physical Therapist for safe discharge planning  Co treatment with OT secondary to complex medical condition of pt, possible A of 2 required to achieve and maintain transitional movements, requiring the need of skilled therapeutic intervention of 2 therapists to achieve delivery of services  Pt will benefit from continued PT intervention during LOS to address current deficits, increase LOF, and facilitate safe d/c to next level of care when medically appropriate  D/c recommendation at this time is post-acute rehabilitation services  PT Discharge Recommendation: Post acute rehabilitation services    See flowsheet documentation for full assessment

## 2023-04-24 NOTE — OCCUPATIONAL THERAPY NOTE
"    Occupational Therapy Evaluation     Patient Name: Aman Gaviria  PPPWF'L Date: 4/24/2023  Problem List  Principal Problem:    Ileus ([address] )  Active Problems:    GERD (gastroesophageal reflux disease)    Dementia (HCC)    Anxiety    Constipated    Primary hypertension    Abnormal CT of the abdomen    Pneumonia due to infectious organism    Past Medical History  Past Medical History:   Diagnosis Date    Dysphagia     GERD (gastroesophageal reflux disease)      Past Surgical History  Past Surgical History:   Procedure Laterality Date    CATARACT EXTRACTION Bilateral     formerly [facility] area    CHOLECYSTECTOMY  2015    EGD      x2 and was dilated    HYSTERECTOMY               04/24/23 0947   OT Last Visit   OT Visit Date 04/24/23   Note Type   Note type Evaluation   Pain Assessment   Pain Score No Pain   Restrictions/Precautions   Weight Bearing Precautions Per Order No   Other Precautions Impulsive; Bed Alarm;1:1;Fall Risk;Multiple lines   Home Living   Type of Home Assisted living   Additional Comments pt is resident of [facility]; unclear baseline mobility due to significant dementia   Prior Function   Level of [place] Needs assistance with ADLs; Needs assistance with functional mobility; Needs assistance with [address]   Receives Help From Personal care attendant   IADLs Family/Friend/Other provides transportation; Family/Friend/Other provides meals; Family/Friend/Other provides medication management   Falls in the last 6 months 1 to 4   Vocational Retired   [address]? \"   ADL   Where Assessed Edge of bed   LB Dressing Assistance 2  Maximal Assistance   LB Dressing Deficit Thread RLE into underwear; Thread LLE into underwear;Pull up over hips   Toileting Assistance  2  Maximal Assistance   Toileting Deficit Clothing management up;Clothing management down;Perineal hygiene   Additional Comments pt incontinent of urine and requires max (A) for simone hygiene and " donning pullup; pt also requires max (A) for sock donning; decreased ability to follow directions and focus on tasks during session   Bed Mobility   Rolling R 3  Moderate assistance   Additional items Assist x 1;Bedrails; Increased time required;Verbal cues   Rolling L 3  Moderate assistance   Additional items Assist x 1;Bedrails; Increased time required;Verbal cues   Supine to Sit 2  Maximal assistance   Additional items Assist x 2;Bedrails; Increased time required;Verbal cues;LE management   Sit to Supine 2  Maximal assistance   Additional items Assist x 2; Increased time required;Verbal cues;LE management   Additional Comments pt on RA with no complaints of SOB and SpO2 WFL; pt seated EOB with posterior lean and inability to weigh shift appropriately; pt requires physical assist to bring feet to floor and for sititng balance with mod-max (A); pt returns herself to supine after experiencing extreme anxiety and 4 limb tremors; pt unsafe to then transfer to chair due to severe anxiety, tremors, and unpredictable mobility status; returned to supine at end of session; 1:1 in place virtually   Transfers   Sit to Stand Unable to assess   Stand to Sit Unable to assess   Functional Mobility   Additional Comments unable to assess   Balance   Static Sitting Poor -   Dynamic Sitting Poor -   Activity Tolerance   Activity Tolerance Patient limited by fatigue; Other (Comment)  (anxiety and cognitive status)   RUE Assessment   RUE Assessment WFL   LUE Assessment   LUE Assessment WFL   Hand Function   Gross Motor Coordination Functional   Fine Motor Coordination Functional   Sensation   Light Touch No apparent deficits   Sharp/Dull No apparent deficits   Vision-Basic Assessment   Current Vision Other (Comment)  (pt reports no deficits, however staring at ceiling throughout session and does not move eyes appropriately to voices)   Psychosocial   Psychosocial (WDL) X   Patient Behaviors/Mood Anxious   Cognition   Overall Cognitive Status Impaired   Arousal/Participation Uncooperative; Alert   Attention Difficulty dividing attention   Orientation Level Disoriented X4   Memory Unable to assess   Following Commands Follows one step commands inconsistently   Comments (S)  pt tangential and with conversation in room with no one present; pt also appears to be conversating with ceiling during session; ? visual and auditory hallucinations   Assessment   Limitation Decreased ADL status; Decreased UE strength;Decreased Safe judgement during ADL;Decreased endurance;Decreased self-care trans;Decreased high-level ADLs; Decreased cognition   Assessment Pt is a 80 y o  female seen for OT evaluation s/p admit to [facility] on 4/23/2023 w/ Ileus ([facility] [address] )  Comorbidities affecting pt's functional performance at time of assessment include: GERD, dysphagia, anxiety, HTN, constipated, dementia, pneumonia, ileus  Personal factors affecting pt at time of IE include:behavioral pattern, difficulty performing ADLS, difficulty performing IADLS , limited insight into deficits, decreased initiation and engagement  and health management   Prior to admission, pt was (A) with ADLs and IADLs with unclear mobility status due to cognitive status  Upon evaluation: Pt requires mod-max (A) x2 with bed mobility and ADL performance 2* the following deficits impacting occupational performance: weakness, decreased strength, decreased balance, decreased tolerance, impaired attention, impaired initiation, impaired memory, impaired sequencing, impaired problem solving, impulsivity, decreased safety awareness and impaired interpersonal skills  Pt to benefit from continued skilled OT tx while in the hospital to address deficits as defined above and maximize level of functional independence w ADL's and functional mobility  Occupational Performance areas to address include: grooming, bathing/shower, toilet hygiene, dressing, functional mobility, community mobility and clothing management   The patient's raw score on the AM-PAC Daily Activity Inpatient Short Form is 13  A raw score of less than 19 suggests the patient may benefit from discharge to post-acute rehabilitation services  Please refer to the recommendation of the Occupational Therapist for safe discharge planning  Pt benefited from co-evaluation of skilled OT and PT therapists in order to most appropriately address functional deficits d/t extensive assistance required for safe functional mobility, decreased activity tolerance, and regression from functioning level prior to admission and/or onset of present illness  OT/PT objectives were addressed separately; please see PT note for specific goal areas targeted  Goals   Patient Goals no goal stated due to cognitive status   Short Term Goal  pt will perform bed mobility with min (A) x2 level   Long Term Goal #1 pt will perform UE strengthening exercises   Long Term Goal #2 pt will demonstrate functional transfers with RW at min (A) level   Long Term Goal pt will demonstrate UB bathing and grooming tasks at min (A) level   Plan   Treatment Interventions ADL retraining;Functional transfer training;UE strengthening/ROM; Endurance training;Cognitive reorientation;Patient/family training;Equipment evaluation/education; Activityengagement   Goal Expiration Date 05/08/23   OT Frequency 3-5x/wk   Recommendation   OT Discharge Recommendation Post acute rehabilitation services   Additional Comments  (S)  pt is not appropriate functionally to return to [facility] level of care; requires STR at [facility] currently   AM-PAC Daily Activity Inpatient   Lower Body Dressing 1   Bathing 2   Toileting 2   Upper Body Dressing 2   Grooming 3   Eating 3   Daily Activity Raw Score 13   Daily Activity Standardized Score (Calc for Raw Score >=11) 32 03   AM-PAC Applied Cognition Inpatient   Following a Speech/Presentation 2   Understanding Ordinary Conversation 1   Taking Medications 1   Remembering Where Things Are Placed or Put Away 1 Remembering List of 4-5 Errands 1   Taking Care of Complicated Tasks 1   Applied Cognition Raw Score 7   Applied Cognition Standardized Score 15 17

## 2023-04-24 NOTE — PROGRESS NOTES
Progress Note - General Surgery   Jeanne Plant 80 y o  female MRN: [id number]  Unit/Bed#: 405-01 Encounter: [id number]    Assessment:  Increased leukocytosis and procalcitonin this morning  Patient afebrile  Likely aspiration pneumonia, chest x-ray patchy opacities in both lung bases right greater than left, nonspecific and may represent chronic changes due to atelectasis/scarring similar appearance from January of this year    CT abdomen pelvis with IV contrast yesterday because a left lower quadrant abdominal pain showed distended loops of small bowel without transition point, compatible with ileus  Nonobstructing right renal stone  Right sided pneumonia  Dementia, history is difficult to obtain from the patient  The patient's niece (Milton Burks) will be contacted for update and also discussion about NG tube recommendation for gastric decompression  WBC 15 77 up from 10 12  Hemoglobin stable 11 7  Procalcitonin level 0 87  CMP shows normal electrolytes, creatinine 0 43    Vital signs reviewed, afebrile  SPO2 on room air 95%  Plan:  No plan for any general surgical intervention at this time, will continue to follow  Discussed with Dr Serge Don, attending hospitalist  Patient should have a straight catheterization for urinalysis, UTI could potentially be cause of an ileus  At this time we will hold off on NG tube placement  We will obtain an obstruction series tomorrow morning, Tuesday  Maintain n p o   IV fluids for hydration  A m  labs including CBC, BMP  Aspiration precautions  The patient will need a speech/swallowing evaluation prior to advancing diet as she has suspected aspiration pneumonia    IV Protonix for GI acid suppression  Elevate head of bed 30 degrees Semi-Alejo position to help prevent aspiration if she develops for event of vomiting  Management of medical comorbidities at the direction the attending primary service  She is a DNAR/DNI CODE STATUS  Disposition planning to "include the patient's POA, regarding goals of care    Subjective/Objective      Chief Complaint: Patient unable to provide reliable response to questions  No further episodes of vomiting  Subjective: 80-year-old female with dementia presented to the ED now with a 2 to 3-day history of generalized abdominal pain and multiple episodes of nonbloody nonbilious vomiting  The patient is a resident at [facility]  Her dementia prevents from obtaining any history from the patient  Her POA is a niece, Jean Claude Tejada, and conversation with her upon admission documented that she was unsure to proceed with NG tube recommendation to decompress the patient's stomach due to gastric distention and risk for emesis, further aspiration as the likely cause for the patient's existing presumed pneumonia  Scheduled Meds:  Current Facility-Administered Medications   Medication Dose Route Frequency Provider Last Rate    acetaminophen  650 mg Oral Q6H PRN Marleta Fire, DO      amLODIPine  5 mg Oral Daily Jie Richards, DO      cefTRIAXone  1,000 mg Intravenous Q24H Ken Sahu MD 1,000 mg (04/24/23 1830)    clonazePAM  0 5 mg Oral BID Marleta Fire, DO      enoxaparin  40 mg Subcutaneous BID Jie Richards, DO      OLANZapine  5 mg Intramuscular Q3H PRN Ken Sahu MD      sertraline  25 mg Oral QAM Marleta Fire, DO      sodium chloride  75 mL/hr Intravenous Continuous Jie Richards, DO 75 mL/hr (04/24/23 1033)    traZODone  50 mg Oral HS Jie Richards, DO      trimethobenzamide  200 mg Intramuscular Q6H PRN Jie Richards, DO       Continuous Infusions:sodium chloride, 75 mL/hr, Last Rate: 75 mL/hr (04/24/23 1033)      PRN Meds:   acetaminophen    OLANZapine    trimethobenzamide      Objective:     Blood pressure 125/75, pulse 91, temperature 97 6 °F (36 4 °C), resp  rate 18, height 5' 4\" (1 626 m), weight 47 4 kg (104 lb 8 oz), SpO2 95 %  ,Body mass index is 17 94 kg/m²        Intake/Output Summary (Last 24 " hours) at 4/24/2023 1105  Last data filed at 4/24/2023 0700  Gross per 24 hour   Intake 0 ml   Output --   Net 0 ml       Invasive Devices     Peripheral Intravenous Line  Duration           Peripheral IV 04/23/23 Dorsal (posterior); Left Hand 1 day                Physical Exam:   She is arousable  She does not respond much to any questions  She has obvious dementia  Skin decreased turgor, no rash or pallor  Neck veins appear flat  No bruit, trachea midline  Chest thin, symmetric, no respiratory distress  Lungs poor inspiratory effort, decreased breath sounds at both base sounds  No adventitious sounds are heard  Heart RRR  No murmur heard  Back mild kyphotic curvature  Abdomen distended, round  Bowel sounds are heard, frequent, hollow and distant throughout  The abdomen is soft, tympany present to percussion  No guarding or masses  There is some periumbilical and tenderness in the hypogastric region as witnessed with some facial wincing by the patient though without peritoneal signs including guarding, rigidity or rebound tenderness noted  Extremities the patient does not follow commands  Overall her extremities are thin with evidence of muscle wasting  Neurological exam finds the patient confused  Poor cognition and limited response to any questions  There is no tremor  She is disoriented  Cranial nerves appear symmetric  No tremors noted  Ambulation cannot be witnessed or attempted  Lab, Imaging and other studies:  I have personally reviewed pertinent lab results    , CBC:   Lab Results   Component Value Date    WBC 15 77 (H) 04/24/2023    HGB 11 7 04/24/2023    HCT 35 2 04/24/2023    MCV 96 04/24/2023     04/24/2023    MCH 32 1 04/24/2023    MCHC 33 2 04/24/2023    RDW 14 4 04/24/2023    MPV 10 0 04/24/2023   , CMP:   Lab Results   Component Value Date    SODIUM 138 04/24/2023    K 3 7 04/24/2023     04/24/2023    CO2 26 04/24/2023    BUN 8 04/24/2023    CREATININE 0 43 (L) 04/24/2023    CALCIUM 9 0 04/24/2023    AST 31 04/24/2023    ALT 16 04/24/2023    ALKPHOS 83 04/24/2023    EGFR 90 04/24/2023     VTE Pharmacologic Prophylaxis: Enoxaparin (Lovenox)  VTE Mechanical Prophylaxis: sequential compression device     Sarah Cooper PA-C

## 2023-04-25 ENCOUNTER — APPOINTMENT (INPATIENT)
Dept: RADIOLOGY | Facility: HOSPITAL | Age: 88
End: 2023-04-25

## 2023-04-25 PROBLEM — I48.91 ATRIAL FIBRILLATION WITH RAPID VENTRICULAR RESPONSE (HCC): Status: ACTIVE | Noted: 2023-04-25

## 2023-04-25 PROBLEM — E44.0 MODERATE PROTEIN-CALORIE MALNUTRITION (HCC): Status: ACTIVE | Noted: 2023-04-25

## 2023-04-25 LAB
2HR DELTA HS TROPONIN: 19 NG/L
4HR DELTA HS TROPONIN: 35 NG/L
ALBUMIN SERPL BCP-MCNC: 3.5 G/DL (ref 3.5–5)
ALP SERPL-CCNC: 80 U/L (ref 34–104)
ALT SERPL W P-5'-P-CCNC: 17 U/L (ref 7–52)
ANION GAP SERPL CALCULATED.3IONS-SCNC: 13 MMOL/L (ref 4–13)
APTT PPP: 42 SECONDS (ref 23–37)
APTT PPP: 74 SECONDS (ref 23–37)
AST SERPL W P-5'-P-CCNC: 30 U/L (ref 13–39)
ATRIAL RATE: 78 BPM
ATRIAL RATE: 81 BPM
BASOPHILS # BLD AUTO: 0.01 THOUSANDS/ΜL (ref 0–0.1)
BASOPHILS NFR BLD AUTO: 0 % (ref 0–1)
BILIRUB SERPL-MCNC: 0.54 MG/DL (ref 0.2–1)
BUN SERPL-MCNC: 13 MG/DL (ref 5–25)
CALCIUM SERPL-MCNC: 8.7 MG/DL (ref 8.4–10.2)
CARDIAC TROPONIN I PNL SERPL HS: 20 NG/L
CARDIAC TROPONIN I PNL SERPL HS: 39 NG/L
CARDIAC TROPONIN I PNL SERPL HS: 55 NG/L
CHLORIDE SERPL-SCNC: 103 MMOL/L (ref 96–108)
CO2 SERPL-SCNC: 22 MMOL/L (ref 21–32)
CREAT SERPL-MCNC: 0.41 MG/DL (ref 0.6–1.3)
EOSINOPHIL # BLD AUTO: 0.03 THOUSAND/ΜL (ref 0–0.61)
EOSINOPHIL NFR BLD AUTO: 0 % (ref 0–6)
ERYTHROCYTE [DISTWIDTH] IN BLOOD BY AUTOMATED COUNT: 14.4 % (ref 11.6–15.1)
ERYTHROCYTE [DISTWIDTH] IN BLOOD BY AUTOMATED COUNT: 14.6 % (ref 11.6–15.1)
GFR SERPL CREATININE-BSD FRML MDRD: 91 ML/MIN/1.73SQ M
GLUCOSE SERPL-MCNC: 61 MG/DL (ref 65–140)
HCT VFR BLD AUTO: 33.9 % (ref 34.8–46.1)
HCT VFR BLD AUTO: 35.1 % (ref 34.8–46.1)
HGB BLD-MCNC: 11 G/DL (ref 11.5–15.4)
HGB BLD-MCNC: 11.8 G/DL (ref 11.5–15.4)
IMM GRANULOCYTES # BLD AUTO: 0.03 THOUSAND/UL (ref 0–0.2)
IMM GRANULOCYTES NFR BLD AUTO: 0 % (ref 0–2)
INR PPP: 0.96 (ref 0.84–1.19)
INR PPP: 0.97 (ref 0.84–1.19)
LYMPHOCYTES # BLD AUTO: 0.68 THOUSANDS/ΜL (ref 0.6–4.47)
LYMPHOCYTES NFR BLD AUTO: 7 % (ref 14–44)
MAGNESIUM SERPL-MCNC: 2 MG/DL (ref 1.9–2.7)
MCH RBC QN AUTO: 31.3 PG (ref 26.8–34.3)
MCH RBC QN AUTO: 31.9 PG (ref 26.8–34.3)
MCHC RBC AUTO-ENTMCNC: 32.4 G/DL (ref 31.4–37.4)
MCHC RBC AUTO-ENTMCNC: 33.6 G/DL (ref 31.4–37.4)
MCV RBC AUTO: 95 FL (ref 82–98)
MCV RBC AUTO: 96 FL (ref 82–98)
MONOCYTES # BLD AUTO: 0.69 THOUSAND/ΜL (ref 0.17–1.22)
MONOCYTES NFR BLD AUTO: 7 % (ref 4–12)
NEUTROPHILS # BLD AUTO: 7.85 THOUSANDS/ΜL (ref 1.85–7.62)
NEUTS SEG NFR BLD AUTO: 86 % (ref 43–75)
NRBC BLD AUTO-RTO: 0 /100 WBCS
P AXIS: 56 DEGREES
P AXIS: 72 DEGREES
PHOSPHATE SERPL-MCNC: 2.7 MG/DL (ref 2.3–4.1)
PLATELET # BLD AUTO: 188 THOUSANDS/UL (ref 149–390)
PLATELET # BLD AUTO: 206 THOUSANDS/UL (ref 149–390)
PMV BLD AUTO: 10.2 FL (ref 8.9–12.7)
PMV BLD AUTO: 9.9 FL (ref 8.9–12.7)
POTASSIUM SERPL-SCNC: 3.4 MMOL/L (ref 3.5–5.3)
PR INTERVAL: 136 MS
PR INTERVAL: 150 MS
PROCALCITONIN SERPL-MCNC: 0.55 NG/ML
PROT SERPL-MCNC: 6.2 G/DL (ref 6.4–8.4)
PROTHROMBIN TIME: 13 SECONDS (ref 11.6–14.5)
PROTHROMBIN TIME: 13.1 SECONDS (ref 11.6–14.5)
QRS AXIS: -72 DEGREES
QRS AXIS: -75 DEGREES
QRSD INTERVAL: 104 MS
QRSD INTERVAL: 108 MS
QT INTERVAL: 420 MS
QT INTERVAL: 432 MS
QTC INTERVAL: 478 MS
QTC INTERVAL: 501 MS
RBC # BLD AUTO: 3.52 MILLION/UL (ref 3.81–5.12)
RBC # BLD AUTO: 3.7 MILLION/UL (ref 3.81–5.12)
SODIUM SERPL-SCNC: 138 MMOL/L (ref 135–147)
T WAVE AXIS: 65 DEGREES
T WAVE AXIS: 80 DEGREES
VENTRICULAR RATE: 78 BPM
VENTRICULAR RATE: 81 BPM
WBC # BLD AUTO: 11.6 THOUSAND/UL (ref 4.31–10.16)
WBC # BLD AUTO: 9.29 THOUSAND/UL (ref 4.31–10.16)

## 2023-04-25 PROCEDURE — 93005 ELECTROCARDIOGRAM TRACING: CPT

## 2023-04-25 RX ORDER — DEXTROSE AND SODIUM CHLORIDE 5; .9 G/100ML; G/100ML
75 INJECTION, SOLUTION INTRAVENOUS CONTINUOUS
Status: DISCONTINUED | OUTPATIENT
Start: 2023-04-25 | End: 2023-04-26

## 2023-04-25 RX ORDER — HEPARIN SODIUM 1000 [USP'U]/ML
1350 INJECTION, SOLUTION INTRAVENOUS; SUBCUTANEOUS EVERY 6 HOURS PRN
Status: DISCONTINUED | OUTPATIENT
Start: 2023-04-25 | End: 2023-04-26

## 2023-04-25 RX ORDER — DILTIAZEM HYDROCHLORIDE 5 MG/ML
INJECTION INTRAVENOUS
Status: COMPLETED
Start: 2023-04-25 | End: 2023-04-25

## 2023-04-25 RX ORDER — BISACODYL 10 MG
10 SUPPOSITORY, RECTAL RECTAL ONCE
Status: COMPLETED | OUTPATIENT
Start: 2023-04-25 | End: 2023-04-25

## 2023-04-25 RX ORDER — POTASSIUM CHLORIDE 14.9 MG/ML
20 INJECTION INTRAVENOUS ONCE
Status: COMPLETED | OUTPATIENT
Start: 2023-04-25 | End: 2023-04-25

## 2023-04-25 RX ORDER — HEPARIN SODIUM 10000 [USP'U]/100ML
3-20 INJECTION, SOLUTION INTRAVENOUS
Status: DISCONTINUED | OUTPATIENT
Start: 2023-04-25 | End: 2023-04-26

## 2023-04-25 RX ORDER — METOPROLOL TARTRATE 5 MG/5ML
5 INJECTION INTRAVENOUS ONCE
Status: COMPLETED | OUTPATIENT
Start: 2023-04-25 | End: 2023-04-25

## 2023-04-25 RX ORDER — DILTIAZEM HYDROCHLORIDE 5 MG/ML
10 INJECTION INTRAVENOUS ONCE
Status: COMPLETED | OUTPATIENT
Start: 2023-04-25 | End: 2023-04-25

## 2023-04-25 RX ORDER — METOPROLOL TARTRATE 5 MG/5ML
5 INJECTION INTRAVENOUS ONCE
Status: DISCONTINUED | OUTPATIENT
Start: 2023-04-25 | End: 2023-04-25

## 2023-04-25 RX ORDER — HEPARIN SODIUM 1000 [USP'U]/ML
2700 INJECTION, SOLUTION INTRAVENOUS; SUBCUTANEOUS EVERY 6 HOURS PRN
Status: DISCONTINUED | OUTPATIENT
Start: 2023-04-25 | End: 2023-04-26

## 2023-04-25 RX ORDER — HEPARIN SODIUM 1000 [USP'U]/ML
2700 INJECTION, SOLUTION INTRAVENOUS; SUBCUTANEOUS ONCE
Status: COMPLETED | OUTPATIENT
Start: 2023-04-25 | End: 2023-04-25

## 2023-04-25 RX ADMIN — CLONAZEPAM 0.5 MG: 0.5 TABLET ORAL at 17:50

## 2023-04-25 RX ADMIN — BISACODYL 10 MG: 10 SUPPOSITORY RECTAL at 14:28

## 2023-04-25 RX ADMIN — DEXTROSE AND SODIUM CHLORIDE 75 ML/HR: 5; .9 INJECTION, SOLUTION INTRAVENOUS at 22:46

## 2023-04-25 RX ADMIN — HEPARIN SODIUM 2700 UNITS: 1000 INJECTION INTRAVENOUS; SUBCUTANEOUS at 16:58

## 2023-04-25 RX ADMIN — DEXTROSE AND SODIUM CHLORIDE 75 ML/HR: 5; .9 INJECTION, SOLUTION INTRAVENOUS at 09:10

## 2023-04-25 RX ADMIN — DILTIAZEM HYDROCHLORIDE 10 MG: 5 INJECTION INTRAVENOUS at 15:52

## 2023-04-25 RX ADMIN — METOPROLOL TARTRATE 5 MG: 1 INJECTION, SOLUTION INTRAVENOUS at 15:28

## 2023-04-25 RX ADMIN — ENOXAPARIN SODIUM 40 MG: 40 INJECTION SUBCUTANEOUS at 09:10

## 2023-04-25 RX ADMIN — HEPARIN SODIUM 12 UNITS/KG/HR: 10000 INJECTION, SOLUTION INTRAVENOUS at 16:58

## 2023-04-25 RX ADMIN — POTASSIUM CHLORIDE 20 MEQ: 14.9 INJECTION, SOLUTION INTRAVENOUS at 09:10

## 2023-04-25 RX ADMIN — CEFTRIAXONE 1000 MG: 1 INJECTION, SOLUTION INTRAVENOUS at 05:00

## 2023-04-25 NOTE — PROGRESS NOTES
Patient received from 4th floor  Upon arrival patient was in A-fib w/RVR 's  Cardizem 5MG IV push given OT - soon after the patient converted back into NSR and 12 Lead EKG was done  Dr Maribel Peralta present in CCU at this time  Conferred with Dr Maribel Peralta regarding starting the Cardizem Drip and we will hold it at this time

## 2023-04-25 NOTE — PROGRESS NOTES
Progress Note - General Surgery   Corey Hightower 80 y o  female MRN: [id number]  Unit/Bed#: 405-01 Encounter: [id number]    Assessment:  Ileus  Possible small bowel obstruction  Suspect chronic constipation with fecal impaction  Dementia  Nonobstructing right renal stones  Right pneumonia, likely aspiration  Need to rule out UTI as possible cause of ileus  Moderate protein calorie malnutrition, chronic illness  Patient has evidence of fat and muscle loss  BMI 17 94  Chest x-ray on admission showed patchy opacities in both lungs right greater than left base  WBC 9 29  Hemoglobin stable 11 0  Potassium 3 4  Creatinine 0 41  Glucose 61  Blood cultures x2 no growth at 24 hours  Procalcitonin level 0 55 (0 87)  INR 0 96  Urinalysis was ordered yesterday by straight catheterization, not collected  Vital signs reviewed, afebrile  SPO2 on room air 95%  Plan:  No plan for any general surgical intervention  N p o , sips with meds  Obstruction series ordered for this morning, suspect constipation and evaluate stool burden  If no sign of obstruction, will advance diet to clear liquids  Patient will need a speech and swallowing eval performed  Aspiration precautions  Elevate head of bed 30 degrees Semi-Alejo  Family POA, jayn Levy, did not want NG tube insertion on admission  Maintain gentle IV fluids for hydration, discussed with Dr Danay Mendez  The patient's blood sugar 61 this morning  Will discontinue IV normal saline and start D5 normal saline at 75 mL/h  Potassium repletion, will give 20 mill colons potassium chloride IV piggyback over 2 hours at this time  JOSE ALBERTO's  She is not requiring any pain medication, only ordered for Tylenol orally which she is not taking    Continue IV Rocephin for treatment of suspected aspiration pneumonia  Medical management including antibiotic therapy for pneumonia as ordered by medicine  IV Protonix daily  Pure wick for urinary incontinence by nursing  Need to obtain "urinalysis by straight catheterization to rule out possible UTI  DNAR/DNI CODE STATUS  PT/OT daily, recommendations for postacute rehabilitation services after discharge    Subjective/Objective   Chief Complaint: Patient is confused, answers few questions  Subjective: No overnight events reported  The patient is comfortable at present  She remains on virtual surveillance monitoring  She is arousable  Limited answers to questions  Obvious dementia  She was asked if she has any pain, she replied no  Inquiry from nursing finds that the patient is not had any bowel function since admission  Right now the patient remains n p o , evidence of possible ileus on CT scan upon admission  The patient's niece is the power of   NG tube was not inserted on admission, the patient was noted to have gastric distention on imaging  No further nausea or vomiting  She is incontinent of urine  Scheduled Meds:  Current Facility-Administered Medications   Medication Dose Route Frequency Provider Last Rate    acetaminophen  650 mg Oral Q6H PRN Duey Pleva, DO      amLODIPine  5 mg Oral Daily Jieblank Oatesi, DO      cefTRIAXone  1,000 mg Intravenous Q24H Ken Dukes MD 1,000 mg (04/25/23 0500)    clonazePAM  0 5 mg Oral BID Duey Pleva, DO      enoxaparin  40 mg Subcutaneous BID Jie Oatesi, DO      OLANZapine  5 mg Intramuscular Q3H PRN Ken Dukes MD      sertraline  25 mg Oral QAM Duey Pleva, DO      sodium chloride  75 mL/hr Intravenous Continuous Jie Richards, DO 75 mL/hr (04/24/23 1033)    traZODone  50 mg Oral HS Jie Oatesi, DO      trimethobenzamide  200 mg Intramuscular Q6H PRN Jie Richards, DO       Continuous Infusions:sodium chloride, 75 mL/hr, Last Rate: 75 mL/hr (04/24/23 1033)      PRN Meds:   acetaminophen    OLANZapine    trimethobenzamide      Objective:     Blood pressure 136/71, pulse 79, temperature 98 3 °F (36 8 °C), resp   rate 16, height 5' 4\" (1 626 m), " weight 47 4 kg (104 lb 8 oz), SpO2 95 %  ,Body mass index is 17 94 kg/m²  No intake or output data in the 24 hours ending 04/25/23 0823    Invasive Devices     Peripheral Intravenous Line  Duration           Peripheral IV 04/23/23 Dorsal (posterior); Left Hand 2 days                Physical Exam:   26-year-old female, chronically ill-appearing  She is on  virtual surveillance remote monitoring  General thin, frail  She is disoriented  The patient is arousable but provides few accurate answers to questions  She needs to be redirected in her thought upon questions asked of her  She is not considered competent  Overall she does not appear to be in any distress and looks comfortable at present  Skin decreased turgor throughout, no pallor or jaundice  Extremities with muscle and fat loss noted upper and lower limbs  ENT oral mucosa is moist   Sclera white OU  Chest thin, no respiratory distress  Heart RRR  No definite murmur is heard  Lungs essentially clear, inspiratory effort was limited due to patient understanding commands  No adventitious sounds were heard  Back kyphotic, no CVA tenderness  Abdomen appears distended  Round, positive bowel sounds distant and hollow but active throughout  No ascites  Definite tympany across both quadrants of upper abdomen  No abdominal wall hernias  The abdomen is soft  She does not exhibit any peritoneal signs  Facial expression does not reveal the patient to wince with any deep palpation  Neurological exam does not reveal any facial asymmetry  No tremor noted  She is disoriented  Does not follow commands very well  Lab, Imaging and other studies:  I have personally reviewed pertinent lab results    , CBC:   Lab Results   Component Value Date    WBC 9 29 04/25/2023    HGB 11 0 (L) 04/25/2023    HCT 33 9 (L) 04/25/2023    MCV 96 04/25/2023     04/25/2023    MCH 31 3 04/25/2023    MCHC 32 4 04/25/2023    RDW 14 6 04/25/2023    MPV 9 9 04/25/2023 NRBC 0 04/25/2023   , CMP:   Lab Results   Component Value Date    SODIUM 138 04/25/2023    K 3 4 (L) 04/25/2023     04/25/2023    CO2 22 04/25/2023    BUN 13 04/25/2023    CREATININE 0 41 (L) 04/25/2023    CALCIUM 8 7 04/25/2023    AST 30 04/25/2023    ALT 17 04/25/2023    ALKPHOS 80 04/25/2023    EGFR 91 04/25/2023     VTE Pharmacologic Prophylaxis: Enoxaparin (Lovenox)  VTE Mechanical Prophylaxis: sequential compression device     Madison Knox PA-C

## 2023-04-25 NOTE — ASSESSMENT & PLAN NOTE
· CT abd pelvis showed groundglass opacities within the middle and right lower lobes    · Suspected pneumonia, given leukocytosis and elevated procalcitonin

## 2023-04-25 NOTE — ASSESSMENT & PLAN NOTE
Pt is presenting with generalized abdominal tenderness without rebound or guarding in setting of loose stools and hx of constipation  · CT abd pelvis showed fluid filled stool but no transition point which is consistent with ileus   · Further plan of care per surgical team, can likely advance diet later today    Patient had some hypoglycemia, IV fluids changed to D5 normal saline

## 2023-04-25 NOTE — PLAN OF CARE
Problem: PAIN - ADULT  Goal: Verbalizes/displays adequate comfort level or baseline comfort level  Description: Interventions:  - Encourage patient to monitor pain and request assistance  - Assess pain using appropriate pain scale  - Administer analgesics based on type and severity of pain and evaluate response  - Implement non-pharmacological measures as appropriate and evaluate response  - Consider cultural and social influences on pain and pain management  - Notify physician/advanced practitioner if interventions unsuccessful or patient reports new pain  Outcome: Progressing     Problem: INFECTION - ADULT  Goal: Absence or prevention of progression during hospitalization  Description: INTERVENTIONS:  - Assess and monitor for signs and symptoms of infection  - Monitor lab/diagnostic results  - Monitor all insertion sites, i e  indwelling lines, tubes, and drains  - Monitor endotracheal if appropriate and nasal secretions for changes in amount and color  - [place] appropriate cooling/warming therapies per order  - Administer medications as ordered  - Instruct and encourage patient and family to use good hand hygiene technique  - Identify and instruct in appropriate isolation precautions for identified infection/condition  Outcome: Progressing  Goal: Absence of fever/infection during neutropenic period  Description: INTERVENTIONS:  - Monitor WBC    Outcome: Progressing     Problem: SAFETY ADULT  Goal: Patient will remain free of falls  Description: INTERVENTIONS:  - Educate patient/family on patient safety including physical limitations  - Instruct patient to call for assistance with activity   - Consult OT/PT to assist with strengthening/mobility   - Keep Call bell within reach  - Keep bed low and locked with side rails adjusted as appropriate  - Keep care items and personal belongings within reach  - Initiate and maintain comfort rounds  - Make Fall Risk Sign visible to staff  - Offer Toileting every 2 Hours, in advance of need  - Initiate/Maintain bed/chair alarm  - Obtain necessary fall risk management equipment: bed/chair alarm, nonskid socks   - Apply yellow socks and bracelet for high fall risk patients  - Consider moving patient to room near nurses station  Outcome: Progressing  Goal: Maintain or return to baseline ADL function  Description: INTERVENTIONS:  -  Assess patient's ability to carry out ADLs; assess patient's baseline for ADL function and identify physical deficits which impact ability to perform ADLs (bathing, care of mouth/teeth, toileting, grooming, dressing, etc )  - Assess/evaluate cause of self-care deficits   - Assess range of motion  - Assess patient's mobility; develop plan if impaired  - Assess patient's need for assistive devices and provide as appropriate  - Encourage maximum independence but intervene and supervise when necessary  - Involve family in performance of ADLs  - Assess for home care needs following discharge   - Consider OT consult to assist with ADL evaluation and planning for discharge  - Provide patient education as appropriate  Outcome: Progressing  Goal: Maintains/Returns to pre admission functional level  Description: INTERVENTIONS:  - Perform BMAT or MOVE assessment daily    - Set and communicate daily mobility goal to care team and patient/family/caregiver  - Collaborate with rehabilitation services on mobility goals if consulted  - Perform Range of Motion 3 times a day  - Reposition patient every 3 hours    - Dangle patient 3 times a day  - Stand patient 3 times a day  - Ambulate patient 3 times a day  - Out of bed to chair 3 times a day   - Out of bed for meals 3 times a day  - Out of bed for toileting  - Record patient progress and toleration of activity level   Outcome: Progressing     Problem: DISCHARGE PLANNING  Goal: Discharge to home or other facility with appropriate resources  Description: INTERVENTIONS:  - Identify barriers to discharge w/patient and caregiver  - Arrange for needed discharge resources and transportation as appropriate  - Identify discharge learning needs (meds, wound care, etc )  - Arrange for interpretive services to assist at discharge as needed  - Refer to Case Management Department for coordinating discharge planning if the patient needs post-hospital services based on physician/advanced practitioner order or complex needs related to functional status, cognitive ability, or social support system  Outcome: Progressing     Problem: Knowledge Deficit  Goal: Patient/family/caregiver demonstrates understanding of disease process, treatment plan, medications, and discharge instructions  Description: Complete learning assessment and assess knowledge base  Interventions:  - Provide teaching at level of understanding  - Provide teaching via preferred learning methods  Outcome: Progressing     Problem: MOBILITY - ADULT  Goal: Maintain or return to baseline ADL function  Description: INTERVENTIONS:  -  Assess patient's ability to carry out ADLs; assess patient's baseline for ADL function and identify physical deficits which impact ability to perform ADLs (bathing, care of mouth/teeth, toileting, grooming, dressing, etc )  - Assess/evaluate cause of self-care deficits   - Assess range of motion  - Assess patient's mobility; develop plan if impaired  - Assess patient's need for assistive devices and provide as appropriate  - Encourage maximum independence but intervene and supervise when necessary  - Involve family in performance of ADLs  - Assess for home care needs following discharge   - Consider OT consult to assist with ADL evaluation and planning for discharge  - Provide patient education as appropriate  Outcome: Progressing  Goal: Maintains/Returns to pre admission functional level  Description: INTERVENTIONS:  - Perform BMAT or MOVE assessment daily    - Set and communicate daily mobility goal to care team and patient/family/caregiver     - Collaborate with rehabilitation services on mobility goals if consulted  - Perform Range of Motion 3 times a day  - Reposition patient every 3 hours  - Dangle patient 3 times a day  - Stand patient 3 times a day  - Ambulate patient 3 times a day  - Out of bed to chair 3 times a day   - Out of bed for meals 3 times a day  - Out of bed for toileting  - Record patient progress and toleration of activity level   Outcome: Progressing     Problem: Nutrition/Hydration-ADULT  Goal: Nutrient/Hydration intake appropriate for improving, restoring or maintaining nutritional needs  Description: Monitor and assess patient's nutrition/hydration status for malnutrition  Collaborate with interdisciplinary team and initiate plan and interventions as ordered  Monitor patient's weight and dietary intake as ordered or per policy  Utilize nutrition screening tool and intervene as necessary  Determine patient's food preferences and provide high-protein, high-caloric foods as appropriate       INTERVENTIONS:  - Monitor oral intake, urinary output, labs, and treatment plans  - Assess nutrition and hydration status and recommend course of action  - Evaluate amount of meals eaten  - Assist patient with eating if necessary   - Allow adequate time for meals  - Recommend/ encourage appropriate diets, oral nutritional supplements, and vitamin/mineral supplements  - Order, calculate, and assess calorie counts as needed  - Recommend, monitor, and adjust tube feedings and TPN/PPN based on assessed needs  - Assess need for intravenous fluids  - Provide specific nutrition/hydration education as appropriate  - Include patient/family/caregiver in decisions related to nutrition  Outcome: Progressing     Problem: Prexisting or High Potential for Compromised Skin Integrity  Goal: Skin integrity is maintained or improved  Description: INTERVENTIONS:  - Identify patients at risk for skin breakdown  - Assess and monitor skin integrity  - Assess and monitor nutrition and hydration status  - Monitor labs   - Assess for incontinence   - Turn and reposition patient  - Assist with mobility/ambulation  - Relieve pressure over bony prominences  - Avoid friction and shearing  - Provide appropriate hygiene as needed including keeping skin clean and dry  - Evaluate need for skin moisturizer/barrier cream  - Collaborate with interdisciplinary team   - Patient/family teaching  - Consider wound care consult   Outcome: Progressing

## 2023-04-25 NOTE — PLAN OF CARE
Problem: PAIN - ADULT  Goal: Verbalizes/displays adequate comfort level or baseline comfort level  Description: Interventions:  - Encourage patient to monitor pain and request assistance  - Assess pain using appropriate pain scale  - Administer analgesics based on type and severity of pain and evaluate response  - Implement non-pharmacological measures as appropriate and evaluate response  - Consider cultural and social influences on pain and pain management  - Notify physician/advanced practitioner if interventions unsuccessful or patient reports new pain  Outcome: Progressing     Problem: INFECTION - ADULT  Goal: Absence or prevention of progression during hospitalization  Description: INTERVENTIONS:  - Assess and monitor for signs and symptoms of infection  - Monitor lab/diagnostic results  - Monitor all insertion sites, i e  indwelling lines, tubes, and drains  - Monitor endotracheal if appropriate and nasal secretions for changes in amount and color  - [place] appropriate cooling/warming therapies per order  - Administer medications as ordered  - Instruct and encourage patient and family to use good hand hygiene technique  - Identify and instruct in appropriate isolation precautions for identified infection/condition  Outcome: Progressing  Goal: Absence of fever/infection during neutropenic period  Description: INTERVENTIONS:  - Monitor WBC    Outcome: Progressing     Problem: SAFETY ADULT  Goal: Patient will remain free of falls  Description: INTERVENTIONS:  - Educate patient/family on patient safety including physical limitations  - Instruct patient to call for assistance with activity   - Consult OT/PT to assist with strengthening/mobility   - Keep Call bell within reach  - Keep bed low and locked with side rails adjusted as appropriate  - Keep care items and personal belongings within reach  - Initiate and maintain comfort rounds  - Make Fall Risk Sign visible to staff  - Offer Toileting every 2 Hours, in advance of need  - Initiate/Maintain bed/chair alarm  - Obtain necessary fall risk management equipment: bed/chair alarm, nonskid socks   - Apply yellow socks and bracelet for high fall risk patients  - Consider moving patient to room near nurses station  Outcome: Progressing  Goal: Maintain or return to baseline ADL function  Description: INTERVENTIONS:  -  Assess patient's ability to carry out ADLs; assess patient's baseline for ADL function and identify physical deficits which impact ability to perform ADLs (bathing, care of mouth/teeth, toileting, grooming, dressing, etc )  - Assess/evaluate cause of self-care deficits   - Assess range of motion  - Assess patient's mobility; develop plan if impaired  - Assess patient's need for assistive devices and provide as appropriate  - Encourage maximum independence but intervene and supervise when necessary  - Involve family in performance of ADLs  - Assess for home care needs following discharge   - Consider OT consult to assist with ADL evaluation and planning for discharge  - Provide patient education as appropriate  Outcome: Progressing  Goal: Maintains/Returns to pre admission functional level  Description: INTERVENTIONS:  - Perform BMAT or MOVE assessment daily    - Set and communicate daily mobility goal to care team and patient/family/caregiver  - Collaborate with rehabilitation services on mobility goals if consulted  - Perform Range of Motion 3 times a day  - Reposition patient every 3 hours    - Dangle patient 3 times a day  - Stand patient 3 times a day  - Ambulate patient 3 times a day  - Out of bed to chair 3 times a day   - Out of bed for meals 3 times a day  - Out of bed for toileting  - Record patient progress and toleration of activity level   Outcome: Progressing     Problem: DISCHARGE PLANNING  Goal: Discharge to home or other facility with appropriate resources  Description: INTERVENTIONS:  - Identify barriers to discharge w/patient and caregiver  - Arrange for needed discharge resources and transportation as appropriate  - Identify discharge learning needs (meds, wound care, etc )  - Arrange for interpretive services to assist at discharge as needed  - Refer to Case Management Department for coordinating discharge planning if the patient needs post-hospital services based on physician/advanced practitioner order or complex needs related to functional status, cognitive ability, or social support system  Outcome: Progressing     Problem: Knowledge Deficit  Goal: Patient/family/caregiver demonstrates understanding of disease process, treatment plan, medications, and discharge instructions  Description: Complete learning assessment and assess knowledge base  Interventions:  - Provide teaching at level of understanding  - Provide teaching via preferred learning methods  Outcome: Progressing     Problem: MOBILITY - ADULT  Goal: Maintain or return to baseline ADL function  Description: INTERVENTIONS:  -  Assess patient's ability to carry out ADLs; assess patient's baseline for ADL function and identify physical deficits which impact ability to perform ADLs (bathing, care of mouth/teeth, toileting, grooming, dressing, etc )  - Assess/evaluate cause of self-care deficits   - Assess range of motion  - Assess patient's mobility; develop plan if impaired  - Assess patient's need for assistive devices and provide as appropriate  - Encourage maximum independence but intervene and supervise when necessary  - Involve family in performance of ADLs  - Assess for home care needs following discharge   - Consider OT consult to assist with ADL evaluation and planning for discharge  - Provide patient education as appropriate  Outcome: Progressing  Goal: Maintains/Returns to pre admission functional level  Description: INTERVENTIONS:  - Perform BMAT or MOVE assessment daily    - Set and communicate daily mobility goal to care team and patient/family/caregiver     - Collaborate with rehabilitation services on mobility goals if consulted  - Perform Range of Motion 3 times a day  - Reposition patient every 3 hours  - Dangle patient 3 times a day  - Stand patient 3 times a day  - Ambulate patient 3 times a day  - Out of bed to chair 3 times a day   - Out of bed for meals 3 times a day  - Out of bed for toileting  - Record patient progress and toleration of activity level   Outcome: Progressing     Problem: Nutrition/Hydration-ADULT  Goal: Nutrient/Hydration intake appropriate for improving, restoring or maintaining nutritional needs  Description: Monitor and assess patient's nutrition/hydration status for malnutrition  Collaborate with interdisciplinary team and initiate plan and interventions as ordered  Monitor patient's weight and dietary intake as ordered or per policy  Utilize nutrition screening tool and intervene as necessary  Determine patient's food preferences and provide high-protein, high-caloric foods as appropriate       INTERVENTIONS:  - Monitor oral intake, urinary output, labs, and treatment plans  - Assess nutrition and hydration status and recommend course of action  - Evaluate amount of meals eaten  - Assist patient with eating if necessary   - Allow adequate time for meals  - Recommend/ encourage appropriate diets, oral nutritional supplements, and vitamin/mineral supplements  - Order, calculate, and assess calorie counts as needed  - Recommend, monitor, and adjust tube feedings and TPN/PPN based on assessed needs  - Assess need for intravenous fluids  - Provide specific nutrition/hydration education as appropriate  - Include patient/family/caregiver in decisions related to nutrition  Outcome: Progressing     Problem: Prexisting or High Potential for Compromised Skin Integrity  Goal: Skin integrity is maintained or improved  Description: INTERVENTIONS:  - Identify patients at risk for skin breakdown  - Assess and monitor skin integrity  - Assess and monitor nutrition and hydration status  - Monitor labs   - Assess for incontinence   - Turn and reposition patient  - Assist with mobility/ambulation  - Relieve pressure over bony prominences  - Avoid friction and shearing  - Provide appropriate hygiene as needed including keeping skin clean and dry  - Evaluate need for skin moisturizer/barrier cream  - Collaborate with interdisciplinary team   - Patient/family teaching  - Consider wound care consult   Outcome: Progressing

## 2023-04-25 NOTE — PROGRESS NOTES
[address] [id number]  Progress Note  Name: Tesfaye Paniagua  MRN: [id number]  Unit/Bed#: 064-91 I Date of Admission: 4/23/2023   Date of Service: 4/25/2023 I Hospital Day: 2    Assessment/Plan   * Ileus [facility])  Assessment & Plan  Pt is presenting with generalized abdominal tenderness without rebound or guarding in setting of loose stools and hx of constipation  · CT abd pelvis showed fluid filled stool but no transition point which is consistent with ileus   · Further plan of care per surgical team, can likely advance diet later today    Patient had some hypoglycemia, IV fluids changed to D5 normal saline    Pneumonia due to infectious organism  Assessment & Plan  Continue antibiotics    Repeat a m  labs, respiratory status stable  Abnormal CT of the abdomen  Assessment & Plan  · CT abd pelvis showed groundglass opacities within the middle and right lower lobes    · Suspected pneumonia, given leukocytosis and elevated procalcitonin        Primary hypertension  Assessment & Plan    Asymptomatic  Continue home amlodipine 5mg daily    Constipated  Assessment & Plan    Continue to monitor, hold MiraLAX in the setting of ileus    Dementia ([facility] [address] )  Assessment & Plan  Alert and oriented x1  Continue home regimen    GERD (gastroesophageal reflux disease)  Assessment & Plan  Continue protonix 40mg daily (hospital formulary for prilosec 20mg daily)    Anxiety  Assessment & Plan  Continue klonopin 0 5mg BID, zoloft 25mg daily    Moderate protein-calorie malnutrition (HCC)  Assessment & Plan  Malnutrition Findings:   Adult Malnutrition type: Chronic illness  Adult Degree of Malnutrition: Malnutrition of moderate degree  Malnutrition Characteristics: Fat loss, Muscle loss                  360 Statement: Chronic, moderate protein-calorie malnutrition related to chronic illness as evidenced by mild muscle wasting to clavicle and temple regions, mild subcutaneous fat loss to orbitals, and less than desirable BMI  Patient currently NPO-will monitor diet advancement and initiate nutrition supplement if appropriate  BMI Findings:  Adult BMI Classifications: Underweight < 18 5        Body mass index is 17 94 kg/m²  Code Status: Level 3 - DNAR and DNI    Subjective:   Patient is not a reliable historian due to underlying dementia    Objective:     Vitals:   Temp (24hrs), Av 8 °F (37 1 °C), Min:98 3 °F (36 8 °C), Max:99 2 °F (37 3 °C)    Temp:  [98 3 °F (36 8 °C)-99 2 °F (37 3 °C)] 98 3 °F (36 8 °C)  HR:  [] 79  Resp:  [16] 16  BP: (125-171)/(71-75) 136/71  SpO2:  [93 %-95 %] 95 %  Body mass index is 17 94 kg/m²  Input and Output Summary (last 24 hours):   No intake or output data in the 24 hours ending 23 1125    Physical Exam:   Physical Exam  Vitals and nursing note reviewed  Constitutional:       General: She is not in acute distress  HENT:      Head: Normocephalic  Cardiovascular:      Pulses: Normal pulses  Pulmonary:      Effort: Pulmonary effort is normal    Abdominal:      General: There is no distension  Tenderness: There is no abdominal tenderness  There is no guarding or rebound  Neurological:      Mental Status: Mental status is at baseline            Additional Data:     Labs:  Results from last 7 days   Lab Units 23  0501 23  0441   WBC Thousand/uL 9 29 15 77*   HEMOGLOBIN g/dL 11 0* 11 7   HEMATOCRIT % 33 9* 35 2   PLATELETS Thousands/uL 188 198   BANDS PCT %  --  4   NEUTROS PCT % 86*  --    LYMPHS PCT % 7*  --    LYMPHO PCT %  --  3*   MONOS PCT % 7  --    MONO PCT %  --  6   EOS PCT % 0 0     Results from last 7 days   Lab Units 23  0501   SODIUM mmol/L 138   POTASSIUM mmol/L 3 4*   CHLORIDE mmol/L 103   CO2 mmol/L 22   BUN mg/dL 13   CREATININE mg/dL 0 41*   ANION GAP mmol/L 13   CALCIUM mg/dL 8 7   ALBUMIN g/dL 3 5   TOTAL BILIRUBIN mg/dL 0 54   ALK PHOS U/L 80   ALT U/L 17   AST U/L 30   GLUCOSE RANDOM mg/dL 61*     Results from last 7 days Lab Units 04/25/23  0501   INR  0 96             Results from last 7 days   Lab Units 04/25/23  0501 04/24/23  0441 04/23/23  0257   PROCALCITONIN ng/ml 0 55* 0 87* 0 05       Lines/Drains:  Invasive Devices     Peripheral Intravenous Line  Duration           Peripheral IV 04/23/23 Dorsal (posterior); Left Hand 2 days                      Imaging: No pertinent imaging reviewed  Recent Cultures (last 7 days):   Results from last 7 days   Lab Units 04/23/23  0540   BLOOD CULTURE  No Growth at 24 hrs  No Growth at 24 hrs  Last 24 Hours Medication List:   Current Facility-Administered Medications   Medication Dose Route Frequency Provider Last Rate    acetaminophen  650 mg Oral Q6H PRN Danielle Aponte, DO      amLODIPine  5 mg Oral Daily Jie Richards, DO      cefTRIAXone  1,000 mg Intravenous Q24H Ken Chapin MD 1,000 mg (04/25/23 0500)    clonazePAM  0 5 mg Oral BID Jie Richards, DO      dextrose 5 % and sodium chloride 0 9 %  75 mL/hr Intravenous Continuous Derrick Montoya PA-C 75 mL/hr (04/25/23 0910)    enoxaparin  40 mg Subcutaneous BID Jie Richards, DO      OLANZapine  5 mg Intramuscular Q3H PRN Ken Chapin MD      sertraline  25 mg Oral QAM Danielle Aponte DO      traZODone  50 mg Oral HS Jie Richards, DO      trimethobenzamide  200 mg Intramuscular Q6H PRN Danielle Aponte DO          Today, Patient Was Seen By: Augustus Schaeffer DO    **Please Note: This note may have been constructed using a voice recognition system  **

## 2023-04-25 NOTE — ASSESSMENT & PLAN NOTE
Malnutrition Findings:   Adult Malnutrition type: Chronic illness  Adult Degree of Malnutrition: Malnutrition of moderate degree  Malnutrition Characteristics: Fat loss, Muscle loss                  360 Statement: Chronic, moderate protein-calorie malnutrition related to chronic illness as evidenced by mild muscle wasting to clavicle and temple regions, mild subcutaneous fat loss to orbitals, and less than desirable BMI  Patient currently NPO-will monitor diet advancement and initiate nutrition supplement if appropriate  BMI Findings:  Adult BMI Classifications: Underweight < 18 5        Body mass index is 17 94 kg/m²

## 2023-04-25 NOTE — QUICK NOTE
Abdominal obstruction series was performed this morning, personally reviewed with Dr Jose Fuentes  Patient still with dilated loops of small bowel and distended transverse colon  No obvious transition point, most likely persistent ileus  Patient has not had a bowel movement since prior to admission  No further nausea or vomiting  She has active bowel sounds  Abdomen is tympanic across the upper quadrants  Soft, no tenderness is elicited      Advance clear liquids as tolerated  Speech consultation for swallowing evaluation, patient with suspected aspiration pneumonia  Elevate head of bed 30 degrees Semi-Alejo position  Aspiration precautions in place  Dulcolax suppository ordered one-time today    Vernetta Peabody, PA-C

## 2023-04-25 NOTE — CASE MANAGEMENT
Case Management Progress Note    Patient name Magnolia Felder  Location /196-40 MRN [id number]  : 10/6/1933 Date 2023       LOS (days): 2  Geometric Mean LOS (GMLOS) (days): 4 60  Days to GMLOS:2 2        OBJECTIVE:        Current admission status: Inpatient  Preferred Pharmacy:   [address], [facility] [place] [address]  Phone: [phone] Fax: [phone]    [facility]/pharmacy #[id number]- [address], [address] [facility]  [address] [facility]  [address] [facility]   Phone: [phone] Fax: [phone]    Primary Care Provider: Dano Shelley MD    Primary Insurance: MEDICARE  Secondary Insurance: BLUE CROSS    PROGRESS NOTE:Spoke with jany Rivera regarding dc plan  She would like pt to return back to [facility] and not go to [facility]  Therapy will continue working with her and will update [place] closer to dc to see if they would need to eval her again

## 2023-04-25 NOTE — PLAN OF CARE
Problem: PHYSICAL THERAPY ADULT  Goal: Performs mobility at highest level of function for planned discharge setting  See evaluation for individualized goals  Description: Treatment/Interventions: Functional transfer training, LE strengthening/ROM, Therapeutic exercise, Endurance training, Bed mobility, Gait training          See flowsheet documentation for full assessment, interventions and recommendations  4/25/2023 1041 by Regan Marina, ALLEN  Outcome: Progressing  Note: Prognosis: Good  Problem List:  (Decreased strength; Decreased endurance; Impaired balance; Decreased mobility; Decreased cognition; Impaired judgement; Decreased safety awareness)  Assessment: Pt  seen for PT treatment session this date with interventions consisting of  therapeutic exercises and bed mobility w/ emphasis on improving pt's strength,endurance and functional mobility  Pt  Requiring max cues for sequence and safety, however, unable to follow well due to cognitive status    In comparison to previous session, Pt  With no change in activity tolerance  Pt is in need of continued activity in PT to improve strength balance endurance mobility transfers and ambulation with return to maximize LOF  From PT/mobility standpoint, recommendation at time of d/c would be post acutre rehab  in order to promote return to PLOF and independence  The patient's -[facility] Basic Mobility Inpatient Short Form Raw Score is 6  A Raw score of less than or equal to 16 suggests the patient may benefit from discharge to post-acute rehabilitation services  Please also refer to physical therapy recommendation for safe DC planning  PT Discharge Recommendation: Post acute rehabilitation services    See flowsheet documentation for full assessment  4/25/2023 1041 by Regan Marina, ALLEN  Outcome: Not Progressing  Note: Prognosis: Good  Problem List:  (Decreased strength; Decreased endurance; Impaired balance; Decreased mobility;  Decreased cognition; Impaired judgement; Decreased safety awareness)  Assessment: Pt  seen for PT treatment session this date with interventions consisting of  therapeutic exercises and bed mobility w/ emphasis on improving pt's strength,endurance and functional mobility  Pt  Requiring max cues for sequence and safety, however, unable to follow well due to cognitive status    In comparison to previous session, Pt  With no change in activity tolerance  Pt is in need of continued activity in PT to improve strength balance endurance mobility transfers and ambulation with return to maximize LOF  From PT/mobility standpoint, recommendation at time of d/c would be post acutre rehab  in order to promote return to PLOF and independence  The patient's AM-PAC Basic Mobility Inpatient Short Form Raw Score is 6  A Raw score of less than or equal to 16 suggests the patient may benefit from discharge to post-acute rehabilitation services  Please also refer to physical therapy recommendation for safe DC planning  PT Discharge Recommendation: Post acute rehabilitation services    See flowsheet documentation for full assessment  4/25/2023 1040 by Eric Meyers PTA  Outcome: Progressing  Note: Prognosis: Good  Problem List:  (Decreased strength; Decreased endurance; Impaired balance; Decreased mobility; Decreased cognition; Impaired judgement; Decreased safety awareness)  Assessment: Pt  seen for PT treatment session this date with interventions consisting of  therapeutic exercises and bed mobility w/ emphasis on improving pt's strength,endurance and functional mobility  Pt  Requiring max cues for sequence and safety, however, unable to follow well due to cognitive status    In comparison to previous session, Pt  With no change in activity tolerance  Pt is in need of continued activity in PT to improve strength balance endurance mobility transfers and ambulation with return to maximize LOF   From PT/mobility standpoint, recommendation at time of d/c would be post acutre rehab  in order to promote return to PLOF and independence  The patient's AM-PAC Basic Mobility Inpatient Short Form Raw Score is 6  A Raw score of less than or equal to 16 suggests the patient may benefit from discharge to post-acute rehabilitation services  Please also refer to physical therapy recommendation for safe DC planning  PT Discharge Recommendation: Post acute rehabilitation services    See flowsheet documentation for full assessment

## 2023-04-25 NOTE — TREATMENT PLAN
25-year-old with underlying dementia who was admitted with ileus, pneumonia was noted by nursing to appear unwell  An EKG was obtained and the patient was found to have A-fib with RVR with no prior history of atrial fibrillation  Heart rate initially ranging from 190-210 bpm   Patient's heart rate initially mildly improved with metoprolol 5 mg IV to 160s  The patient has not been agreeing to take any oral medication since last night    · Transfer to ICU  · We will give the 10 mg IV bolus, initiated on diltiazem drip  · [id number] score of 4, anticoagulation is indicated  · Initiate heparin drip  · 2D echo ordered  · Request Cardiology evaluation  · BULMARO Colby updated    Addendum:  Following Cardizem bolus the patient converted to normal sinus rhythm  Follow-up EKG showed possible anteroseptal infarct, however, when compared to recent EKG it appears similar to prior  Patient is a poor historian due to her dementia, she denies chest pain  We will proceed with serial troponin although likely it will be elevated given rapid A-fib  She is initiated on heparin drip given elevated [id number] score and new onset A-fib

## 2023-04-25 NOTE — PHYSICAL THERAPY NOTE
PHYSICAL THERAPY NOTE          Patient Name: Michel MCGEE Date: 4/25/2023 04/25/23 0804   PT Last Visit   PT Visit Date 04/25/23   Note Type   Note Type Treatment   Pain Assessment   Pain Assessment Tool FLACC   Pain Rating: FLACC (Rest) - Face 0   Pain Rating: FLACC (Rest) - Legs 0   Pain Rating: FLACC (Rest) - Activity 0   Pain Rating: FLACC (Rest) - Cry 0   Pain Rating: FLACC (Rest) - Consolability 0   Score: FLACC (Rest) 0   Pain Rating: FLACC (Activity) - Face 1   Pain Rating: FLACC (Activity) - Legs 1   Pain Rating: FLACC (Activity) - Activity 1   Pain Rating: FLACC (Activity) - Cry 0   Pain Rating: FLACC (Activity) - Consolability 1   Score: FLACC (Activity) 4   Restrictions/Precautions   Weight Bearing Precautions Per Order No   Other Precautions   (Impulsive; Bed Alarm; Fall Risk; Multiple lines)   General   Response to Previous Treatment Patient unable to report, no changes reported from family or staff   Family/Caregiver Present No   Cognition   Overall Cognitive Status Impaired   Arousal/Participation Responsive   Attention Difficulty attending to directions   Orientation Level Oriented to person;Disoriented to place; Disoriented to situation;Disoriented to time   Subjective   Subjective Pt confused  States she needs to go to the bathroom  Bed Mobility   Rolling R 3  Moderate assistance   Additional items Assist x 2;Bedrails; Increased time required;Verbal cues;LE management   Rolling L 3  Moderate assistance   Additional items Assist x 2;Bedrails; Increased time required;Verbal cues   Supine to Sit 2  Maximal assistance   Additional items Assist x 1;HOB elevated   Additional Comments Resists transfer to EOB  Pushes herself back onto the bed  Pt Unsafe to transfer to [facility]  Returned to bed  Pt incontinent  dependent for hygiene and changed bed pads     Transfers   Sit to Stand Unable to assess   Stand to Sit Unable to assess   Ambulation/Elevation   Gait pattern Not appropriate; Not tested   Balance   Static Sitting Poor -   Dynamic Sitting Poor -   Endurance Deficit   Endurance Deficit Yes   Activity Tolerance   Activity Tolerance Treatment limited secondary to agitation  (cognitive status)   Exercises   Heelslides Supine;20 reps;AAROM; Bilateral;PROM   Hip Abduction Supine;20 reps;AAROM;PROM   Hip Adduction Supine;20 reps;PROM;AAROM; Bilateral   Ankle Pumps Supine;20 reps;PROM; Bilateral   Assessment   Prognosis Good   Problem List   (Decreased strength; Decreased endurance; Impaired balance; Decreased mobility; Decreased cognition; Impaired judgement; Decreased safety awareness)   Assessment Pt  seen for PT treatment session this date with interventions consisting of  therapeutic exercises and bed mobility w/ emphasis on improving pt's strength,endurance and functional mobility  Pt  Requiring max cues for sequence and safety, however, unable to follow well due to cognitive status    In comparison to previous session, Pt  With no change in activity tolerance  Pt is in need of continued activity in PT to improve strength balance endurance mobility transfers and ambulation with return to maximize LOF  From PT/mobility standpoint, recommendation at time of d/c would be post acutre rehab  in order to promote return to PLOF and independence  The patient's AM-PAC Basic Mobility Inpatient Short Form Raw Score is 6  A Raw score of less than or equal to 16 suggests the patient may benefit from discharge to post-acute rehabilitation services  Please also refer to physical therapy recommendation for safe DC planning  Goals   LTG Expiration Date 05/08/23   Plan   Treatment/Interventions   (Functional transfer training; LE strengthening/ROM; Therapeutic exercise;  Endurance training; Bed mobility; Gait training)   Progress Slow progress, cognitive deficits   PT Frequency 3-5x/wk   Recommendation   PT Discharge Recommendation Post acute rehabilitation services   AM-PAC Basic Mobility Inpatient   Turning in Flat Bed Without Bedrails 1   Lying on Back to Sitting on Edge of Flat Bed Without Bedrails 1   Moving Bed to Chair 1   Standing Up From Chair Using Arms 1   Walk in Room 1   Climb 3-5 Stairs With Railing 1   Basic Mobility Inpatient Raw Score 6   Turning Head Towards Sound 2   Follow Simple Instructions 1   Low Function Basic Mobility Raw Score  9   Low Function Basic Mobility Standardized Score  12 55   Highest Level Of Mobility   JH-HLM Goal 2: Bed activities/Dependent transfer   -HL Achieved 2: Bed activities/Dependent transfer   End of Consult   Patient Position at End of Consult Supine;Bed/Chair alarm activated; All needs within reach   End of Consult Comments discussed POC with PT

## 2023-04-26 ENCOUNTER — APPOINTMENT (INPATIENT)
Dept: CT IMAGING | Facility: HOSPITAL | Age: 88
End: 2023-04-26

## 2023-04-26 ENCOUNTER — APPOINTMENT (INPATIENT)
Dept: NON INVASIVE DIAGNOSTICS | Facility: HOSPITAL | Age: 88
End: 2023-04-26

## 2023-04-26 LAB
ALBUMIN SERPL BCP-MCNC: 2.8 G/DL (ref 3.5–5)
ALP SERPL-CCNC: 62 U/L (ref 34–104)
ALT SERPL W P-5'-P-CCNC: 14 U/L (ref 7–52)
ANION GAP SERPL CALCULATED.3IONS-SCNC: 6 MMOL/L (ref 4–13)
AORTIC ROOT: 2.5 CM
APTT PPP: 80 SECONDS (ref 23–37)
AST SERPL W P-5'-P-CCNC: 22 U/L (ref 13–39)
BACTERIA UR QL AUTO: NORMAL /HPF
BASOPHILS # BLD AUTO: 0.02 THOUSANDS/ΜL (ref 0–0.1)
BASOPHILS NFR BLD AUTO: 0 % (ref 0–1)
BILIRUB SERPL-MCNC: 0.35 MG/DL (ref 0.2–1)
BILIRUB UR QL STRIP: NEGATIVE
BUN SERPL-MCNC: 15 MG/DL (ref 5–25)
CALCIUM ALBUM COR SERPL-MCNC: 9 MG/DL (ref 8.3–10.1)
CALCIUM SERPL-MCNC: 8 MG/DL (ref 8.4–10.2)
CARDIAC TROPONIN I PNL SERPL HS: 50 NG/L (ref 8–18)
CHLORIDE SERPL-SCNC: 109 MMOL/L (ref 96–108)
CLARITY UR: CLEAR
CO2 SERPL-SCNC: 23 MMOL/L (ref 21–32)
COLOR UR: YELLOW
CREAT SERPL-MCNC: 0.44 MG/DL (ref 0.6–1.3)
E WAVE DECELERATION TIME: 175 MS
EOSINOPHIL # BLD AUTO: 0.03 THOUSAND/ΜL (ref 0–0.61)
EOSINOPHIL NFR BLD AUTO: 1 % (ref 0–6)
ERYTHROCYTE [DISTWIDTH] IN BLOOD BY AUTOMATED COUNT: 14.5 % (ref 11.6–15.1)
FRACTIONAL SHORTENING: 20 % (ref 28–44)
GFR SERPL CREATININE-BSD FRML MDRD: 89 ML/MIN/1.73SQ M
GLUCOSE SERPL-MCNC: 115 MG/DL (ref 65–140)
GLUCOSE UR STRIP-MCNC: NEGATIVE MG/DL
HCT VFR BLD AUTO: 29.1 % (ref 34.8–46.1)
HGB BLD-MCNC: 9.7 G/DL (ref 11.5–15.4)
HGB UR QL STRIP.AUTO: NEGATIVE
IMM GRANULOCYTES # BLD AUTO: 0.02 THOUSAND/UL (ref 0–0.2)
IMM GRANULOCYTES NFR BLD AUTO: 0 % (ref 0–2)
INR PPP: 1.05 (ref 0.84–1.19)
INTERVENTRICULAR SEPTUM IN DIASTOLE (PARASTERNAL SHORT AXIS VIEW): 1 CM
INTERVENTRICULAR SEPTUM: 1 CM (ref 0.6–1.1)
KETONES UR STRIP-MCNC: ABNORMAL MG/DL
LAAS-AP2: 11.3 CM2
LAAS-AP4: 11.3 CM2
LEFT ATRIUM SIZE: 3 CM
LEFT INTERNAL DIMENSION IN SYSTOLE: 2.8 CM (ref 2.1–4)
LEFT VENTRICULAR INTERNAL DIMENSION IN DIASTOLE: 3.5 CM (ref 3.5–6)
LEFT VENTRICULAR POSTERIOR WALL IN END DIASTOLE: 0.9 CM
LEFT VENTRICULAR STROKE VOLUME: 23 ML
LEUKOCYTE ESTERASE UR QL STRIP: NEGATIVE
LVSV (TEICH): 23 ML
LYMPHOCYTES # BLD AUTO: 0.79 THOUSANDS/ΜL (ref 0.6–4.47)
LYMPHOCYTES NFR BLD AUTO: 13 % (ref 14–44)
MAGNESIUM SERPL-MCNC: 2 MG/DL (ref 1.9–2.7)
MCH RBC QN AUTO: 31.7 PG (ref 26.8–34.3)
MCHC RBC AUTO-ENTMCNC: 33.3 G/DL (ref 31.4–37.4)
MCV RBC AUTO: 95 FL (ref 82–98)
MONOCYTES # BLD AUTO: 0.68 THOUSAND/ΜL (ref 0.17–1.22)
MONOCYTES NFR BLD AUTO: 11 % (ref 4–12)
MRSA NOSE QL CULT: NORMAL
MV E'TISSUE VEL-SEP: 7 CM/S
MV PEAK A VEL: 0.87 M/S
MV PEAK E VEL: 76 CM/S
NEUTROPHILS # BLD AUTO: 4.46 THOUSANDS/ΜL (ref 1.85–7.62)
NEUTS SEG NFR BLD AUTO: 75 % (ref 43–75)
NITRITE UR QL STRIP: NEGATIVE
NON-SQ EPI CELLS URNS QL MICRO: NORMAL /HPF
NRBC BLD AUTO-RTO: 0 /100 WBCS
PH UR STRIP.AUTO: 7 [PH]
PHOSPHATE SERPL-MCNC: 2 MG/DL (ref 2.3–4.1)
PLATELET # BLD AUTO: 161 THOUSANDS/UL (ref 149–390)
PMV BLD AUTO: 10.1 FL (ref 8.9–12.7)
POTASSIUM SERPL-SCNC: 3 MMOL/L (ref 3.5–5.3)
PROCALCITONIN SERPL-MCNC: 0.72 NG/ML
PROT SERPL-MCNC: 5.2 G/DL (ref 6.4–8.4)
PROT UR STRIP-MCNC: ABNORMAL MG/DL
PROTHROMBIN TIME: 14 SECONDS (ref 11.6–14.5)
RA PRESSURE ESTIMATED: 8 MMHG
RBC # BLD AUTO: 3.06 MILLION/UL (ref 3.81–5.12)
RBC #/AREA URNS AUTO: NORMAL /HPF
RIGHT ATRIUM AREA SYSTOLE A4C: 12.5 CM2
RV PSP: 36 MMHG
SL CV LEFT ATRIUM LENGTH A2C: 3.3 CM
SL CV LV EF: 50
SL CV PED ECHO LEFT VENTRICLE DIASTOLIC VOLUME (MOD BIPLANE) 2D: 52 ML
SL CV PED ECHO LEFT VENTRICLE SYSTOLIC VOLUME (MOD BIPLANE) 2D: 28 ML
SODIUM SERPL-SCNC: 138 MMOL/L (ref 135–147)
SP GR UR STRIP.AUTO: 1.02 (ref 1–1.03)
TR MAX PG: 28 MMHG
TR PEAK VELOCITY: 2.7 M/S
TRICUSPID ANNULAR PLANE SYSTOLIC EXCURSION: 1.9 CM
TRICUSPID VALVE PEAK REGURGITATION VELOCITY: 2.67 M/S
UROBILINOGEN UR QL STRIP.AUTO: 0.2 E.U./DL
WBC # BLD AUTO: 6 THOUSAND/UL (ref 4.31–10.16)
WBC #/AREA URNS AUTO: NORMAL /HPF

## 2023-04-26 RX ORDER — BISACODYL 10 MG
10 SUPPOSITORY, RECTAL RECTAL DAILY PRN
Status: DISCONTINUED | OUTPATIENT
Start: 2023-04-26 | End: 2023-04-28 | Stop reason: HOSPADM

## 2023-04-26 RX ORDER — ATORVASTATIN CALCIUM 40 MG/1
40 TABLET, FILM COATED ORAL
Status: DISCONTINUED | OUTPATIENT
Start: 2023-04-26 | End: 2023-04-26

## 2023-04-26 RX ORDER — ASPIRIN 81 MG/1
81 TABLET, CHEWABLE ORAL DAILY
Status: DISCONTINUED | OUTPATIENT
Start: 2023-04-26 | End: 2023-04-26

## 2023-04-26 RX ORDER — POLYETHYLENE GLYCOL 3350 17 G/17G
17 POWDER, FOR SOLUTION ORAL DAILY
Status: DISCONTINUED | OUTPATIENT
Start: 2023-04-26 | End: 2023-04-27

## 2023-04-26 RX ORDER — POTASSIUM CHLORIDE 14.9 MG/ML
20 INJECTION INTRAVENOUS ONCE
Status: COMPLETED | OUTPATIENT
Start: 2023-04-26 | End: 2023-04-26

## 2023-04-26 RX ADMIN — APIXABAN 2.5 MG: 2.5 TABLET, FILM COATED ORAL at 14:31

## 2023-04-26 RX ADMIN — AMLODIPINE BESYLATE 5 MG: 5 TABLET ORAL at 08:46

## 2023-04-26 RX ADMIN — TRAZODONE HYDROCHLORIDE 50 MG: 50 TABLET ORAL at 21:20

## 2023-04-26 RX ADMIN — ASPIRIN 81 MG CHEWABLE TABLET 81 MG: 81 TABLET CHEWABLE at 12:32

## 2023-04-26 RX ADMIN — POTASSIUM PHOSPHATE, MONOBASIC AND POTASSIUM PHOSPHATE, DIBASIC 12 MMOL: 224; 236 INJECTION, SOLUTION, CONCENTRATE INTRAVENOUS at 12:30

## 2023-04-26 RX ADMIN — SERTRALINE 25 MG: 25 TABLET, FILM COATED ORAL at 08:46

## 2023-04-26 RX ADMIN — CEFTRIAXONE 1000 MG: 1 INJECTION, SOLUTION INTRAVENOUS at 05:32

## 2023-04-26 RX ADMIN — CLONAZEPAM 0.5 MG: 0.5 TABLET ORAL at 17:14

## 2023-04-26 RX ADMIN — POLYETHYLENE GLYCOL 3350 17 G: 17 POWDER, FOR SOLUTION ORAL at 12:32

## 2023-04-26 RX ADMIN — CLONAZEPAM 0.5 MG: 0.5 TABLET ORAL at 08:46

## 2023-04-26 RX ADMIN — POTASSIUM CHLORIDE 20 MEQ: 14.9 INJECTION, SOLUTION INTRAVENOUS at 10:17

## 2023-04-26 RX ADMIN — Medication 12.5 MG: at 21:20

## 2023-04-26 RX ADMIN — Medication 12.5 MG: at 10:17

## 2023-04-26 NOTE — QUICK NOTE
Recommendations from speech pathologist upon swallowing evaluation for the patient reviewed  Patient now advanced to surgical dysphagia to mechanical soft, low fiber/low residual, nectar thick liquid diet  The patient had a bowel movement yesterday after one-time Dulcolax suppository  Discussed in person with Dr Merle Ayon who recommends the patient now be placed on a bowel regimen for either Colace orally or MiraLAX  Discussed with the patient's nurse in CCU, all pills given to the patient needed to be crushed and Colace only comes in a gel pill and not and available in liquid  The patient will now be started on MiraLAX daily in liquid form      Guille Bond PA-C

## 2023-04-26 NOTE — CONSULTS
Consultation - Cardiology   Tia Fontaine 80 y o  female MRN: [id number]  Unit/Bed#:  Encounter: [id number]    Inpatient consult to Cardiology  Consult performed by: Nicki Parker PA-C  Consult ordered by: Elinor Sherwood MD            Physician Requesting Consult: Katie Santiago DO  Reason for Consult / Principal Problem: atrial fibrillation with RVR    Assessment/Plan:  1  Paroxysmal atrial fibrillation with RVR   - developed atrial fibrillation with RVR yesterday afternoon - telemetry was placed once patient was found to be tachycardic  15 minutes of atrial fibrillation were noted on telemetry from that period forward   - atrial fibrillation likely occurred in the setting of ileus/pneumonia/NPO status   - she converted to sinus rhythm after receiving IV diltiazem and has been maintaining sinus rhythm since   - will initiate low dose metoprolol tartrate 12 5 mg BID   - [id number] score = 4 (age+2, sex, HTN) - she was initiated on IV heparin   - discussed risks/benefits of anticoagulation with patient and she was unsure - will call patient's POA for further discussion   - an echocardiogram has been ordered which will be reviewed   - potassium was 3 0 this AM - replace  IV potassium has been ordered   - continue telemetry monitoring    2  Ileus   - patient presented with abdominal pain and vomiting, imaging with findings consistent with ileus   - has been treated conservatively per the primary team and general surgery   - diet was advanced yesterday    3  Pneumonia, likely secondary to aspiration   - with imaging findings of pneumonia as well as elevated procalcitonin levels   - being treated with antibiotics per the primary team    4   Hypertension    - currently controlled   - watch with addition of metoprolol - may need to discontinue amlodipine    History of Present Illness   HPI: Tia Fontaine is a 80y o  year old female with hypertension and dementia without any past cardiac history  The following history is taken mainly from chart review as the patient is a poor historian given her underlying dementia  The patient presented to the ER on 4/23 from [address] with complaints of abdominal pain and vomiting  She was found to have an ileus  CXR also suggested aspiration  She was made NPO and has been initiated on antibiotic therapy  Family declined NG tube  Per chart review, yesterday apparently patient appeared unwell and was tachycardic  She was apparently in atrial fibrillation with RVR  I do not have an EKG to confirm this but telemetry review does show about 15 minutes of what appears to be atrial fibrillation with rapid rates  She was given IV cardizem and converted to sinus rhythm  Given elevated [id number] score, anticoagulation was initiated with IV heparin  An echocardiogram was ordered  Cardiology was consulted for further evaluation and management  At bedside today patient reports feeling well  She no longer has abdominal pain  Per chart review, patient has no past cardiac history  Prior EKG's have shown sinus rhythm without evidence of atrial fibrillation  Review of Systems   All other systems reviewed and are negative      Historical Information   Past Medical History:   Diagnosis Date    Dysphagia     GERD (gastroesophageal reflux disease)      Past Surgical History:   Procedure Laterality Date    CATARACT EXTRACTION Bilateral     [facility] area    CHOLECYSTECTOMY  2015    EGD      x2 and was dilated    HYSTERECTOMY       Social History     Substance and Sexual Activity   Alcohol Use Not Currently     Social History     Substance and Sexual Activity   Drug Use Never     Social History     Tobacco Use   Smoking Status Never   Smokeless Tobacco Never     Family History: non-contributory    Meds/Allergies   all current active meds have been reviewed  dextrose 5 % and sodium chloride 0 9 %, 75 mL/hr, Last Rate: 75 mL/hr (04/25/23 "2246)  diltiazem, 1-15 mg/hr, Last Rate: Stopped (04/25/23 1702)  heparin (porcine), 3-20 Units/kg/hr (Order-Specific), Last Rate: 12 Units/kg/hr (04/25/23 1658)        No Known Allergies    Objective   Vitals: Blood pressure 130/58, pulse 71, temperature 98 1 °F (36 7 °C), temperature source Temporal, resp  rate 15, height 5' 4\" (1 626 m), weight 47 2 kg (104 lb), SpO2 100 %  , Body mass index is 17 85 kg/m² ,   Orthostatic Blood Pressures    Flowsheet Row Most Recent Value   Blood Pressure 130/58 filed at 04/26/2023 0901   Patient Position - Orthostatic VS Lying filed at 04/26/2023 9563        Systolic (54BAB), USR:617 , Min:95 , UCT:483     Diastolic (41CJP), MWH:94, Min:51, Max:114      Intake/Output Summary (Last 24 hours) at 4/26/2023 0935  Last data filed at 4/25/2023 1837  Gross per 24 hour   Intake 931 41 ml   Output --   Net 931 41 ml       Weight (last 2 days)     Date/Time Weight    04/26/23 0901 47 2 (104)          Invasive Devices     Peripheral Intravenous Line  Duration           Peripheral IV 04/25/23 Left;Ventral (anterior) Forearm <1 day    Peripheral IV 04/25/23 Right;Ventral (anterior) Forearm <1 day    Peripheral IV 04/25/23 Right;Ventral (anterior) Forearm <1 day          Drain  Duration           External Urinary Catheter <1 day                  Physical Exam  Vitals reviewed  Constitutional:       General: She is not in acute distress  Appearance: She is not diaphoretic  Interventions: Nasal cannula in place  HENT:      Head: Normocephalic and atraumatic  Eyes:      Pupils: Pupils are equal, round, and reactive to light  Neck:      Vascular: No carotid bruit  Cardiovascular:      Rate and Rhythm: Normal rate and regular rhythm  Pulses:           Radial pulses are 2+ on the right side and 2+ on the left side  Heart sounds: S1 normal and S2 normal  No murmur heard  Pulmonary:      Effort: Pulmonary effort is normal  No respiratory distress        Breath sounds: " Normal breath sounds  No wheezing or rales  Abdominal:      General: There is no distension  Palpations: Abdomen is soft  Tenderness: There is no abdominal tenderness  Musculoskeletal:         General: No deformity  Normal range of motion  Cervical back: Normal range of motion  Right lower leg: No edema  Left lower leg: No edema  Skin:     General: Skin is warm and dry  Findings: No erythema  Neurological:      General: No focal deficit present  Mental Status: She is alert and oriented to person, place, and time     Psychiatric:         Mood and Affect: Mood normal          Behavior: Behavior normal              Laboratory Results:        CBC with diff:   Results from last 7 days   Lab Units 04/26/23  0535 04/25/23  1615 04/25/23  0501 04/24/23  0441 04/23/23  0257   WBC Thousand/uL 6 00 11 60* 9 29 15 77* 10 12   HEMOGLOBIN g/dL 9 7* 11 8 11 0* 11 7 13 2   HEMATOCRIT % 29 1* 35 1 33 9* 35 2 39 9   MCV fL 95 95 96 96 98   PLATELETS Thousands/uL 161 206 188 198 261   MCH pg 31 7 31 9 31 3 32 1 32 4   MCHC g/dL 33 3 33 6 32 4 33 2 33 1   RDW % 14 5 14 4 14 6 14 4 14 5   MPV fL 10 1 10 2 9 9 10 0 10 2   NRBC AUTO /100 WBCs 0  --  0  --  0         CMP:  Results from last 7 days   Lab Units 04/26/23  0535 04/25/23  0501 04/24/23 0441 04/23/23  0257   POTASSIUM mmol/L 3 0* 3 4* 3 7 3 7   CHLORIDE mmol/L 109* 103 104 103   CO2 mmol/L 23 22 26 32   BUN mg/dL 15 13 8 10   CREATININE mg/dL 0 44* 0 41* 0 43* 0 51*   CALCIUM mg/dL 8 0* 8 7 9 0 9 5   AST U/L 22 30 31 19   ALT U/L 14 17 16 14   ALK PHOS U/L 62 80 83 89   EGFR ml/min/1 73sq m 89 91 90 85         BMP:  Results from last 7 days   Lab Units 04/26/23  0535 04/25/23  0501 04/24/23  0441 04/23/23  0257   POTASSIUM mmol/L 3 0* 3 4* 3 7 3 7   CHLORIDE mmol/L 109* 103 104 103   CO2 mmol/L 23 22 26 32   BUN mg/dL 15 13 8 10   CREATININE mg/dL 0 44* 0 41* 0 43* 0 51*   CALCIUM mg/dL 8 0* 8 7 9 0 9 5       BNP:  No results for input(s): BNP in the last 72 hours  Magnesium:   Results from last 7 days   Lab Units 04/26/23  0535 04/25/23  0501 04/23/23  0257   MAGNESIUM mg/dL 2 0 2 0 2 3       Coags:   Results from last 7 days   Lab Units 04/26/23  0535 04/25/23  2259 04/25/23  1615 04/25/23  0501   PTT seconds 80* 74* 42*  --    INR  1 05  --  0 97 0 96       TSH:       Hemoglobin A1C       Lipid Profile:         Cardiac testing:   No results found for this or any previous visit  No results found for this or any previous visit  No results found for this or any previous visit  No results found for this or any previous visit  Imaging: I have personally reviewed pertinent reports  XR chest 1 view portable    Result Date: 4/23/2023  Narrative: CHEST INDICATION:   Possible pneumonia found on abdominopelvic CT  COMPARISON:  January 21, 2021 EXAM PERFORMED/VIEWS:  XR CHEST PORTABLE FINDINGS:  Monitoring leads and clips project over the chest  Cardiomediastinal silhouette appears unremarkable  Patchy opacity in the lung bases more dense on the right than on the left, nonspecific but probably representing chronic changes due to atelectasis/scar, as the appearance is similar when compared with January 2021  No consolidative opacity in mid or upper lung zones  No pneumothorax or pleural effusion  Osseous structures appear within normal limits for patient age  Impression: Patchy opacity in the lung bases more dense on the right than on the left, nonspecific but probably representing chronic changes due to atelectasis/scar, as the appearance is similar when compared with January 2021   Workstation performed: [id number]     CT abdomen pelvis with contrast    Addendum Date: 4/23/2023 Addendum:   ADDENDUM: This addendum corrects a dictation air in the initial report, impression 1 should read: Distended loops of small bowel without transition point, compatible with ILEUS    Result Date: 4/23/2023  Narrative: CT ABDOMEN AND PELVIS WITH IV CONTRAST INDICATION:   LLQ abdominal pain LLQ pain, N/V  COMPARISON:  CT 8/10/15 TECHNIQUE:  CT examination of the abdomen and pelvis was performed  Multiplanar 2D reformatted images were created from the source data  Radiation dose length product (DLP) for this visit:  582 mGy-cm   This examination, like all CT scans performed in the [facility], was performed utilizing techniques to minimize radiation dose exposure, including the use of iterative reconstruction and automated exposure control  IV Contrast:  100 mL of iohexol (OMNIPAQUE) Enteric Contrast:  Enteric contrast was not administered  FINDINGS: ABDOMEN LOWER CHEST:  Groundglass opacities noted within the middle and right lower lobes, no airspace infiltrate LIVER/BILIARY TREE:  Unremarkable  GALLBLADDER:  Postcholecystectomy SPLEEN:  Unremarkable  PANCREAS:  Unremarkable  ADRENAL GLANDS:  Unremarkable  KIDNEYS/URETERS:  3 mm nonobstructing right lower pole stone Bilateral cystic structures STOMACH AND BOWEL:  Small bowel is predominantly fluid-filled, no evidence of transition point  APPENDIX:  No findings to suggest appendicitis  ABDOMINOPELVIC CAVITY:  No ascites  No pneumoperitoneum  No lymphadenopathy  VESSELS:  Unremarkable for patient's age  PELVIS REPRODUCTIVE ORGANS:  Unremarkable for patient's age  URINARY BLADDER:  Unremarkable  ABDOMINAL WALL/INGUINAL REGIONS:  Midline abdominal wall sutures OSSEOUS STRUCTURES:  No acute fracture or destructive osseous lesion  Impression: 1  Distended loops of small bowel without transition point, compatible with cellulitis 2  Nonobstructing right renal stone 3    Findings of developing right-sided pneumonia Workstation performed: [id number]       EKG reviewed personally: sinus rhythm with PVC's  Telemetry reviewed personally: currently sinus rhythm, from 0825-4592 yesterday atrial fibrillation with RVR was noted    Assessment:  Principal Problem:    Ileus ([address] )  Active Problems: GERD (gastroesophageal reflux disease)    Dementia (HCC)    Anxiety    Constipated    Primary hypertension    Abnormal CT of the abdomen    Pneumonia due to infectious organism    Moderate protein-calorie malnutrition (HCC)    Hypomagnesemia    Atrial fibrillation with rapid ventricular response ([facility] [address] )      Code Status: Level 3 - DNAR and DNI

## 2023-04-26 NOTE — PHYSICAL THERAPY NOTE
Physical Therapy Re-Evaluation    Patient Name: Ines Gloria    ASWGO'U Date: 4/26/2023     Problem List  Principal Problem:    Ileus ([facility] 75 )  Active Problems:    GERD (gastroesophageal reflux disease)    Dementia (HCC)    Anxiety    Constipated    Primary hypertension    Abnormal CT of the abdomen    Pneumonia due to infectious organism    Moderate protein-calorie malnutrition ([facility] )    Atrial fibrillation with rapid ventricular response [facility])       Past Medical History  Past Medical History:   Diagnosis Date    Dysphagia     GERD (gastroesophageal reflux disease)         Past Surgical History  Past Surgical History:   Procedure Laterality Date    CATARACT EXTRACTION Bilateral     [place]    CHOLECYSTECTOMY  2015    EGD      x2 and was dilated    HYSTERECTOMY             04/26/23 0952   PT Last Visit   PT Visit Date 04/26/23   Note Type   Note type Re-Evaluation   Pain Assessment   Pain Assessment Tool 0-10   Pain Score No Pain   Restrictions/Precautions   Weight Bearing Precautions Per Order No   Other Precautions Fall Risk;Telemetry;Multiple lines;Cognitive;Visual impairment   Home Living   Additional Comments see initial evaluation   Prior Function   Comments see initial evaluation   General   Family/Caregiver Present No   Cognition   Overall Cognitive Status Impaired   Arousal/Participation Alert   Attention Attends with cues to redirect   Orientation Level Oriented to person;Disoriented to place; Disoriented to time;Disoriented to situation   Following Commands Follows one step commands with increased time or repetition   Subjective   Subjective pt agreeable to PT intervention; states we are currently at [facility]   RLE Assessment   RLE Assessment X  (3+/5 grossly)   LLE Assessment   LLE Assessment X  (3-/5 grossly)   Bed Mobility   Supine to Sit 4  Minimal assistance   Additional items Assist x 2;HOB elevated; Bedrails; Increased time required;Verbal cues;LE management   Sit to Supine   (pt OOB at end of session)   Transfers   Sit to Stand 3  Moderate assistance   Additional items Assist x 2; Increased time required;Verbal cues   Stand to Sit 3  Moderate assistance   Additional items Assist x 2; Increased time required;Verbal cues   Additional Comments RW used   Ambulation/Elevation   Gait pattern Not tested; Not appropriate   Balance   Static Sitting Fair +   Dynamic Sitting Fair +   Static Standing Fair -   Dynamic Standing Poor +   Endurance Deficit   Endurance Deficit Yes   Activity Tolerance   Activity Tolerance Patient limited by fatigue   Assessment   Prognosis Good   Problem List Decreased strength;Decreased endurance; Impaired balance;Decreased mobility; Decreased cognition;Decreased coordination; Impaired judgement;Decreased safety awareness; Impaired vision   Assessment Pt seen this date for PT re-evaluation s/p transfer to ICU for higher level of care  As compared to initial evaluation, pt much more conversational throughout session and able to follow simple, 1-step commands 75% of the time  All mobility performed with min-modA x 2, RW, and increased time  Frequent verbal cuing provided for safety awareness, sequencing, and hand placement  Pt able to stand at [facility] bedside with Kylee x 2 to maintain upright posture, however pt unable to advance LEs forward to ambulate or side step  D/t this and safety concerns, mobility limited to transfer from bed to chair  HR and SpO2 remained WFL on RA throughout  Continued PT intervention indicated to address remaining deficits, increase LOF, and facilitate safe d/c to next level of care when medically appropriate  The patient's AM-PAC Basic Mobility Inpatient Short Form Raw Score is 10  A Raw score of less than or equal to 16 suggests the patient may benefit from discharge to post-acute rehabilitation services  Please also refer to the recommendation of the Physical Therapist for safe discharge planning   Co treatment with OT secondary to complex medical condition of pt, possible A of 2 required to achieve and maintain transitional movements, requiring the need of skilled therapeutic intervention of 2 therapists to achieve delivery of services  Goals   LTG Expiration Date 05/08/23   PT Treatment Day 2   Plan   Treatment/Interventions Functional transfer training;LE strengthening/ROM; Therapeutic exercise; Endurance training;Bed mobility;Gait training   PT Frequency 3-5x/wk   Recommendation   PT Discharge Recommendation Post acute rehabilitation services   AM-PAC Basic Mobility Inpatient   Turning in Flat Bed Without Bedrails 2   Lying on Back to Sitting on Edge of Flat Bed Without Bedrails 2   Moving Bed to Chair 2   Standing Up From Chair Using Arms 2   Walk in Room 1   Climb 3-5 Stairs With Railing 1   Basic Mobility Inpatient Raw Score 10   Turning Head Towards Sound 4   Follow Simple Instructions 3   Low Function Basic Mobility Raw Score  17   Low Function Basic Mobility Standardized Score  27 46   Highest Level Of Mobility   -HLM Goal 4: Move to chair/commode   JH-HLM Achieved 5: Stand (1 or more minutes)   End of Consult   Patient Position at End of Consult Bedside chair; All needs within reach

## 2023-04-26 NOTE — ASSESSMENT & PLAN NOTE
Asymptomatic  Continue home amlodipine 5mg daily    Patient with some mild left-sided facial droop, no other focal deficits, check stat head CT, plan of care discussed with jany at bedside

## 2023-04-26 NOTE — PLAN OF CARE
Problem: PAIN - ADULT  Goal: Verbalizes/displays adequate comfort level or baseline comfort level  Description: Interventions:  - Encourage patient to monitor pain and request assistance  - Assess pain using appropriate pain scale  - Administer analgesics based on type and severity of pain and evaluate response  - Implement non-pharmacological measures as appropriate and evaluate response  - Consider cultural and social influences on pain and pain management  - Notify physician/advanced practitioner if interventions unsuccessful or patient reports new pain  Outcome: Progressing     Problem: INFECTION - ADULT  Goal: Absence or prevention of progression during hospitalization  Description: INTERVENTIONS:  - Assess and monitor for signs and symptoms of infection  - Monitor lab/diagnostic results  - Monitor all insertion sites, i e  indwelling lines, tubes, and drains  - Monitor endotracheal if appropriate and nasal secretions for changes in amount and color  - Watson appropriate cooling/warming therapies per order  - Administer medications as ordered  - Instruct and encourage patient and family to use good hand hygiene technique  - Identify and instruct in appropriate isolation precautions for identified infection/condition  Outcome: Progressing  Goal: Absence of fever/infection during neutropenic period  Description: INTERVENTIONS:  - Monitor WBC    Outcome: Progressing

## 2023-04-26 NOTE — ASSESSMENT & PLAN NOTE
· CT abd pelvis showed groundglass opacities within the middle and right lower lobes    · pneumonia, given leukocytosis and elevated procalcitonin

## 2023-04-26 NOTE — PROGRESS NOTES
[facility] [address] [place]  Progress Note  Name: Devin Chiang  MRN: [id number]  Unit/Bed#:  I Date of Admission: 4/23/2023   Date of Service: 4/26/2023 I Hospital Day: 3    Assessment/Plan   * Ileus [facility])  Assessment & Plan  Likely resolved, will advance diet, follow-up speech recs    Pneumonia due to infectious organism  Assessment & Plan  Continue antibiotics    Repeat a m  labs, respiratory status stable      Atrial fibrillation with rapid ventricular response (HCC)  Assessment & Plan  Sudden onset A-fib with RVR yesterday, now converted spontaneously back to normal sinus rhythm, case discussed with cardiology, will transition to p o  Eliquis, low-dose beta-blocker started today    Abnormal CT of the abdomen  Assessment & Plan  · CT abd pelvis showed groundglass opacities within the middle and right lower lobes    · pneumonia, given leukocytosis and elevated procalcitonin        Primary hypertension  Assessment & Plan    Asymptomatic  Continue home amlodipine 5mg daily    Patient with some mild left-sided facial droop, no other focal deficits, check stat head CT, plan of care discussed with jany at bedside    Constipated  Assessment & Plan    Continue to monitor, hold MiraLAX in the setting of ileus    Dementia ([facility] )  Assessment & Plan  Alert and oriented x1  Continue home regimen    GERD (gastroesophageal reflux disease)  Assessment & Plan  Continue protonix 40mg daily (hospital formulary for prilosec 20mg daily)    Anxiety  Assessment & Plan  Continue klonopin 0 5mg BID, zoloft 25mg daily    Moderate protein-calorie malnutrition (HCC)  Assessment & Plan  Malnutrition Findings:   Adult Malnutrition type: Chronic illness  Adult Degree of Malnutrition: Malnutrition of moderate degree  Malnutrition Characteristics: Fat loss, Muscle loss                  360 Statement: Chronic, moderate protein-calorie malnutrition related to chronic illness as evidenced by mild muscle wasting to clavicle and temple regions, mild subcutaneous fat loss to orbitals, and less than desirable BMI  Patient currently NPO-will monitor diet advancement and initiate nutrition supplement if appropriate  BMI Findings:  Adult BMI Classifications: Underweight < 18 5        Body mass index is 17 85 kg/m²  Code Status: Level 3 - DNAR and DNI    Subjective:   Patient is not a reliable historian, she offers no acute complaints, she does state that she is hungry when asked  Objective:     Vitals:   Temp (24hrs), Av 4 °F (36 9 °C), Min:98 1 °F (36 7 °C), Max:98 7 °F (37 1 °C)    Temp:  [98 1 °F (36 7 °C)-98 7 °F (37 1 °C)] 98 1 °F (36 7 °C)  HR:  [] 79  Resp:  [13-24] 17  BP: ()/() 147/65  SpO2:  [91 %-100 %] 96 %  Body mass index is 17 85 kg/m²  Input and Output Summary (last 24 hours): Intake/Output Summary (Last 24 hours) at 2023 1038  Last data filed at 2023 1837  Gross per 24 hour   Intake 931 41 ml   Output --   Net 931 41 ml       Physical Exam:   Physical Exam  Vitals and nursing note reviewed  Constitutional:       Appearance: She is not toxic-appearing  HENT:      Head: Normocephalic  Mouth/Throat:      Comments: Slight left-sided facial droop  Cardiovascular:      Rate and Rhythm: Normal rate  Pulmonary:      Effort: Pulmonary effort is normal    Abdominal:      General: Abdomen is flat  There is no distension  Palpations: Abdomen is soft  Tenderness: There is no abdominal tenderness  Musculoskeletal:      Cervical back: Normal range of motion  Skin:     General: Skin is warm  Neurological:      General: No focal deficit present  Mental Status: She is alert  Mental status is at baseline     Psychiatric:      Comments: Flat affect          Additional Data:     Labs:  Results from last 7 days   Lab Units 23  0535 23  0501 23  0441   WBC Thousand/uL 6 00   < > 15 77*   HEMOGLOBIN g/dL 9 7*   < > 11 7   HEMATOCRIT % 29 1*   < > 35 2   PLATELETS Thousands/uL 161   < > 198   BANDS PCT %  --   --  4   NEUTROS PCT % 75   < >  --    LYMPHS PCT % 13*   < >  --    LYMPHO PCT %  --   --  3*   MONOS PCT % 11   < >  --    MONO PCT %  --   --  6   EOS PCT % 1   < > 0    < > = values in this interval not displayed  Results from last 7 days   Lab Units 04/26/23  0535   SODIUM mmol/L 138   POTASSIUM mmol/L 3 0*   CHLORIDE mmol/L 109*   CO2 mmol/L 23   BUN mg/dL 15   CREATININE mg/dL 0 44*   ANION GAP mmol/L 6   CALCIUM mg/dL 8 0*   ALBUMIN g/dL 2 8*   TOTAL BILIRUBIN mg/dL 0 35   ALK PHOS U/L 62   ALT U/L 14   AST U/L 22   GLUCOSE RANDOM mg/dL 115     Results from last 7 days   Lab Units 04/26/23  0535   INR  1 05             Results from last 7 days   Lab Units 04/26/23  0535 04/25/23  0501 04/24/23  0441 04/23/23  0257   PROCALCITONIN ng/ml 0 72* 0 55* 0 87* 0 05       Lines/Drains:  Invasive Devices     Peripheral Intravenous Line  Duration           Peripheral IV 04/25/23 Left;Ventral (anterior) Forearm <1 day    Peripheral IV 04/25/23 Right;Ventral (anterior) Forearm <1 day    Peripheral IV 04/25/23 Right;Ventral (anterior) Forearm <1 day          Drain  Duration           External Urinary Catheter <1 day                      Imaging: No pertinent imaging reviewed  Recent Cultures (last 7 days):   Results from last 7 days   Lab Units 04/23/23  0540   BLOOD CULTURE  No Growth at 48 hrs  No Growth at 48 hrs         Last 24 Hours Medication List:   Current Facility-Administered Medications   Medication Dose Route Frequency Provider Last Rate    acetaminophen  650 mg Oral Q6H PRN Grisel Esthela Armendariz MD      amLODIPine  5 mg Oral Daily Grisel Teague MD      aspirin  81 mg Oral Daily Lubertha Older Tim dunes, DO      atorvastatin  40 mg Oral Daily With Emmons Majestic Tim dunes, DO      bisacodyl  10 mg Rectal Daily PRN Andrei Rangel PA-C      cefTRIAXone  1,000 mg Intravenous Q24H Casimiro Cockayne, MD 1,000 mg (04/26/23 0532)   [facility] clonazePAM  0 5 mg Oral BID Grisel Armendariz MD      dextrose 5 % and sodium chloride 0 9 %  75 mL/hr Intravenous Continuous Grisel Cardoza MD 75 mL/hr (04/25/23 2246)    heparin (porcine)  3-20 Units/kg/hr (Order-Specific) Intravenous Titrated Pamela Son Evelia, DO 12 Units/kg/hr (04/25/23 1658)    heparin (porcine)  1,350 Units Intravenous Q6H PRN Pamela Son Evelia, DO      heparin (porcine)  2,700 Units Intravenous Q6H PRN Pamela Son Evelia, DO      metoprolol tartrate  12 5 mg Oral Q12H [address] Tia Jocelyn Saab PA-C      OLANZapine  5 mg Intramuscular Q3H PRN Grisel Quintana MD      potassium chloride  20 mEq Intravenous Once Sarah ANSHU Cooper 20 mEq (04/26/23 1017)    potassium phosphate  12 mmol Intravenous Once Pamela Son Evelia, DO      sertraline  25 mg Oral QAM Grisel Quintana MD      traZODone  50 mg Oral HS Grisel Quintana MD      trimethobenzamide  200 mg Intramuscular Q6H PRN Jerry Karimi MD          Today, Patient Was Seen By: Daniel Castro DO    **Please Note: This note may have been constructed using a voice recognition system  **

## 2023-04-26 NOTE — PLAN OF CARE
Problem: OCCUPATIONAL THERAPY ADULT  Goal: Performs self-care activities at highest level of function for planned discharge setting  See evaluation for individualized goals  Description: Treatment Interventions: ADL retraining, Functional transfer training, UE strengthening/ROM, Endurance training, Cognitive reorientation, Patient/family training, Equipment evaluation/education, Activityengagement          See flowsheet documentation for full assessment, interventions and recommendations  4/26/2023 1244 by Pa Rich OT  Note: Limitation: Decreased ADL status, Decreased Safe judgement during ADL, Decreased endurance, Decreased UE strength, Decreased self-care trans, Decreased high-level ADLs, Decreased cognition, Decreased fine motor control     Assessment: Pt is a 80 y o  female seen for OT re-evaluation s/p admit to [facility] on 4/23/2023 w/ Ileus ([address] )  Pt transferred to ICU due to requiring specific medication due to uncontrolled HR  Upon re-evaluation: Pt requires min-max (A) x1-2 with use of RW during functional transfers 2* the following deficits impacting occupational performance: weakness, decreased strength, decreased balance, decreased tolerance, impaired G[facility], impaired [facility], impaired initiation, impaired memory, impaired sequencing, impaired problem solving and decreased safety awareness  Pt to benefit from continued skilled OT tx while in the hospital to address deficits as defined above and maximize level of functional independence w ADL's and functional mobility  Occupational Performance areas to address include: grooming, bathing/shower, toilet hygiene, dressing, functional mobility, community mobility and clothing management  The patient's raw score on the AM-PAC Daily Activity Inpatient Short Form is 13  A raw score of less than 19 suggests the patient may benefit from discharge to post-acute rehabilitation services   Please refer to the recommendation of the Occupational Therapist for safe discharge planning  Pt benefited from co-evaluation of skilled OT and PT therapists in order to most appropriately address functional deficits d/t extensive assistance required for safe functional mobility, decreased activity tolerance, and regression from functioning level prior to admission and/or onset of present illness  OT/PT objectives were addressed separately; please see PT note for specific goal areas targeted       OT Discharge Recommendation: Post acute rehabilitation services

## 2023-04-26 NOTE — PROGRESS NOTES
Progress Note - General Surgery   Allison Stiles 80 y o  female MRN: [id number]  Unit/Bed#:  Encounter: [id number]    Assessment:    Probable resolved ileus, no evidence of small bowel obstruction  Now having bowel function  Events of yesterday afternoon reviewed, patient developed atrial fibrillation with RVR, now converted back to normal sinus rhythm  Patient remains in CCU  Hemodynamically stable  She remains on IV heparin drip  Nursing reports the patient had 1 fairly large bowel movement yesterday  Patient denies any abdominal pain  Patient with dementia, seems more alert today  Nonobstructing right renal stones  Right pneumonia, suspect aspiration as cause  Moderate protein calorie malnutrition, evidence of fat and muscle loss with a BMI 17 94  CBC this morning showed a normal white count of 6 0  Hemoglobin stable 9 7  CMP shows hypokalemia with K3 0, (3 4, 3 7)  Creatinine 0 44  Magnesium normal 2 0  Procalcitonin 0 72  APTT 80    Vital signs reviewed, she is in normal sinus rhythm on telemetry  BP stable  SPO2 100% on 2 L  Plan:  No plan for any general surgical intervention at this time  Elevate head of bed 30 degrees Semi-Alejo position  Aspiration precautions  Speech eval for swallowing function  Advance diet as tolerated, nectar thick liquids right now  Await speech evaluation for further dietary recommendations  Replete potassium, ordered by medicine  Maintain gentle IV fluids, D5 half-normal saline 75 ml per hour  General surgical continue to follow  Medical management at the direction of the primary attending service  Subjective/Objective      Chief Complaint: Patient denies abdominal pain  She was transferred to CCU yesterday afternoon after developing atrial fibrillation  Subjective: She is much more awake and able to hold a conversation today  She denies any abdominal pain  No chest pain or shortness of breath  Jatin Allen does have occasional raspy cough noted  Nursing notes that she had a bowel movement yesterday  The patient is no longer on IV Cardizem drip  She remains on IV heparin drip  She converted back to normal sinus rhythm noted on telemetry  Yesterday she had an obstruction series performed which showed suggestion of ileus pattern but no small bowel obstruction or transition point  She likely opened up and is now having bowel function      Scheduled Meds:  Current Facility-Administered Medications   Medication Dose Route Frequency Provider Last Rate    acetaminophen  650 mg Oral Q6H PRN Grisel Cruz MD      amLODIPine  5 mg Oral Daily Grisel Armendariz MD      cefTRIAXone  1,000 mg Intravenous Q24H Grisel Armendariz MD 1,000 mg (04/26/23 0532)    clonazePAM  0 5 mg Oral BID Grisel Armendariz MD      dextrose 5 % and sodium chloride 0 9 %  75 mL/hr Intravenous Continuous Grisel Cruz MD 75 mL/hr (04/25/23 2246)    diltiazem  1-15 mg/hr Intravenous Titrated Grisel Cruz MD Stopped (04/25/23 1702)    heparin (porcine)  3-20 Units/kg/hr (Order-Specific) Intravenous Titrated Dg Gaby Evelia, DO 12 Units/kg/hr (04/25/23 1658)    heparin (porcine)  1,350 Units Intravenous Q6H PRN Dg Gaby Evelia, DO      heparin (porcine)  2,700 Units Intravenous Q6H PRN Dg Gaby Evelia, DO      OLANZapine  5 mg Intramuscular Q3H PRN Grisel Cruz MD      sertraline  25 mg Oral QAM Girsel Cruz MD      traZODone  50 mg Oral HS Grisel Cruz MD      trimethobenzamide  200 mg Intramuscular Q6H PRN Grisel Armendariz MD       Continuous Infusions:dextrose 5 % and sodium chloride 0 9 %, 75 mL/hr, Last Rate: 75 mL/hr (04/25/23 2246)  diltiazem, 1-15 mg/hr, Last Rate: Stopped (04/25/23 1702)  heparin (porcine), 3-20 Units/kg/hr (Order-Specific), Last Rate: 12 Units/kg/hr (04/25/23 1658)      PRN Meds:   acetaminophen    heparin (porcine)    heparin (porcine)    OLANZapine   "trimethobenzamide      Objective:     Blood pressure 135/64, pulse 62, temperature 98 1 °F (36 7 °C), temperature source Temporal, resp  rate 13, height 5' 4\" (1 626 m), weight 47 4 kg (104 lb 8 oz), SpO2 100 %  ,Body mass index is 17 94 kg/m²  I/O       04/24 0701 04/25 0700 04/25 0701  04/26 0700 04/26 0701 04/27 0700    P  O   120     I V  (mL/kg)  711 4 (15)     IV Piggyback  100     Total Intake(mL/kg)  931 4 (19 7)     Urine (mL/kg/hr)       Total Output       Net  +931 4            Unmeasured Urine Occurrence 1 x 2 x     Unmeasured Stool Occurrence  1 x             Invasive Devices     Peripheral Intravenous Line  Duration           Peripheral IV 04/25/23 Left;Ventral (anterior) Forearm <1 day    Peripheral IV 04/25/23 Right;Ventral (anterior) Forearm <1 day    Peripheral IV 04/25/23 Right;Ventral (anterior) Forearm <1 day          Drain  Duration           External Urinary Catheter <1 day                Physical Exam:     Thin, frail  She is easily arousable  Overall the patient appears improved  She is oriented to her name and also the name of her niece, Cheri Rivera  She is unsure the day of the week  Today the patient is able to hold verbal conversation  Seems much more alert  Skin decreased turgor,  Extremities evidence of muscle atrophy and decreased skin turgor, loss of peripheral extremity muscle tone  Ambulation not witnessed  Heart regular rate and rhythm at present, normal sinus rhythm on telemetry at 79 bpm  Lungs occasional scattered rhonchi, she does exhibit occasional cough which sounds raspy  Abdomen positive bowel sounds  Midline surgical scar  The abdomen is soft, some epigastric tympany  No guarding or rebound  No masses are felt  Neurological exam mental status improved, no tremor  No facial asymmetry  Ambulation cannot be observed  Lab, Imaging and other studies:  I have personally reviewed pertinent lab results    , CBC:   Lab Results   Component Value Date    WBC 6 00 " 04/26/2023    HGB 9 7 (L) 04/26/2023    HCT 29 1 (L) 04/26/2023    MCV 95 04/26/2023     04/26/2023    MCH 31 7 04/26/2023    MCHC 33 3 04/26/2023    RDW 14 5 04/26/2023    MPV 10 1 04/26/2023    NRBC 0 04/26/2023   , CMP:   Lab Results   Component Value Date    SODIUM 138 04/26/2023    K 3 0 (L) 04/26/2023     (H) 04/26/2023    CO2 23 04/26/2023    BUN 15 04/26/2023    CREATININE 0 44 (L) 04/26/2023    CALCIUM 8 0 (L) 04/26/2023    AST 22 04/26/2023    ALT 14 04/26/2023    ALKPHOS 62 04/26/2023    EGFR 89 04/26/2023   , Coagulation:   Lab Results   Component Value Date    INR 1 05 04/26/2023   , Urinalysis: No results found for: [address], [address], [address], [address] [facility], [address] [facility], [address]      OBSTRUCTION SERIES 4/25/2023     INDICATION:   SBO versus ileus      COMPARISON: 4/23/2023 abdominal pelvic CT     EXAM PERFORMED/VIEWS:  XR ABDOMEN OBSTRUCTION SERIES        FINDINGS:     Gaseous distention of small and large bowel diffusely  No fluid levels or free air      No pathologic calcifications or soft tissue masses evident       Cholecystectomy clips  Osseous degenerative change and scoliosis      Examination of the chest reveals a normal cardiomediastinal silhouette  Lungs are clear      IMPRESSION:     Findings of ileus, similar to recent CT  No acute change identified      No acute cardiopulmonary abnormalities      VTE Pharmacologic Prophylaxis: Heparin  VTE Mechanical Prophylaxis: sequential compression device     Guille Bond pA-C

## 2023-04-26 NOTE — PLAN OF CARE
Problem: PHYSICAL THERAPY ADULT  Goal: Performs mobility at highest level of function for planned discharge setting  See evaluation for individualized goals  Description: Treatment/Interventions: Functional transfer training, LE strengthening/ROM, Therapeutic exercise, Endurance training, Bed mobility, Gait training          See flowsheet documentation for full assessment, interventions and recommendations  Outcome: Progressing  Note: Prognosis: Good  Problem List: Decreased strength, Decreased endurance, Impaired balance, Decreased mobility, Decreased cognition, Decreased coordination, Impaired judgement, Decreased safety awareness, Impaired vision  Assessment: Pt seen this date for PT re-evaluation s/p transfer to ICU for higher level of care  As compared to initial evaluation, pt much more conversational throughout session and able to follow simple, 1-step commands 75% of the time  All mobility performed with min-modA x 2, RW, and increased time  Frequent verbal cuing provided for safety awareness, sequencing, and hand placement  Pt able to stand at [facility] bedside with Kylee x 2 to maintain upright posture, however pt unable to advance LEs forward to ambulate or side step  D/t this and safety concerns, mobility limited to transfer from bed to chair  HR and SpO2 remained WFL on RA throughout  Continued PT intervention indicated to address remaining deficits, increase LOF, and facilitate safe d/c to next level of care when medically appropriate  The patient's AM-PAC Basic Mobility Inpatient Short Form Raw Score is 10  A Raw score of less than or equal to 16 suggests the patient may benefit from discharge to post-acute rehabilitation services  Please also refer to the recommendation of the Physical Therapist for safe discharge planning   Co treatment with OT secondary to complex medical condition of pt, possible A of 2 required to achieve and maintain transitional movements, requiring the need of skilled therapeutic intervention of 2 therapists to achieve delivery of services  PT Discharge Recommendation: Post acute rehabilitation services    See flowsheet documentation for full assessment

## 2023-04-26 NOTE — OCCUPATIONAL THERAPY NOTE
"    Occupational Therapy Re-Evaluation     Patient Name: Betty Jung  XYHPG'V Date: 4/26/2023  Problem List  Principal Problem:    Ileus ([facility] [address] )  Active Problems:    GERD (gastroesophageal reflux disease)    Dementia (HCC)    Anxiety    Constipated    Primary hypertension    Abnormal CT of the abdomen    Pneumonia due to infectious organism    Moderate protein-calorie malnutrition ([facility] [address] )    Atrial fibrillation with rapid ventricular response [facility])    Past Medical History  Past Medical History:   Diagnosis Date    Dysphagia     GERD (gastroesophageal reflux disease)      Past Surgical History  Past Surgical History:   Procedure Laterality Date    CATARACT EXTRACTION Bilateral     [place]    CHOLECYSTECTOMY  2015    EGD      x2 and was dilated    HYSTERECTOMY               04/26/23 0951   OT Last Visit   OT Visit Date 04/26/23   Note Type   Note type Re-Evaluation   Pain Assessment   Pain Score No Pain   Restrictions/Precautions   Weight Bearing Precautions Per Order No   Other Precautions Multiple lines;Telemetry;O2;Fall Risk   Home Living   Additional Comments see initial evaluation for details   Prior Function   Comments see initial evaluation for details   Subjective   Subjective \"this gianluca makes me laugh\"   ADL   Where Assessed Edge of bed   LB Dressing Assistance 2  Maximal Assistance   LB Dressing Deficit Thread RLE into underwear; Thread LLE into underwear;Pull up over hips   Toileting Assistance  2  Maximal Assistance   Toileting Deficit Clothing management up;Clothing management down;Perineal hygiene   Additional Comments pt with purewick at start of session and removed for mobility; mild incontinence; pt requires max (A) for pullup donning with cues to (A) with task   Bed Mobility   Supine to Sit 4  Minimal assistance   Additional items Assist x 2;Bedrails; Increased time required;Verbal cues;LE management   Sit to Supine   (seated in chair at end of session)   Additional Comments pt on RA " during session; SpO2 WFL with no complaints of SOB; BP and HR WFL   Transfers   Sit to Stand 3  Moderate assistance   Additional items Assist x 2; Increased time required;Verbal cues  (RW)   Stand to Sit 3  Moderate assistance   Additional items Assist x 2; Increased time required;Verbal cues  (RW)   Additional Comments pt performs with mod (A) x2 and with L lean in standing requiring mod (A) for standing balance with RW; pt with decreased  to L UE on RW   Functional Mobility   Additional Comments pt unable to perform due to difficulties mobilizing L LE in standing   Balance   Static Sitting Fair +   Dynamic Sitting Fair +   Static Standing Fair -   Dynamic Standing Poor +   Activity Tolerance   Activity Tolerance Patient limited by fatigue   RUE Assessment   RUE Assessment X  (4+/5 grossly; WFL AROM)   LUE Assessment   LUE Assessment X  (3/5 grossly; WFL PROM)   Hand Function   Gross Motor Coordination Impaired   Fine Motor Coordination Impaired   Hand Function Comments pt with significant coordination deficits, however unclear if this is due to dementia and poor attention and command following, or new functional deficit   Sensation   Light Touch No apparent deficits   Sharp/Dull No apparent deficits   Vision-Basic Assessment   Current Vision Wears glasses only for reading   Vision - Complex Assessment   Ocular Range of Motion Intact   Tracking Impaired   Visual Fields Difficulty detecting stimulus with OD LLQ; Difficulty detecting stimulus with OS LUQ; Difficulty detecting stimulus with OS LLQ   Visual Screen Results Decreased visual acuity  (unable to identify colors)   Additional Comments pt also appears to have a R neglect and not completely attending to R side   Psychosocial   Psychosocial (WDL) WDL   Perception   Inattention/Neglect Cues to attend right visual field;Cues to attend to right side of body   Cognition   Overall Cognitive Status Impaired   Arousal/Participation Alert   Attention Attends with cues to redirect   Orientation Level Oriented to person;Disoriented to time;Disoriented to place; Disoriented to situation   Memory Decreased long term memory;Decreased short term memory   Following Commands Follows one step commands with increased time or repetition   Assessment   Limitation Decreased ADL status; Decreased Safe judgement during ADL;Decreased endurance;Decreased UE strength;Decreased self-care trans;Decreased high-level ADLs; Decreased cognition;Decreased fine motor control   Assessment Pt is a 80 y o  female seen for OT re-evaluation s/p admit to [facility] on 4/23/2023 w/ Ileus ([address] )  Pt transferred to ICU due to requiring specific medication due to uncontrolled HR  Upon re-evaluation: Pt requires min-max (A) x1-2 with use of RW during functional transfers 2* the following deficits impacting occupational performance: weakness, decreased strength, decreased balance, decreased tolerance, impaired GMC, impaired [address], impaired initiation, impaired memory, impaired sequencing, impaired problem solving and decreased safety awareness  Pt to benefit from continued skilled OT tx while in the hospital to address deficits as defined above and maximize level of functional independence w ADL's and functional mobility  Occupational Performance areas to address include: grooming, bathing/shower, toilet hygiene, dressing, functional mobility, community mobility and clothing management  The patient's raw score on the AM-PAC Daily Activity Inpatient Short Form is 13  A raw score of less than 19 suggests the patient may benefit from discharge to post-acute rehabilitation services  Please refer to the recommendation of the Occupational Therapist for safe discharge planning    Pt benefited from co-evaluation of skilled OT and PT therapists in order to most appropriately address functional deficits d/t extensive assistance required for safe functional mobility, decreased activity tolerance, and regression from functioning level prior to admission and/or onset of present illness  OT/PT objectives were addressed separately; please see PT note for specific goal areas targeted  Goals   Patient Goals to go home   Short Term Goal  pt did not achieve goals set at initial evaluation; please refer to initial eval for goals   Plan   Treatment Interventions ADL retraining;Functional transfer training;UE strengthening/ROM; Endurance training;Patient/family training;Equipment evaluation/education; Activityengagement;Cognitive reorientation; Fine motor coordination activities   Goal Expiration Date 05/10/23   OT Frequency 3-5x/wk   Recommendation   OT Discharge Recommendation Post acute rehabilitation services   AM-PAC Daily Activity Inpatient   Lower Body Dressing 1   Bathing 1   Toileting 2   Upper Body Dressing 2   Grooming 2   Eating 2   Daily Activity Raw Score 10   Turning Head Towards Sound 4   Follow Simple Instructions 3   Low Function Daily Activity Raw Score 17   Low Function Daily Activity Standardized Score  28 95   AM-PAC Applied Cognition Inpatient   Following a Speech/Presentation 2   Understanding Ordinary Conversation 2   Taking Medications 1   Remembering Where Things Are Placed or Put Away 1   Remembering List of 4-5 Errands 1   Taking Care of Complicated Tasks 1   Applied Cognition Raw Score 8   Applied Cognition Standardized Score 19 32

## 2023-04-26 NOTE — ASSESSMENT & PLAN NOTE
Sudden onset A-fib with RVR yesterday, now converted spontaneously back to normal sinus rhythm, case discussed with cardiology, will transition to p o  Eliquis, low-dose beta-blocker started today

## 2023-04-26 NOTE — SPEECH THERAPY NOTE
Speech-Language Pathology Bedside Swallow Evaluation    Patient Name: Christina Montana  KFYHT'N Date: 4/26/2023     Problem List  Principal Problem:    Ileus ([address] )  Active Problems:    GERD (gastroesophageal reflux disease)    Dementia (HCC)    Anxiety    Constipated    Primary hypertension    Abnormal CT of the abdomen    Pneumonia due to infectious organism    Moderate protein-calorie malnutrition ([facility] [address] )    Hypomagnesemia    Atrial fibrillation with rapid ventricular response ([facility] [address] )    Past Medical History  Past Medical History:   Diagnosis Date    Dysphagia     GERD (gastroesophageal reflux disease)      Past Surgical History  Past Surgical History:   Procedure Laterality Date    CATARACT EXTRACTION Bilateral     [place] area    CHOLECYSTECTOMY  2015    EGD      x2 and was dilated    HYSTERECTOMY       Summary   Pt presented with minimally impaired oral stage swallowing skills and suspected minimal pharyngeal dysphagia with materials administered today   Symptoms or concerns included reduced lingual strength/ROM, audible swallows and reduced respiratory-deglutition coordination  Evaluation was limited by patient's diet orders for clear liquids only, at this time  Risk/s for Aspiration: min-mod     Recommended Diet: nectar thick liquids   Recommended Form of Meds: whole with liquid   Aspiration precautions and swallowing strategies: upright posture and only feed when fully alert  Other Recommendations: Continue frequent oral care    Current Medical Status  Per 4/23/23 H&P - Pt is a 80 y o  female with a PMH of HTN, GERD, anxiety, constipation, dementia who presents with abdominal pain for 1-2 days in Q along with 2-3 episodes of nonbloody nonbilious emesis  She also reports loose stool and is passing flatus  Pt lives at [facility]  Denies chest pain, dyspnea, headaches, fever, chills       Current Precautions:  Fall  Aspiration  Delirium    Allergies:  No known food allergies    Special Studies:  4/23/23 CT abdomen pelvis impression -   1  Distended loops of small bowel without transition point, compatible with cellulitis  2  Nonobstructing right renal stone  3  Findings of developing right-sided pneumonia    4/23/23 Chest XR impression - Patchy opacity in the lung bases more dense on the right than on the left, nonspecific but probably representing chronic changes due to atelectasis/scar, as the appearance is similar when compared with January 2021  Social/Education/Vocational Hx:  Pt lives in assisted living facility    Swallow Information   Current Risks for Dysphagia & Aspiration: decreased alertness and possible aspiration pneumonia  Current Symptoms/Concerns: change in respiratory status  Current Diet: nectar thick liquids   Baseline Diet: regular diet and thin liquids    Baseline Assessment   Behavior/Cognition: alert and waxing and waning arousal level  Speech/Language Status: able to participate in basic conversation, able to follow commands and limited verbal output  Patient Positioning: upright in bed  Pain Status/Interventions/Response to Interventions: No report of or nonverbal indications of pain  Swallow Mechanism Exam  Facial: symmetrical  Labial: WFL  Lingual: decreased ROM and bilateral decreased strength  Velum: symmetrical  Mandible:  decreased ROM  Dentition: natural dentition, several missing  Vocal quality:weak   Volitional Cough: weak   Respiratory Status: on 2L O2  Tracheostomy: n/a    Consistencies Assessed and Performance   Consistencies Administered: ice chips, thin liquids and nectar thick  Materials administered included: ice, water via cup and straw, and nectar thick water via cup and straw    Oral Stage: minimal  Bolus formation and transfer were functional with no significant oral residue noted  No overt s/s reduced oral control  Mildly reduced lingual strength and ROM noted      Pharyngeal Stage: suspected and minimal  Swallow Mechanics:  Swallowing initiation appeared prompt  Laryngeal rise was palpated and judged to be within functional limits  No coughing, throat clearing, or change in vocal quality noted today  Audible incoordination of swallow present, as well as slightly reduced respiratory-deglutition coordination  Esophageal Concerns: none reported    Strategies and Efficacy: none recommended at this time    Summary and Recommendations (see above)    Results Reviewed with: patient, RN and MD     Treatment Recommended: yes     Frequency of treatment: 2-3x/wk    Patient Stated Goal: Patient did not state goal    Dysphagia LTG  -Patient will demonstrate safe and effective oral intake (without overt s/s significant oral/pharyngeal dysphagia including s/s penetration or aspiration) for the highest appropriate diet level       Short Term Goals:  -Pt will tolerate nectar thick thin liquid with no significant s/s oral or pharyngeal dysphagia across 1-3 diagnostic session/s    -Patient will tolerate trials of upgraded food and/or liquid texture with no significant s/s of oral or pharyngeal dysphagia including aspiration across 1-3 diagnostic sessions     Speech Therapy Prognosis   Prognosis: fair    Prognosis Considerations: age, medical status, cognitive status and respiratory status

## 2023-04-26 NOTE — SPEECH THERAPY NOTE
"Speech/Language Pathology Progress Note    Patient Name: Carson Oliver  LRHWX'G Date: 4/26/2023     Subjective:  \"My nickname is Tiny  \"    Objective:  Clinician engaged patient in p o  trials of solids to determine appropriate consistency following approval by provider  Trials of dysphagia level 2, puree, and regular consistency were implemented  Patient required assistance for feeding, secondary to difficulty with self feeding and strength  Assessment:  Patient demonstrated mildly prolonged mastication with dysphagia level 2 fruit, although bolus control was adequate for majority of trials (87%) with no pocketing noted  Patient exhibited moderately prolonged mastication with regular consistency item, noting incomplete breakdown and mild oral residue following swallow  Puree was accepted and tolerated as WFL, as well as utilized to clear oral cavity of mild-mod oral residue effectively  Plan/Recommendations:  Recommend further ST services  Diet recs of dysphagia level 2/NTL communicated to provider, POA, and nursing       "

## 2023-04-26 NOTE — ASSESSMENT & PLAN NOTE
Malnutrition Findings:   Adult Malnutrition type: Chronic illness  Adult Degree of Malnutrition: Malnutrition of moderate degree  Malnutrition Characteristics: Fat loss, Muscle loss                  360 Statement: Chronic, moderate protein-calorie malnutrition related to chronic illness as evidenced by mild muscle wasting to clavicle and temple regions, mild subcutaneous fat loss to orbitals, and less than desirable BMI  Patient currently NPO-will monitor diet advancement and initiate nutrition supplement if appropriate  BMI Findings:  Adult BMI Classifications: Underweight < 18 5        Body mass index is 17 85 kg/m²

## 2023-04-27 PROBLEM — K59.00 CONSTIPATED: Status: RESOLVED | Noted: 2023-04-23 | Resolved: 2023-04-27

## 2023-04-27 LAB
ANION GAP SERPL CALCULATED.3IONS-SCNC: 6 MMOL/L (ref 4–13)
BASOPHILS # BLD AUTO: 0.02 THOUSANDS/ΜL (ref 0–0.1)
BASOPHILS NFR BLD AUTO: 1 % (ref 0–1)
BUN SERPL-MCNC: 16 MG/DL (ref 5–25)
CALCIUM SERPL-MCNC: 8.1 MG/DL (ref 8.4–10.2)
CHLORIDE SERPL-SCNC: 111 MMOL/L (ref 96–108)
CHOLEST SERPL-MCNC: 109 MG/DL
CO2 SERPL-SCNC: 24 MMOL/L (ref 21–32)
CREAT SERPL-MCNC: 0.41 MG/DL (ref 0.6–1.3)
EOSINOPHIL # BLD AUTO: 0.21 THOUSAND/ΜL (ref 0–0.61)
EOSINOPHIL NFR BLD AUTO: 5 % (ref 0–6)
ERYTHROCYTE [DISTWIDTH] IN BLOOD BY AUTOMATED COUNT: 14.6 % (ref 11.6–15.1)
GFR SERPL CREATININE-BSD FRML MDRD: 91 ML/MIN/1.73SQ M
GLUCOSE SERPL-MCNC: 85 MG/DL (ref 65–140)
HCT VFR BLD AUTO: 30.7 % (ref 34.8–46.1)
HDLC SERPL-MCNC: 37 MG/DL
HGB BLD-MCNC: 10.2 G/DL (ref 11.5–15.4)
IMM GRANULOCYTES # BLD AUTO: 0.01 THOUSAND/UL (ref 0–0.2)
IMM GRANULOCYTES NFR BLD AUTO: 0 % (ref 0–2)
LDLC SERPL CALC-MCNC: 54 MG/DL (ref 0–100)
LYMPHOCYTES # BLD AUTO: 0.84 THOUSANDS/ΜL (ref 0.6–4.47)
LYMPHOCYTES NFR BLD AUTO: 19 % (ref 14–44)
MCH RBC QN AUTO: 31.2 PG (ref 26.8–34.3)
MCHC RBC AUTO-ENTMCNC: 33.2 G/DL (ref 31.4–37.4)
MCV RBC AUTO: 94 FL (ref 82–98)
MONOCYTES # BLD AUTO: 0.54 THOUSAND/ΜL (ref 0.17–1.22)
MONOCYTES NFR BLD AUTO: 12 % (ref 4–12)
NEUTROPHILS # BLD AUTO: 2.73 THOUSANDS/ΜL (ref 1.85–7.62)
NEUTS SEG NFR BLD AUTO: 63 % (ref 43–75)
NONHDLC SERPL-MCNC: 72 MG/DL
NRBC BLD AUTO-RTO: 0 /100 WBCS
PLATELET # BLD AUTO: 157 THOUSANDS/UL (ref 149–390)
PMV BLD AUTO: 10 FL (ref 8.9–12.7)
POTASSIUM SERPL-SCNC: 3.5 MMOL/L (ref 3.5–5.3)
RBC # BLD AUTO: 3.27 MILLION/UL (ref 3.81–5.12)
SODIUM SERPL-SCNC: 141 MMOL/L (ref 135–147)
T4 FREE SERPL-MCNC: 0.95 NG/DL (ref 0.76–1.46)
TRIGL SERPL-MCNC: 91 MG/DL
TSH SERPL DL<=0.05 MIU/L-ACNC: 5.48 UIU/ML (ref 0.45–4.5)
WBC # BLD AUTO: 4.35 THOUSAND/UL (ref 4.31–10.16)

## 2023-04-27 RX ORDER — DOCUSATE SODIUM 100 MG/1
100 CAPSULE, LIQUID FILLED ORAL DAILY
Status: DISCONTINUED | OUTPATIENT
Start: 2023-04-27 | End: 2023-04-28 | Stop reason: HOSPADM

## 2023-04-27 RX ADMIN — CLONAZEPAM 0.5 MG: 0.5 TABLET ORAL at 08:28

## 2023-04-27 RX ADMIN — CEFTRIAXONE 1000 MG: 1 INJECTION, SOLUTION INTRAVENOUS at 05:54

## 2023-04-27 RX ADMIN — APIXABAN 2.5 MG: 2.5 TABLET, FILM COATED ORAL at 17:00

## 2023-04-27 RX ADMIN — TRAZODONE HYDROCHLORIDE 50 MG: 50 TABLET ORAL at 21:17

## 2023-04-27 RX ADMIN — POLYETHYLENE GLYCOL 3350 17 G: 17 POWDER, FOR SOLUTION ORAL at 08:28

## 2023-04-27 RX ADMIN — Medication 12.5 MG: at 21:17

## 2023-04-27 RX ADMIN — AMLODIPINE BESYLATE 5 MG: 5 TABLET ORAL at 08:28

## 2023-04-27 RX ADMIN — DOCUSATE SODIUM 100 MG: 100 CAPSULE, LIQUID FILLED ORAL at 12:39

## 2023-04-27 RX ADMIN — APIXABAN 2.5 MG: 2.5 TABLET, FILM COATED ORAL at 08:28

## 2023-04-27 RX ADMIN — Medication 12.5 MG: at 08:28

## 2023-04-27 RX ADMIN — CLONAZEPAM 0.5 MG: 0.5 TABLET ORAL at 17:00

## 2023-04-27 RX ADMIN — SERTRALINE 25 MG: 25 TABLET, FILM COATED ORAL at 08:28

## 2023-04-27 NOTE — SPEECH THERAPY NOTE
"Speech/Language Pathology Progress Note    Patient Name: Sonya GONZALES Date: 4/27/2023     Subjective:  \"You want me to drink? I can't even talk  \"    Objective:  Clinician engaged patient in thin liquid trials this afternoon to determine safety of swallow with prior liquid consistency and possible upgrade  Assessment:  Patient exhibited difficulty with delivery of liquids independently, requiring hand-over-hand assistance for consumption of liquid via cup d/t weakness/fatigue  Thin liquids were tolerated with 100% accuracy, as noted by no overt s/s asp/pen  Audible incoordination was noted with all swallows, however, despite bolus size  No changes in vocal quality or O2 sats noted  Plan/Recommendations:  Continue ST service for trial of thin liquids during meal intake and via straw  Continue aspiration precautions       "

## 2023-04-27 NOTE — CASE MANAGEMENT
Case Management Discharge Planning Note    Patient name Simona Gay  Location / MRN [id number]  : 10/6/1933 Date 2023       Current Admission Date: 2023  Current Admission Diagnosis:Ileus [facility])   Patient Active Problem List    Diagnosis Date Noted    Moderate protein-calorie malnutrition ([facility] [address] ) 2023    Atrial fibrillation with rapid ventricular response ([facility] [address] ) 2023    Pneumonia due to infectious organism 2023    [facility]    Primary hypertension 2023    Abnormal CT of the abdomen 2023    Anxiety 2021    Dementia ([facility] [address] ) 2021    Osteoporosis 2016    Chest pressure 2016    GERD (gastroesophageal reflux disease) 2016    Hypertensive crisis 09/15/2016    Hyperlipidemia 2016    Cholelithiasis without obstruction 2016    Pancreatitis 2016    Rheumatic tricuspid valve regurgitation 2016    Obstruction of esophagus 2016      LOS (days): 4  Geometric Mean LOS (GMLOS) (days): 4 60  Days to GMLOS:0 1     OBJECTIVE:  Risk of Unplanned Readmission Score: 14 59         Current admission status: Inpatient   Preferred Pharmacy:   [address] [place] [address]  [place] [address]  Phone: [phone] Fax: [phone]    [id number]- [address] [address]  Phone: [phone] Fax: [phone]    Primary Care Provider: Kourtney Osborne MD    Primary Insurance: MEDICARE  Secondary Insurance: BLUE CROSS    DISCHARGE DETAILS:         As per physician during inner disciplinary discharge planning meeting patient will possibly be ready for discharge in the am  PT is continuing to recommend STR  I notified Patient's Niece Jean Claude Tejada that PT is continuing to recommend STR  Jean Claude Tejada adamantly does not want patient to go to STR   She would like her to return to [facility] and have her get PT and OT there  I notified Yana Borges that patient is possibly ready for discharge in the AM  I notified Yana Borges that PT is recommending STR and nimo does not want pt to go to rehab  I notified Yana Borges that patient walk x2 today 10 feet with minimal assistance  PT also requires assistance with ADL's   Yana Borges is agreeable to patient coming back to [facility]  Valeriy Lemos is agreeable to me setting up transport back to [facility]  I placed a request in [employer] for transport tomorrow afternoon                                                                                      IMM Given (Date):: 04/27/23  IMM Given to[de-identified] Family (I reviewed with patient's niece Valeriy Lemos via phone and she is agreeable to same )

## 2023-04-27 NOTE — PROGRESS NOTES
Progress Note - General Surgery   Tia Fontaine 80 y o  female MRN: [id number]  Unit/Bed#:  Encounter: [id number]    Assessment:  Resolved ileus  Patient had a bowel movement yesterday  Passing flatus  Tolerating nectar thick, mechanical to low-fat low residual dysphagia diet  Aspiration, precautions in place  Right pneumonia, suspect aspiration  Hypokalemia yesterday, resolved with repletion  Severe protein calorie malnutrition on the basis of fat and muscle loss, BMI 17 85  Atrial fibrillation with RVR, now converted back to normal sinus rhythm  Patient remains on Eliquis and started on low-dose beta-blocker  Essential hypertension, patient is hemodynamically stable  WBC 4 35  Hemoglobin 10 2  BMP shows normal potassium 3 5 (3 0, 3 4)  Creatinine 0 41    Plan:  Discontinue MiraLAX  Start Colace 100 mg daily p o  Continue nectar thick dysphagia diet  Elevate head of bed  Out of bed to chair  A m  labs, monitor K  Replete as necessary  PT/OT recommendations for postacute rehabilitation services  The patient's niece, Yoana Casper, is her POA  DNAR/DNI CODE STATUS  No indication at this time for any general surgical intervention  General surgery will follow from the periphery through the remainder of the hospital stay    Subjective/Objective   Chief Complaint: Patient denies abdominal pain  Sitting up in bedside chair  Subjective: No overnight events  She moved her bowels yesterday  She does admit to passing flatus  The patient is currently sitting up in chair  She is on cardiac telemetry  Remains in normal sinus rhythm  Being followed by cardiology, she remains on apixaban  IV heparin drip was discontinued      Scheduled Meds:  Current Facility-Administered Medications   Medication Dose Route Frequency Provider Last Rate    acetaminophen  650 mg Oral Q6H PRN Grisel Armendariz MD      amLODIPine  5 mg Oral Daily Grisel Armendariz MD      apixaban  2 5 mg Oral BID Katie Santiago DO "    bisacodyl  10 mg Rectal Daily PRN [place] Terrazas PA-C      cefTRIAXone  1,000 mg Intravenous Q24H Grisel Hamilton MD 1,000 mg (04/27/23 0554)    clonazePAM  0 5 mg Oral BID Grisel Hamilton MD      docusate sodium  100 mg Oral Daily [place] PA-C      metoprolol tartrate  12 5 mg Oral Q12H [address] Tia Latasha Resendez PA-C      sertraline  25 mg Oral QAM Grisel Hamilton MD      traZODone  50 mg Oral HS Grisel Armendariz MD       Continuous Infusions:   PRN Meds:   acetaminophen    bisacodyl      Objective:     Blood pressure 141/63, pulse 67, temperature 98 2 °F (36 8 °C), temperature source Temporal, resp  rate 14, height 5' 4\" (1 626 m), weight 47 2 kg (104 lb), SpO2 97 %  ,Body mass index is 17 85 kg/m²  I/O       04/25 0701  04/26 0700 04/26 0701  04/27 0700 04/27 0701  04/28 0700    P  O  120 120 180    I V  (mL/kg) 711 4 (15) 1527 5 (32 4)     IV Piggyback 100 300     Total Intake(mL/kg) 931 4 (19 7) 1947 5 (41 3) 180 (3 8)    Urine (mL/kg/hr)  1900 (1 7)     Total Output  1900     Net +931 4 +47 5 +180           Unmeasured Urine Occurrence 2 x      Unmeasured Stool Occurrence 1 x            Invasive Devices     Peripheral Intravenous Line  Duration           Peripheral IV 04/25/23 Left;Ventral (anterior) Forearm 1 day    Peripheral IV 04/25/23 Right;Ventral (anterior) Forearm 1 day    Peripheral IV 04/25/23 Right;Ventral (anterior) Forearm 1 day          Drain  Duration           External Urinary Catheter 1 day              Physical Exam:    Thin, frail and cachectic appearance  Evidence of muscle wasting in the upper and lower extremities  The patient appears chronically ill  She is arousable but tired  She is oriented to her name and place, hometown  No apparent distress  Skin mild pallor, no jaundice  Decreased turgor throughout  ENT oral mucosa appears pink and moist   Sclera white OU  Orbits appear sunken OU  Heart RRR    Normal sinus rhythm on " telemetry  Lungs essentially clear, no rales or rhonchi heard  Back mild kyphotic curvature  No CVA tenderness to percussion  Abdomen not frankly distended  Positive bowel sounds are heard  The abdomen is soft throughout  No guarding or rebound  Soft throughout without any tenderness elicited  Extremities no calf tenderness, ambulation not observed  No peripheral edema  Neurological exam finds the patient at baseline, she is arousable  Does answer most questions appropriately  Slow verbal response  No obvious motor weakness apparent  No facial asymmetry  Lab, Imaging and other studies:  I have personally reviewed pertinent lab results    , CBC:   Lab Results   Component Value Date    WBC 4 35 04/27/2023    HGB 10 2 (L) 04/27/2023    HCT 30 7 (L) 04/27/2023    MCV 94 04/27/2023     04/27/2023    MCH 31 2 04/27/2023    MCHC 33 2 04/27/2023    RDW 14 6 04/27/2023    MPV 10 0 04/27/2023    NRBC 0 04/27/2023   , CMP:   Lab Results   Component Value Date    SODIUM 141 04/27/2023    K 3 5 04/27/2023     (H) 04/27/2023    CO2 24 04/27/2023    BUN 16 04/27/2023    CREATININE 0 41 (L) 04/27/2023    CALCIUM 8 1 (L) 04/27/2023    EGFR 91 04/27/2023     VTE Pharmacologic Prophylaxis: Apixaban  VTE Mechanical Prophylaxis: sequential compression device     Bren Montgomery PA-C

## 2023-04-27 NOTE — PHYSICAL THERAPY NOTE
"                                                                                  PHYSICAL THERAPY NOTE          Patient Name: Dennis Alexander  KCKNE'C Date: 4/27/2023 04/27/23 0825   PT Last Visit   PT Visit Date 04/27/23   Note Type   Note Type Treatment   Pain Assessment   Pain Assessment Tool 0-10   Pain Score No Pain   Restrictions/Precautions   Weight Bearing Precautions Per Order No   Other Precautions Chair Alarm  (Bed Alarm; Fall Risk; Multiple lines)   General   Response to Previous Treatment Patient unable to report, no changes reported from family or staff   Family/Caregiver Present No   Cognition   Overall Cognitive Status Impaired   Arousal/Participation Alert; Cooperative   Following Commands Follows one step commands with increased time or repetition   Subjective   Subjective \"My nickname is Tiny\"  Agreeable to therapy  Bed Mobility   Rolling R 4  Minimal assistance   Additional items Assist x 1;Bedrails; Increased time required;Verbal cues   Supine to Sit 3  Moderate assistance   Additional items Assist x 1;HOB elevated; Bedrails; Increased time required;Verbal cues;LE management   Additional Comments Increased time to transition to EOB  Sat EOB with fair sitting balance   Transfers   Sit to Stand 4  Minimal assistance   Additional items Assist x 2; Increased time required;Verbal cues   Stand to Sit 4  Minimal assistance   Additional items Assist x 2;Armrests; Increased time required;Verbal cues   Stand pivot 3  Moderate assistance   Additional items Assist x 2; Increased time required;Verbal cues   Toilet transfer 4  Minimal assistance   Additional items Assist x 2;Armrests; Increased time required;Verbal cues; Commode   Additional Comments HHA for transfer bed to [facility]   Ambulation/Elevation   Gait pattern Excessively slow; Short stride; Foward flexed;Decreased foot clearance;Narrow PANCHO; Improper Weight shift   Gait Assistance 4  Minimal assist   Additional items Assist x 1;Verbal cues   Assistive Device " Rolling walker   Distance 10' x 1, 15' x 1   Balance   Static Sitting Fair   Dynamic Sitting Fair   Static Standing Fair -   Dynamic Standing Poor +   Ambulatory Poor +  (RW)   Endurance Deficit   Endurance Deficit Yes   Activity Tolerance   Activity Tolerance Patient limited by fatigue   Assessment   Prognosis Good   Problem List   (Decreased strength; Decreased endurance; Impaired balance; Decreased mobility; Decreased cognition; Impaired judgement; Decreased safety awareness)   Assessment Pt  seen for PT treatment session this date with interventions consisting of  bed mobility, transfers and  gait training w/ emphasis on improving pt's ability to ambulate  Pt  Requiring frequent  cues for sequence and safety  In comparison to previous session, Pt  With improvements in activity tolerance  Tolerating ambulation short distance in room with chair follow safely  Pt is in need of continued activity in PT to improve strength balance endurance mobility transfers and ambulation with return to maximize LOF  From PT/mobility standpoint, recommendation at time of d/c would be post acute rehab  in order to promote return to PLOF and independence  The patient's [facility] Basic Mobility Inpatient Short Form Raw Score is 12  A Raw score of less than or equal to 16 suggests the patient may benefit from discharge to post-acute rehabilitation services  Please also refer to physical therapy recommendation for safe DC planning  Goals   LTG Expiration Date 05/08/23   PT Treatment Day 3   Plan   Treatment/Interventions   (Functional transfer training; LE strengthening/ROM; Therapeutic exercise;  Endurance training; Bed mobility; Gait training)   Progress Slow progress, decreased activity tolerance   PT Frequency 3-5x/wk   Recommendation   PT Discharge Recommendation Post acute rehabilitation services   AM-[facility] Basic Mobility Inpatient   Turning in Flat Bed Without Bedrails 3   Lying on Back to Sitting on Edge of Flat Bed Without Bedrails 2   Moving Bed to Chair 2   Standing Up From Chair Using Arms 2   Walk in Room 2   Climb 3-5 Stairs With Railing 1   Basic Mobility Inpatient Raw Score 12   Basic Mobility Standardized Score 32 23   Turning Head Towards Sound 4   Follow Simple Instructions 3   Low Function Basic Mobility Raw Score  19   Low Function Basic Mobility Standardized Score  31 06   Highest Level Of Mobility   -[facility] Goal 4: Move to chair/commode   JH-HLM Achieved 6: Walk 10 steps or more   Education   Education Provided Mobility training   Patient Reinforcement needed   End of Consult   Patient Position at End of Consult Bedside chair;Bed/Chair alarm activated; All needs within reach   End of Consult Comments discussed POC with PT

## 2023-04-27 NOTE — PLAN OF CARE
Problem: PAIN - ADULT  Goal: Verbalizes/displays adequate comfort level or baseline comfort level  Description: Interventions:  - Encourage patient to monitor pain and request assistance  - Assess pain using appropriate pain scale  - Administer analgesics based on type and severity of pain and evaluate response  - Implement non-pharmacological measures as appropriate and evaluate response  - Consider cultural and social influences on pain and pain management  - Notify physician/advanced practitioner if interventions unsuccessful or patient reports new pain  Outcome: Progressing     Problem: INFECTION - ADULT  Goal: Absence or prevention of progression during hospitalization  Description: INTERVENTIONS:  - Assess and monitor for signs and symptoms of infection  - Monitor lab/diagnostic results  - Monitor all insertion sites, i e  indwelling lines, tubes, and drains  - Monitor endotracheal if appropriate and nasal secretions for changes in amount and color  - [place] appropriate cooling/warming therapies per order  - Administer medications as ordered  - Instruct and encourage patient and family to use good hand hygiene technique  - Identify and instruct in appropriate isolation precautions for identified infection/condition  Outcome: Progressing     Problem: SAFETY ADULT  Goal: Patient will remain free of falls  Description: INTERVENTIONS:  - Educate patient/family on patient safety including physical limitations  - Instruct patient to call for assistance with activity   - Consult OT/PT to assist with strengthening/mobility   - Keep Call bell within reach  - Keep bed low and locked with side rails adjusted as appropriate  - Keep care items and personal belongings within reach  - Initiate and maintain comfort rounds  - Make Fall Risk Sign visible to staff  - Offer Toileting every 2 Hours, in advance of need  - Initiate/Maintain bed/chair alarm  - Obtain necessary fall risk management equipment: bed/chair alarm, nonskid socks   - Apply yellow socks and bracelet for high fall risk patients  - Consider moving patient to room near nurses station  Outcome: Progressing     Problem: DISCHARGE PLANNING  Goal: Discharge to home or other facility with appropriate resources  Description: INTERVENTIONS:  - Identify barriers to discharge w/patient and caregiver  - Arrange for needed discharge resources and transportation as appropriate  - Identify discharge learning needs (meds, wound care, etc )  - Arrange for interpretive services to assist at discharge as needed  - Refer to Case Management Department for coordinating discharge planning if the patient needs post-hospital services based on physician/advanced practitioner order or complex needs related to functional status, cognitive ability, or social support system  Outcome: Progressing

## 2023-04-27 NOTE — PLAN OF CARE
Problem: PAIN - ADULT  Goal: Verbalizes/displays adequate comfort level or baseline comfort level  Description: Interventions:  - Encourage patient to monitor pain and request assistance  - Assess pain using appropriate pain scale  - Administer analgesics based on type and severity of pain and evaluate response  - Implement non-pharmacological measures as appropriate and evaluate response  - Consider cultural and social influences on pain and pain management  - Notify physician/advanced practitioner if interventions unsuccessful or patient reports new pain  Outcome: Progressing     Problem: INFECTION - ADULT  Goal: Absence or prevention of progression during hospitalization  Description: INTERVENTIONS:  - Assess and monitor for signs and symptoms of infection  - Monitor lab/diagnostic results  - Monitor all insertion sites, i e  indwelling lines, tubes, and drains  - Monitor endotracheal if appropriate and nasal secretions for changes in amount and color  - [place] appropriate cooling/warming therapies per order  - Administer medications as ordered  - Instruct and encourage patient and family to use good hand hygiene technique  - Identify and instruct in appropriate isolation precautions for identified infection/condition  Outcome: Progressing  Goal: Absence of fever/infection during neutropenic period  Description: INTERVENTIONS:  - Monitor WBC    Outcome: Progressing

## 2023-04-27 NOTE — PLAN OF CARE
Problem: PHYSICAL THERAPY ADULT  Goal: Performs mobility at highest level of function for planned discharge setting  See evaluation for individualized goals  Description: Treatment/Interventions: Functional transfer training, LE strengthening/ROM, Therapeutic exercise, Endurance training, Bed mobility, Gait training          See flowsheet documentation for full assessment, interventions and recommendations  Outcome: Progressing  Note: Prognosis: Good  Problem List:  (Decreased strength; Decreased endurance; Impaired balance; Decreased mobility; Decreased cognition; Impaired judgement; Decreased safety awareness)  Assessment: Pt  seen for PT treatment session this date with interventions consisting of  bed mobility, transfers and  gait training w/ emphasis on improving pt's ability to ambulate  Pt  Requiring frequent  cues for sequence and safety  In comparison to previous session, Pt  With improvements in activity tolerance  Tolerating ambulation short distance in room with chair follow safely  Pt is in need of continued activity in PT to improve strength balance endurance mobility transfers and ambulation with return to maximize LOF  From PT/mobility standpoint, recommendation at time of d/c would be post acute rehab  in order to promote return to PLOF and independence  The patient's AM-[facility] Basic Mobility Inpatient Short Form Raw Score is 12  A Raw score of less than or equal to 16 suggests the patient may benefit from discharge to post-acute rehabilitation services  Please also refer to physical therapy recommendation for safe DC planning  PT Discharge Recommendation: Post acute rehabilitation services    See flowsheet documentation for full assessment

## 2023-04-27 NOTE — PROGRESS NOTES
[address] [facility]  Progress Note  Name: Mann Rice  MRN: [id number]  Unit/Bed#:  I Date of Admission: 4/23/2023   Date of Service: 4/27/2023 I Hospital Day: 4    Assessment/Plan   * Ileus [facility])  Assessment & Plan  Resolved, tolerating diet  Atrial fibrillation with rapid ventricular response (HCC)  Assessment & Plan  Sudden onset A-fib with RVR 4/25/2023, now converted spontaneously back to normal sinus rhythm, case discussed with cardiology, continue to p o  Eliquis, low-dose beta-blocker started yesterday    Pneumonia due to infectious organism  Assessment & Plan  Continue antibiotics    Repeat a m  labs, respiratory status improved    Abnormal CT of the abdomen  Assessment & Plan  · CT abd pelvis showed groundglass opacities within the middle and right lower lobes    · pneumonia, given leukocytosis and elevated procalcitonin        Primary hypertension  Assessment & Plan    Asymptomatic  Continue home amlodipine 5mg daily  Stat CT head negative for acute pathology    Constipated-resolved as of 4/27/2023  Assessment & Plan  Resolved    Dementia [facility])  Assessment & Plan  Alert and oriented x1  Continue home regimen    GERD (gastroesophageal reflux disease)  Assessment & Plan  Continue protonix 40mg daily (hospital formulary for prilosec 20mg daily)    Anxiety  Assessment & Plan  Continue klonopin 0 5mg BID, zoloft 25mg daily    Moderate protein-calorie malnutrition (HCC)  Assessment & Plan  Malnutrition Findings:   Adult Malnutrition type: Chronic illness  Adult Degree of Malnutrition: Malnutrition of moderate degree  Malnutrition Characteristics: Fat loss, Muscle loss                  360 Statement: Chronic, moderate protein-calorie malnutrition related to chronic illness as evidenced by mild muscle wasting to clavicle and temple regions, mild subcutaneous fat loss to orbitals, and less than desirable BMI   Patient currently NPO-will monitor diet advancement and initiate nutrition supplement if appropriate  BMI Findings:  Adult BMI Classifications: Underweight < 18 5        Body mass index is 17 85 kg/m²  Code Status: Level 3 - DNAR and DNI    Subjective:   No pain, overall doing much better, case discussed with nursing staff at bedside  Objective:     Vitals:   Temp (24hrs), Av 4 °F (36 9 °C), Min:97 7 °F (36 5 °C), Max:99 4 °F (37 4 °C)    Temp:  [97 7 °F (36 5 °C)-99 4 °F (37 4 °C)] 98 2 °F (36 8 °C)  HR:  [63-84] 67  Resp:  [13-27] 14  BP: ()/(49-88) 141/63  SpO2:  [92 %-100 %] 97 %  Body mass index is 17 85 kg/m²  Input and Output Summary (last 24 hours): Intake/Output Summary (Last 24 hours) at 2023 0912  Last data filed at 2023 4831  Gross per 24 hour   Intake 2127 5 ml   Output 1900 ml   Net 227 5 ml       Physical Exam:   Physical Exam  Vitals and nursing note reviewed  Constitutional:       Comments: Cachectic chronically ill-appearing female, she is in no acute distress, does appear very frail   HENT:      Head: Normocephalic  Cardiovascular:      Rate and Rhythm: Normal rate and regular rhythm  Pulses: Normal pulses  Pulmonary:      Effort: Pulmonary effort is normal    Neurological:      Mental Status: She is alert  Mental status is at baseline  Psychiatric:      Comments: Patient responds to verbal stimuli, can answer simple yes/no questions, she is cooperative  Additional Data:     Labs:  Results from last 7 days   Lab Units 23  0452 23  0501 23  0441   WBC Thousand/uL 4 35   < > 15 77*   HEMOGLOBIN g/dL 10 2*   < > 11 7   HEMATOCRIT % 30 7*   < > 35 2   PLATELETS Thousands/uL 157   < > 198   BANDS PCT %  --   --  4   NEUTROS PCT % 63   < >  --    LYMPHS PCT % 19   < >  --    LYMPHO PCT %  --   --  3*   MONOS PCT % 12   < >  --    MONO PCT %  --   --  6   EOS PCT % 5   < > 0    < > = values in this interval not displayed       Results from last 7 days   Lab Units 23  0452 04/26/23  0535   SODIUM mmol/L 141 138   POTASSIUM mmol/L 3 5 3 0*   CHLORIDE mmol/L 111* 109*   CO2 mmol/L 24 23   BUN mg/dL 16 15   CREATININE mg/dL 0 41* 0 44*   ANION GAP mmol/L 6 6   CALCIUM mg/dL 8 1* 8 0*   ALBUMIN g/dL  --  2 8*   TOTAL BILIRUBIN mg/dL  --  0 35   ALK PHOS U/L  --  62   ALT U/L  --  14   AST U/L  --  22   GLUCOSE RANDOM mg/dL 85 115     Results from last 7 days   Lab Units 04/26/23  0535   INR  1 05             Results from last 7 days   Lab Units 04/26/23  0535 04/25/23  0501 04/24/23  0441 04/23/23  0257   PROCALCITONIN ng/ml 0 72* 0 55* 0 87* 0 05       Lines/Drains:  Invasive Devices     Peripheral Intravenous Line  Duration           Peripheral IV 04/25/23 Left;Ventral (anterior) Forearm 1 day    Peripheral IV 04/25/23 Right;Ventral (anterior) Forearm 1 day    Peripheral IV 04/25/23 Right;Ventral (anterior) Forearm 1 day          Drain  Duration           External Urinary Catheter 1 day                      Imaging: No pertinent imaging reviewed  Recent Cultures (last 7 days):   Results from last 7 days   Lab Units 04/23/23  0540   BLOOD CULTURE  No Growth at 72 hrs  No Growth at 72 hrs         Last 24 Hours Medication List:   Current Facility-Administered Medications   Medication Dose Route Frequency Provider Last Rate    acetaminophen  650 mg Oral Q6H PRN Grisel Armendariz MD      amLODIPine  5 mg Oral Daily Grisel Armendariz MD      apixaban  2 5 mg Oral BID [facility]       bisacodyl  10 mg Rectal Daily PRN Guille Bond PA-C      cefTRIAXone  1,000 mg Intravenous Q24H Grisel Lewis MD 1,000 mg (04/27/23 0554)    clonazePAM  0 5 mg Oral BID Grisel Walker MD      metoprolol tartrate  12 5 mg Oral Q12H [address] Tia Corrine Vasquez PA-C      OLANZapine  5 mg Intramuscular Q3H PRN Grisel Walker MD      polyethylene glycol  17 g Oral Daily Guille Bond PA-C      sertraline  25 mg Oral QAM Grisel Walker MD      traZODone  50 mg Oral HS Grisel Quintana MD      trimethobenzamide  200 mg Intramuscular Q6H PRN Jerry Karimi MD          Today, Patient Was Seen By: Daniel Castro DO    **Please Note: This note may have been constructed using a voice recognition system  **

## 2023-04-27 NOTE — PROGRESS NOTES
Cardiology Progress Note - Mitzi Hassan 80 y o  female MRN: [id number]    Unit/Bed#:  Encounter: [id number]    Assessment/Plan:  1  Paroxysmal atrial fibrillation with RVR              - developed atrial fibrillation with RVR on 4/25/23 - telemetry was placed once patient was found to be tachycardic  15 minutes of atrial fibrillation were noted on telemetry from that period forward              - atrial fibrillation likely occurred in the setting of ileus/pneumonia/NPO status              - she converted to sinus rhythm after receiving IV diltiazem and has been maintaining sinus rhythm since on telemetry              - low dose metoprolol tartrate 12 5 mg BID was added yesterday - would recommend transitioning to metoprolol succinate 25 mg daily at discharge              - due to elevated [id number] score = 4 (age+2, sex, HTN) - she was placed on IV heparin and transitioned to Eliquis 2 5 mg BID (age, weight) after discussion with patient's niece/POA              - continue telemetry monitoring   - echo from yesterday shows low normal EF at 50% with mild-moderate mitral regurgitation and moderate tricupsid regurgitation      2  Ileus              - patient presented with abdominal pain and vomiting, imaging with findings consistent with ileus              - has been treated conservatively per the primary team and general surgery              - diet has been advanced     3  Pneumonia, likely secondary to aspiration              - with imaging findings of pneumonia as well as elevated procalcitonin levels              - being treated with antibiotics per the primary team     4  Hypertension    - overall controlled on present regimen    [facility] will sign off, please call with questions  Will arrange outpatient f/u  Subjective:   Patient seen and examined  She feels well  Continues to have a productive cough    Review of Systems   Respiratory: Positive for cough and sputum production      All other systems "reviewed and are negative  Objective:   Vitals: Blood pressure 141/63, pulse 67, temperature 98 2 °F (36 8 °C), temperature source Temporal, resp  rate 14, height 5' 4\" (1 626 m), weight 47 2 kg (104 lb), SpO2 97 %  , Body mass index is 17 85 kg/m² ,   Orthostatic Blood Pressures    Flowsheet Row Most Recent Value   Blood Pressure 141/63 filed at 04/27/2023 0800   Patient Position - Orthostatic VS Lying filed at 04/27/2023 9687         Systolic (10SNY), LCI:946 , Min:96 , ABL:154     Diastolic (37TDU), TTV:17, Min:49, Max:88      Intake/Output Summary (Last 24 hours) at 4/27/2023 0900  Last data filed at 4/26/2023 2008  Gross per 24 hour   Intake 1947 5 ml   Output 1900 ml   Net 47 5 ml     Weight (last 2 days)     Date/Time Weight    04/26/23 0901 47 2 (104)            Telemetry Review: sinus rhythm, one short burst of atrial tachycardia  EKG personally reviewed by José Miguel Young PA-C  Physical Exam  Vitals reviewed  Constitutional:       General: She is not in acute distress  Appearance: She is not diaphoretic  HENT:      Head: Normocephalic and atraumatic  Eyes:      Pupils: Pupils are equal, round, and reactive to light  Neck:      Vascular: No carotid bruit  Cardiovascular:      Rate and Rhythm: Normal rate and regular rhythm  Pulses:           Radial pulses are 2+ on the right side and 2+ on the left side  Heart sounds: S1 normal and S2 normal  No murmur heard  Pulmonary:      Effort: Pulmonary effort is normal  No respiratory distress  Breath sounds: Normal breath sounds  No wheezing or rales  Abdominal:      General: There is no distension  Palpations: Abdomen is soft  Tenderness: There is no abdominal tenderness  Musculoskeletal:         General: No deformity  Normal range of motion  Cervical back: Normal range of motion  Right lower leg: No edema  Left lower leg: No edema  Skin:     General: Skin is warm and dry        Findings: No " erythema  Neurological:      General: No focal deficit present  Mental Status: She is alert and oriented to person, place, and time  Psychiatric:         Mood and Affect: Mood normal          Behavior: Behavior normal            Laboratory Results:        CBC with diff:   Results from last 7 days   Lab Units 04/27/23  0452 04/26/23  0535 04/25/23  1615 04/25/23  0501 04/24/23  0441 04/23/23  0257   WBC Thousand/uL 4 35 6 00 11 60* 9 29 15 77* 10 12   HEMOGLOBIN g/dL 10 2* 9 7* 11 8 11 0* 11 7 13 2   HEMATOCRIT % 30 7* 29 1* 35 1 33 9* 35 2 39 9   MCV fL 94 95 95 96 96 98   PLATELETS Thousands/uL 157 161 206 188 198 261   MCH pg 31 2 31 7 31 9 31 3 32 1 32 4   MCHC g/dL 33 2 33 3 33 6 32 4 33 2 33 1   RDW % 14 6 14 5 14 4 14 6 14 4 14 5   MPV fL 10 0 10 1 10 2 9 9 10 0 10 2   NRBC AUTO /100 WBCs 0 0  --  0  --  0         CMP:  Results from last 7 days   Lab Units 04/27/23  0452 04/26/23  0535 04/25/23  0501 04/24/23  0441 04/23/23  0257   POTASSIUM mmol/L 3 5 3 0* 3 4* 3 7 3 7   CHLORIDE mmol/L 111* 109* 103 104 103   CO2 mmol/L 24 23 22 26 32   BUN mg/dL 16 15 13 8 10   CREATININE mg/dL 0 41* 0 44* 0 41* 0 43* 0 51*   CALCIUM mg/dL 8 1* 8 0* 8 7 9 0 9 5   AST U/L  --  22 30 31 19   ALT U/L  --  14 17 16 14   ALK PHOS U/L  --  62 80 83 89   EGFR ml/min/1 73sq m 91 89 91 90 85         BMP:  Results from last 7 days   Lab Units 04/27/23  0452 04/26/23  0535 04/25/23  0501 04/24/23  0441 04/23/23  0257   POTASSIUM mmol/L 3 5 3 0* 3 4* 3 7 3 7   CHLORIDE mmol/L 111* 109* 103 104 103   CO2 mmol/L 24 23 22 26 32   BUN mg/dL 16 15 13 8 10   CREATININE mg/dL 0 41* 0 44* 0 41* 0 43* 0 51*   CALCIUM mg/dL 8 1* 8 0* 8 7 9 0 9 5       BNP: No results for input(s): BNP in the last 72 hours      Magnesium:   Results from last 7 days   Lab Units 04/26/23  0535 04/25/23  0501 04/23/23  0257   MAGNESIUM mg/dL 2 0 2 0 2 3       Coags:   Results from last 7 days   Lab Units 04/26/23  0535 04/25/23  2259 04/25/23  1615 04/25/23  0501   PTT seconds 80* 74* 42*  --    INR  1 05  --  0 97 0 96       TSH:        Hemoglobin A1C       Lipid Profile:       Cardiac testing:   No results found for this or any previous visit  No results found for this or any previous visit  No results found for this or any previous visit  No results found for this or any previous visit        Meds/Allergies   all current active meds have been reviewed  Medications Prior to Admission   Medication    amLODIPine (NORVASC) 5 mg tablet    clonazePAM (KlonoPIN) 0 5 mg tablet    hydrocortisone 1 % cream    Multiple Vitamins-Minerals (ICAPS AREDS FORMULA PO)    Multiple Vitamins-Minerals (PRESERVISION AREDS PO)    omeprazole (PriLOSEC) 20 mg delayed release capsule    sertraline (ZOLOFT) 25 mg tablet    traZODone (DESYREL) 50 mg tablet    Blood Pressure Monitoring (Blood Pressure Kit) KAREN          Assessment:  Principal Problem:    Ileus (HCC)  Active Problems:    GERD (gastroesophageal reflux disease)    Dementia (HCC)    Anxiety    Constipated    Primary hypertension    Abnormal CT of the abdomen    Pneumonia due to infectious organism    Moderate protein-calorie malnutrition (HCC)    Atrial fibrillation with rapid ventricular response ([facility] [address] )

## 2023-04-27 NOTE — ASSESSMENT & PLAN NOTE
Sudden onset A-fib with RVR 4/25/2023, now converted spontaneously back to normal sinus rhythm, case discussed with cardiology, continue to p o  Eliquis, low-dose beta-blocker started yesterday

## 2023-04-28 VITALS
HEIGHT: 64 IN | TEMPERATURE: 97.5 F | HEART RATE: 75 BPM | OXYGEN SATURATION: 100 % | SYSTOLIC BLOOD PRESSURE: 135 MMHG | WEIGHT: 104 LBS | DIASTOLIC BLOOD PRESSURE: 65 MMHG | RESPIRATION RATE: 18 BRPM | BODY MASS INDEX: 17.75 KG/M2

## 2023-04-28 LAB
ALBUMIN SERPL BCP-MCNC: 2.9 G/DL (ref 3.5–5)
ALP SERPL-CCNC: 64 U/L (ref 34–104)
ALT SERPL W P-5'-P-CCNC: 30 U/L (ref 7–52)
ANION GAP SERPL CALCULATED.3IONS-SCNC: 6 MMOL/L (ref 4–13)
AST SERPL W P-5'-P-CCNC: 32 U/L (ref 13–39)
BACTERIA BLD CULT: NORMAL
BACTERIA BLD CULT: NORMAL
BASOPHILS # BLD AUTO: 0.02 THOUSANDS/ΜL (ref 0–0.1)
BASOPHILS NFR BLD AUTO: 1 % (ref 0–1)
BILIRUB SERPL-MCNC: 0.32 MG/DL (ref 0.2–1)
BUN SERPL-MCNC: 8 MG/DL (ref 5–25)
CALCIUM ALBUM COR SERPL-MCNC: 9 MG/DL (ref 8.3–10.1)
CALCIUM SERPL-MCNC: 8.1 MG/DL (ref 8.4–10.2)
CHLORIDE SERPL-SCNC: 106 MMOL/L (ref 96–108)
CO2 SERPL-SCNC: 29 MMOL/L (ref 21–32)
CREAT SERPL-MCNC: 0.36 MG/DL (ref 0.6–1.3)
EOSINOPHIL # BLD AUTO: 0.18 THOUSAND/ΜL (ref 0–0.61)
EOSINOPHIL NFR BLD AUTO: 5 % (ref 0–6)
ERYTHROCYTE [DISTWIDTH] IN BLOOD BY AUTOMATED COUNT: 14.6 % (ref 11.6–15.1)
GFR SERPL CREATININE-BSD FRML MDRD: 95 ML/MIN/1.73SQ M
GLUCOSE SERPL-MCNC: 82 MG/DL (ref 65–140)
HCT VFR BLD AUTO: 32.7 % (ref 34.8–46.1)
HGB BLD-MCNC: 11 G/DL (ref 11.5–15.4)
IMM GRANULOCYTES # BLD AUTO: 0.02 THOUSAND/UL (ref 0–0.2)
IMM GRANULOCYTES NFR BLD AUTO: 1 % (ref 0–2)
LYMPHOCYTES # BLD AUTO: 0.94 THOUSANDS/ΜL (ref 0.6–4.47)
LYMPHOCYTES NFR BLD AUTO: 26 % (ref 14–44)
MAGNESIUM SERPL-MCNC: 2 MG/DL (ref 1.9–2.7)
MCH RBC QN AUTO: 31.9 PG (ref 26.8–34.3)
MCHC RBC AUTO-ENTMCNC: 33.6 G/DL (ref 31.4–37.4)
MCV RBC AUTO: 95 FL (ref 82–98)
MONOCYTES # BLD AUTO: 0.44 THOUSAND/ΜL (ref 0.17–1.22)
MONOCYTES NFR BLD AUTO: 12 % (ref 4–12)
NEUTROPHILS # BLD AUTO: 2.06 THOUSANDS/ΜL (ref 1.85–7.62)
NEUTS SEG NFR BLD AUTO: 55 % (ref 43–75)
NRBC BLD AUTO-RTO: 0 /100 WBCS
PHOSPHATE SERPL-MCNC: 3.3 MG/DL (ref 2.3–4.1)
PLATELET # BLD AUTO: 190 THOUSANDS/UL (ref 149–390)
PMV BLD AUTO: 10.2 FL (ref 8.9–12.7)
POTASSIUM SERPL-SCNC: 3.2 MMOL/L (ref 3.5–5.3)
PROCALCITONIN SERPL-MCNC: 0.23 NG/ML
PROT SERPL-MCNC: 5.4 G/DL (ref 6.4–8.4)
RBC # BLD AUTO: 3.45 MILLION/UL (ref 3.81–5.12)
SARS-COV-2 RNA RESP QL NAA+PROBE: NEGATIVE
SODIUM SERPL-SCNC: 141 MMOL/L (ref 135–147)
WBC # BLD AUTO: 3.66 THOUSAND/UL (ref 4.31–10.16)

## 2023-04-28 RX ORDER — METOPROLOL SUCCINATE 25 MG/1
25 TABLET, EXTENDED RELEASE ORAL DAILY
Status: DISCONTINUED | OUTPATIENT
Start: 2023-04-28 | End: 2023-04-28 | Stop reason: HOSPADM

## 2023-04-28 RX ORDER — METOPROLOL SUCCINATE 25 MG/1
25 TABLET, EXTENDED RELEASE ORAL DAILY
Qty: 30 TABLET | Refills: 0 | Status: SHIPPED | OUTPATIENT
Start: 2023-04-29

## 2023-04-28 RX ORDER — ACETAMINOPHEN 325 MG/1
650 TABLET ORAL EVERY 6 HOURS PRN
Refills: 0
Start: 2023-04-28

## 2023-04-28 RX ORDER — DOCUSATE SODIUM 100 MG/1
100 CAPSULE, LIQUID FILLED ORAL DAILY
Qty: 60 CAPSULE | Refills: 0 | Status: SHIPPED | OUTPATIENT
Start: 2023-04-29

## 2023-04-28 RX ADMIN — SERTRALINE 25 MG: 25 TABLET, FILM COATED ORAL at 10:03

## 2023-04-28 RX ADMIN — DOCUSATE SODIUM 100 MG: 100 CAPSULE, LIQUID FILLED ORAL at 10:03

## 2023-04-28 RX ADMIN — AMLODIPINE BESYLATE 5 MG: 5 TABLET ORAL at 10:03

## 2023-04-28 RX ADMIN — APIXABAN 2.5 MG: 2.5 TABLET, FILM COATED ORAL at 10:03

## 2023-04-28 RX ADMIN — CLONAZEPAM 0.5 MG: 0.5 TABLET ORAL at 10:03

## 2023-04-28 RX ADMIN — CEFTRIAXONE 1000 MG: 1 INJECTION, SOLUTION INTRAVENOUS at 06:08

## 2023-04-28 RX ADMIN — METOPROLOL SUCCINATE 25 MG: 25 TABLET, EXTENDED RELEASE ORAL at 10:03

## 2023-04-28 NOTE — PLAN OF CARE
Problem: PAIN - ADULT  Goal: Verbalizes/displays adequate comfort level or baseline comfort level  Description: Interventions:  - Encourage patient to monitor pain and request assistance  - Assess pain using appropriate pain scale  - Administer analgesics based on type and severity of pain and evaluate response  - Implement non-pharmacological measures as appropriate and evaluate response  - Consider cultural and social influences on pain and pain management  - Notify physician/advanced practitioner if interventions unsuccessful or patient reports new pain  Outcome: Progressing     Problem: INFECTION - ADULT  Goal: Absence or prevention of progression during hospitalization  Description: INTERVENTIONS:  - Assess and monitor for signs and symptoms of infection  - Monitor lab/diagnostic results  - Monitor all insertion sites, i e  indwelling lines, tubes, and drains  - Monitor endotracheal if appropriate and nasal secretions for changes in amount and color  - Foster appropriate cooling/warming therapies per order  - Administer medications as ordered  - Instruct and encourage patient and family to use good hand hygiene technique  - Identify and instruct in appropriate isolation precautions for identified infection/condition  Outcome: Progressing  Goal: Absence of fever/infection during neutropenic period  Description: INTERVENTIONS:  - Monitor WBC    Outcome: Progressing     Problem: SAFETY ADULT  Goal: Patient will remain free of falls  Description: INTERVENTIONS:  - Educate patient/family on patient safety including physical limitations  - Instruct patient to call for assistance with activity   - Consult OT/PT to assist with strengthening/mobility   - Keep Call bell within reach  - Keep bed low and locked with side rails adjusted as appropriate  - Keep care items and personal belongings within reach  - Initiate and maintain comfort rounds  - Make Fall Risk Sign visible to staff  - Offer Toileting every 2 Hours, in advance of need  - Initiate/Maintain bed/chair alarm  - Obtain necessary fall risk management equipment: bed/chair alarm, nonskid socks   - Apply yellow socks and bracelet for high fall risk patients  - Consider moving patient to room near nurses station  Outcome: Progressing  Goal: Maintain or return to baseline ADL function  Description: INTERVENTIONS:  -  Assess patient's ability to carry out ADLs; assess patient's baseline for ADL function and identify physical deficits which impact ability to perform ADLs (bathing, care of mouth/teeth, toileting, grooming, dressing, etc )  - Assess/evaluate cause of self-care deficits   - Assess range of motion  - Assess patient's mobility; develop plan if impaired  - Assess patient's need for assistive devices and provide as appropriate  - Encourage maximum independence but intervene and supervise when necessary  - Involve family in performance of ADLs  - Assess for home care needs following discharge   - Consider OT consult to assist with ADL evaluation and planning for discharge  - Provide patient education as appropriate  Outcome: Progressing  Goal: Maintains/Returns to pre admission functional level  Description: INTERVENTIONS:  - Perform BMAT or MOVE assessment daily    - Set and communicate daily mobility goal to care team and patient/family/caregiver  - Collaborate with rehabilitation services on mobility goals if consulted  - Perform Range of Motion 3 times a day  - Reposition patient every 3 hours    - Dangle patient 3 times a day  - Stand patient 3 times a day  - Ambulate patient 3 times a day  - Out of bed to chair 3 times a day   - Out of bed for meals 3 times a day  - Out of bed for toileting  - Record patient progress and toleration of activity level   Outcome: Progressing     Problem: DISCHARGE PLANNING  Goal: Discharge to home or other facility with appropriate resources  Description: INTERVENTIONS:  - Identify barriers to discharge w/patient and caregiver  - Arrange for needed discharge resources and transportation as appropriate  - Identify discharge learning needs (meds, wound care, etc )  - Arrange for interpretive services to assist at discharge as needed  - Refer to Case Management Department for coordinating discharge planning if the patient needs post-hospital services based on physician/advanced practitioner order or complex needs related to functional status, cognitive ability, or social support system  Outcome: Progressing     Problem: Knowledge Deficit  Goal: Patient/family/caregiver demonstrates understanding of disease process, treatment plan, medications, and discharge instructions  Description: Complete learning assessment and assess knowledge base  Interventions:  - Provide teaching at level of understanding  - Provide teaching via preferred learning methods  Outcome: Progressing     Problem: MOBILITY - ADULT  Goal: Maintain or return to baseline ADL function  Description: INTERVENTIONS:  -  Assess patient's ability to carry out ADLs; assess patient's baseline for ADL function and identify physical deficits which impact ability to perform ADLs (bathing, care of mouth/teeth, toileting, grooming, dressing, etc )  - Assess/evaluate cause of self-care deficits   - Assess range of motion  - Assess patient's mobility; develop plan if impaired  - Assess patient's need for assistive devices and provide as appropriate  - Encourage maximum independence but intervene and supervise when necessary  - Involve family in performance of ADLs  - Assess for home care needs following discharge   - Consider OT consult to assist with ADL evaluation and planning for discharge  - Provide patient education as appropriate  Outcome: Progressing  Goal: Maintains/Returns to pre admission functional level  Description: INTERVENTIONS:  - Perform BMAT or MOVE assessment daily    - Set and communicate daily mobility goal to care team and patient/family/caregiver     - Collaborate with rehabilitation services on mobility goals if consulted  - Perform Range of Motion 3 times a day  - Reposition patient every 3 hours  - Dangle patient 3 times a day  - Stand patient 3 times a day  - Ambulate patient 3 times a day  - Out of bed to chair 3 times a day   - Out of bed for meals 3 times a day  - Out of bed for toileting  - Record patient progress and toleration of activity level   Outcome: Progressing     Problem: Nutrition/Hydration-ADULT  Goal: Nutrient/Hydration intake appropriate for improving, restoring or maintaining nutritional needs  Description: Monitor and assess patient's nutrition/hydration status for malnutrition  Collaborate with interdisciplinary team and initiate plan and interventions as ordered  Monitor patient's weight and dietary intake as ordered or per policy  Utilize nutrition screening tool and intervene as necessary  Determine patient's food preferences and provide high-protein, high-caloric foods as appropriate       INTERVENTIONS:  - Monitor oral intake, urinary output, labs, and treatment plans  - Assess nutrition and hydration status and recommend course of action  - Evaluate amount of meals eaten  - Assist patient with eating if necessary   - Allow adequate time for meals  - Recommend/ encourage appropriate diets, oral nutritional supplements, and vitamin/mineral supplements  - Order, calculate, and assess calorie counts as needed  - Recommend, monitor, and adjust tube feedings and TPN/PPN based on assessed needs  - Assess need for intravenous fluids  - Provide specific nutrition/hydration education as appropriate  - Include patient/family/caregiver in decisions related to nutrition  Outcome: Progressing     Problem: Prexisting or High Potential for Compromised Skin Integrity  Goal: Skin integrity is maintained or improved  Description: INTERVENTIONS:  - Identify patients at risk for skin breakdown  - Assess and monitor skin integrity  - Assess and monitor nutrition and hydration status  - Monitor labs   - Assess for incontinence   - Turn and reposition patient  - Assist with mobility/ambulation  - Relieve pressure over bony prominences  - Avoid friction and shearing  - Provide appropriate hygiene as needed including keeping skin clean and dry  - Evaluate need for skin moisturizer/barrier cream  - Collaborate with interdisciplinary team   - Patient/family teaching  - Consider wound care consult   Outcome: Progressing

## 2023-04-28 NOTE — PHYSICAL THERAPY NOTE
PHYSICAL THERAPY NOTE          Patient Name: Corey CRUZAIDAnnaD Date: 4/28/2023 04/28/23 0824   PT Last Visit   PT Visit Date 04/28/23   Note Type   Note Type Treatment   Pain Assessment   Pain Assessment Tool 0-10   Pain Score No Pain   Restrictions/Precautions   Weight Bearing Precautions Per Order No   Other Precautions Chair Alarm; Bed Alarm; Fall Risk   General   Response to Previous Treatment Patient unable to report, no changes reported from family or staff   Family/Caregiver Present No   Cognition   Overall Cognitive Status Impaired   Arousal/Participation Alert; Cooperative   Following Commands Follows one step commands with increased time or repetition   Subjective   Subjective Agreeable to therapy  Bed Mobility   Supine to Sit 3  Moderate assistance   Additional items Assist x 1;HOB elevated; Increased time required;Verbal cues;LE management   Additional Comments Increased time to transition to EOB  Sat EOB with fair+ sitting balance   Transfers   Sit to Stand 4  Minimal assistance   Additional items Assist x 2; Increased time required;Verbal cues   Stand to Sit 4  Minimal assistance   Additional items Assist x 1; Armrests; Increased time required;Verbal cues   Stand pivot 3  Moderate assistance   Additional items Assist x 2; Increased time required;Verbal cues   Toilet transfer 4  Minimal assistance   Additional items Assist x 2; Increased time required;Verbal cues; Commode   Additional Comments Difficulty with pivot to BSC, advancing LE's  Unable to void on [facility]   Ambulation/Elevation   Gait pattern Excessively slow; Short stride; Foward flexed;Decreased foot clearance;Narrow PANCHO; Improper Weight shift   Gait Assistance 4  Minimal assist   Additional items Assist x 1   Distance 20' chair follow safety   Balance   Static Sitting Fair +   Dynamic Sitting Fair +   Static Standing Fair -   Dynamic Standing Poor +   Ambulatory Poor +  (RW)   Endurance Deficit   Endurance Deficit Yes   Activity Tolerance   Activity Tolerance Patient limited by fatigue   Assessment   Prognosis Fair   Problem List   (Decreased strength; Decreased endurance; Impaired balance; Decreased mobility; Decreased cognition; Impaired judgement; Decreased safety awareness)   Assessment Pt  seen for PT treatment session this date with interventions consisting of bed mobility, transfers and  gait training w/ emphasis on improving pt's ability to ambulate  Pt  Requiring frequent  cues for sequence and safety  In comparison to previous session, Pt  With min improvement  in activity tolerance  Pt is in need of continued activity in PT to improve strength balance endurance mobility transfers and ambulation with return to maximize LOF  From PT/mobility standpoint, recommendation at time of d/c would be post acute rehab  in order to promote return to PLOF and independence  The patient's AM-PAC Basic Mobility Inpatient Short Form Raw Score is 12  A Raw score of less than or equal to 16 suggests the patient may benefit from discharge to post-acute rehabilitation services  Please also refer to physical therapy recommendation for safe DC planning  Goals   LTG Expiration Date 05/08/23   PT Treatment Day 4   Plan   Treatment/Interventions   (Functional transfer training; LE strengthening/ROM; Therapeutic exercise;  Endurance training; Bed mobility; Gait training)   Progress Slow progress, decreased activity tolerance   PT Frequency 3-5x/wk   Recommendation   PT Discharge Recommendation Post acute rehabilitation services   AM-PAC Basic Mobility Inpatient   Turning in Flat Bed Without Bedrails 3   Lying on Back to Sitting on Edge of Flat Bed Without Bedrails 2   Moving Bed to Chair 2   Standing Up From Chair Using Arms 2   Walk in Room 2   Climb 3-5 Stairs With Railing 1   Basic Mobility Inpatient Raw Score 12   Basic Mobility Standardized Score 32 23   Highest Level Of Mobility JH-HLM Goal 4: Move to chair/commode   JH-HLM Achieved 6: Walk 10 steps or more   Education   Education Provided Mobility training   Patient Reinforcement needed   End of Consult   Patient Position at End of Consult Bedside chair;Bed/Chair alarm activated; All needs within reach   End of Consult Comments discussed POC with PT

## 2023-04-28 NOTE — PLAN OF CARE
Problem: PAIN - ADULT  Goal: Verbalizes/displays adequate comfort level or baseline comfort level  Description: Interventions:  - Encourage patient to monitor pain and request assistance  - Assess pain using appropriate pain scale  - Administer analgesics based on type and severity of pain and evaluate response  - Implement non-pharmacological measures as appropriate and evaluate response  - Consider cultural and social influences on pain and pain management  - Notify physician/advanced practitioner if interventions unsuccessful or patient reports new pain  Outcome: Progressing     Problem: INFECTION - ADULT  Goal: Absence or prevention of progression during hospitalization  Description: INTERVENTIONS:  - Assess and monitor for signs and symptoms of infection  - Monitor lab/diagnostic results  - Monitor all insertion sites, i e  indwelling lines, tubes, and drains  - Monitor endotracheal if appropriate and nasal secretions for changes in amount and color  - [place] appropriate cooling/warming therapies per order  - Administer medications as ordered  - Instruct and encourage patient and family to use good hand hygiene technique  - Identify and instruct in appropriate isolation precautions for identified infection/condition  Outcome: Progressing     Problem: SAFETY ADULT  Goal: Patient will remain free of falls  Description: INTERVENTIONS:  - Educate patient/family on patient safety including physical limitations  - Instruct patient to call for assistance with activity   - Consult OT/PT to assist with strengthening/mobility   - Keep Call bell within reach  - Keep bed low and locked with side rails adjusted as appropriate  - Keep care items and personal belongings within reach  - Initiate and maintain comfort rounds  - Make Fall Risk Sign visible to staff  - Offer Toileting every 2 Hours, in advance of need  - Initiate/Maintain bed/chair alarm  - Obtain necessary fall risk management equipment: bed/chair alarm, nonskid socks   - Apply yellow socks and bracelet for high fall risk patients  - Consider moving patient to room near nurses station  Outcome: Progressing  Goal: Maintain or return to baseline ADL function  Description: INTERVENTIONS:  -  Assess patient's ability to carry out ADLs; assess patient's baseline for ADL function and identify physical deficits which impact ability to perform ADLs (bathing, care of mouth/teeth, toileting, grooming, dressing, etc )  - Assess/evaluate cause of self-care deficits   - Assess range of motion  - Assess patient's mobility; develop plan if impaired  - Assess patient's need for assistive devices and provide as appropriate  - Encourage maximum independence but intervene and supervise when necessary  - Involve family in performance of ADLs  - Assess for home care needs following discharge   - Consider OT consult to assist with ADL evaluation and planning for discharge  - Provide patient education as appropriate  Outcome: Progressing     Problem: DISCHARGE PLANNING  Goal: Discharge to home or other facility with appropriate resources  Description: INTERVENTIONS:  - Identify barriers to discharge w/patient and caregiver  - Arrange for needed discharge resources and transportation as appropriate  - Identify discharge learning needs (meds, wound care, etc )  - Arrange for interpretive services to assist at discharge as needed  - Refer to Case Management Department for coordinating discharge planning if the patient needs post-hospital services based on physician/advanced practitioner order or complex needs related to functional status, cognitive ability, or social support system  Outcome: Progressing     Problem: MOBILITY - ADULT  Goal: Maintain or return to baseline ADL function  Description: INTERVENTIONS:  -  Assess patient's ability to carry out ADLs; assess patient's baseline for ADL function and identify physical deficits which impact ability to perform ADLs (bathing, care of mouth/teeth, toileting, grooming, dressing, etc )  - Assess/evaluate cause of self-care deficits   - Assess range of motion  - Assess patient's mobility; develop plan if impaired  - Assess patient's need for assistive devices and provide as appropriate  - Encourage maximum independence but intervene and supervise when necessary  - Involve family in performance of ADLs  - Assess for home care needs following discharge   - Consider OT consult to assist with ADL evaluation and planning for discharge  - Provide patient education as appropriate  Outcome: Progressing

## 2023-04-28 NOTE — ASSESSMENT & PLAN NOTE
Sudden onset A-fib with RVR 4/25/2023, now converted spontaneously back to normal sinus rhythm, case discussed with cardiology, continue to p o  Eliquis, continue metoprolol succinate at discharge 25 mg daily

## 2023-04-28 NOTE — PLAN OF CARE
Problem: PHYSICAL THERAPY ADULT  Goal: Performs mobility at highest level of function for planned discharge setting  See evaluation for individualized goals  Description: Treatment/Interventions: Functional transfer training, LE strengthening/ROM, Therapeutic exercise, Endurance training, Bed mobility, Gait training          See flowsheet documentation for full assessment, interventions and recommendations  Outcome: Progressing  Note: Prognosis: Fair  Problem List:  (Decreased strength; Decreased endurance; Impaired balance; Decreased mobility; Decreased cognition; Impaired judgement; Decreased safety awareness)  Assessment: Pt  seen for PT treatment session this date with interventions consisting of bed mobility, transfers and  gait training w/ emphasis on improving pt's ability to ambulate  Pt  Requiring frequent  cues for sequence and safety  In comparison to previous session, Pt  With min improvement  in activity tolerance  Pt is in need of continued activity in PT to improve strength balance endurance mobility transfers and ambulation with return to maximize LOF  From PT/mobility standpoint, recommendation at time of d/c would be post acute rehab  in order to promote return to PLOF and independence  The patient's AM-PAC Basic Mobility Inpatient Short Form Raw Score is 12  A Raw score of less than or equal to 16 suggests the patient may benefit from discharge to post-acute rehabilitation services  Please also refer to physical therapy recommendation for safe DC planning  PT Discharge Recommendation: Post acute rehabilitation services    See flowsheet documentation for full assessment

## 2023-04-28 NOTE — DISCHARGE SUMMARY
[address] [facility] [address]  Discharge- Jake Nipper 10/6/1933, 80 y o  female MRN: [id number]  Unit/Bed#:  Encounter: [id number]  Primary Care Provider: Salazar Vazquez MD   Date and time admitted to hospital: 4/23/2023  2:39 AM    * [facility])  Assessment & Plan  Resolved, tolerating diet  Atrial fibrillation with rapid ventricular response (HCC)  Assessment & Plan  Sudden onset A-fib with RVR 4/25/2023, now converted spontaneously back to normal sinus rhythm, case discussed with cardiology, continue to p o  Eliquis, continue metoprolol succinate at discharge 25 mg daily    Pneumonia due to infectious organism  Assessment & Plan  Completed antibiotics, pneumonia resolved, likely aspiration pneumonia  Speech therapy recommendations appreciated, continue puréed diet, can have thin liquids          Abnormal CT of the abdomen  Assessment & Plan  · CT abd pelvis showed groundglass opacities within the middle and right lower lobes    · pneumonia, given leukocytosis and elevated procalcitonin, antibiotics completed        Primary hypertension  Assessment & Plan    Asymptomatic  Continue home amlodipine 5mg daily  Beta-blocker added for heart rate control, blood pressure control adequate    Dementia (HCC)  Assessment & Plan  Alert and oriented x1  Continue home regimen    Family had questions about possible transition to hospice in the future, palliative care referral placed to optimize medical status, help discuss transitions of care in the future if needed    GERD (gastroesophageal reflux disease)  Assessment & Plan  Continue protonix 40mg daily (hospital formulary for prilosec 20mg daily)    Anxiety  Assessment & Plan  Continue klonopin 0 5mg BID, zoloft 25mg daily    Moderate protein-calorie malnutrition (HCC)  Assessment & Plan  Malnutrition Findings:   Adult Malnutrition type: Chronic illness  Adult Degree of Malnutrition: Malnutrition of moderate degree  Malnutrition Characteristics: Fat loss, Muscle loss                  360 Statement: Chronic, moderate protein-calorie malnutrition related to chronic illness as evidenced by mild muscle wasting to clavicle and temple regions, mild subcutaneous fat loss to orbitals, and less than desirable BMI  Patient currently NPO-will monitor diet advancement and initiate nutrition supplement if appropriate  BMI Findings:  Adult BMI Classifications: Underweight < 18 5        Body mass index is 17 85 kg/m²  Maintain adequate p o  intake, palliative care referral        Code Status: Level 3 - DNAR and DNI     Summation of hospital course noted below      Discharge plan discussed with patient's niece via telephone, patient is medically stable for discharge, total time spent on discharge 35 minutes  Plan of care discussed with nursing staff    Reason for hospitalization, patient presented with abdominal pain, was found to have ileus, aspiration pneumonia, hospital course complicated by atrial fibrillation with rapid ventricular response, did require transfer to the stepdown level 2 unit, had rapid resolution of A-fib, was seen by cardiology, echo unremarkable, CT of the head negative for acute ischemia  Patient was treated initially with IV heparin, transition to oral Eliquis, will be discharged on beta-blocker and Eliquis  Use note patient has completed antibiotic course, ileus has resolved, patient has had urinary retention, [place] catheter placed, will discharge with Slade follow-up with urology  Subjective:   No pain    Objective:     Vitals:   Temp (24hrs), Av 6 °F (36 4 °C), Min:97 5 °F (36 4 °C), Max:97 7 °F (36 5 °C)    Temp:  [97 5 °F (36 4 °C)-97 7 °F (36 5 °C)] 97 5 °F (36 4 °C)  HR:  [65-75] 75  Resp:  [16-20] 18  BP: (102-154)/(52-74) 135/65  SpO2:  [96 %-100 %] 100 %  Body mass index is 17 85 kg/m²  Input and Output Summary (last 24 hours):      Intake/Output Summary (Last 24 hours) at 2023 1125  Last data filed at 2023 1113  Gross per 24 hour   Intake 530 ml   Output 1875 ml   Net -1345 ml       Physical Exam:   Physical Exam  Vitals and nursing note reviewed  Additional Data:     Labs:  Results from last 7 days   Lab Units 04/28/23  0428 04/25/23  0501 04/24/23  0441   WBC Thousand/uL 3 66*   < > 15 77*   HEMOGLOBIN g/dL 11 0*   < > 11 7   HEMATOCRIT % 32 7*   < > 35 2   PLATELETS Thousands/uL 190   < > 198   BANDS PCT %  --   --  4   NEUTROS PCT % 55   < >  --    LYMPHS PCT % 26   < >  --    LYMPHO PCT %  --   --  3*   MONOS PCT % 12   < >  --    MONO PCT %  --   --  6   EOS PCT % 5   < > 0    < > = values in this interval not displayed  Results from last 7 days   Lab Units 04/28/23  0428   SODIUM mmol/L 141   POTASSIUM mmol/L 3 2*   CHLORIDE mmol/L 106   CO2 mmol/L 29   BUN mg/dL 8   CREATININE mg/dL 0 36*   ANION GAP mmol/L 6   CALCIUM mg/dL 8 1*   ALBUMIN g/dL 2 9*   TOTAL BILIRUBIN mg/dL 0 32   ALK PHOS U/L 64   ALT U/L 30   AST U/L 32   GLUCOSE RANDOM mg/dL 82     Results from last 7 days   Lab Units 04/26/23  0535   INR  1 05             Results from last 7 days   Lab Units 04/28/23  0428 04/26/23  0535 04/25/23  0501 04/24/23  0441 04/23/23  0257   PROCALCITONIN ng/ml 0 23 0 72* 0 55* 0 87* 0 05       Lines/Drains:  Invasive Devices     Peripheral Intravenous Line  Duration           Peripheral IV 04/25/23 Left;Ventral (anterior) Forearm 2 days    Peripheral IV 04/25/23 Right;Ventral (anterior) Forearm 2 days    Peripheral IV 04/25/23 Right;Ventral (anterior) Forearm 2 days          Drain  Duration           Urethral Catheter 16 Fr  <1 day              Urinary Catheter:  Goal for removal: Voiding trial when ambulation improves               Imaging: No pertinent imaging reviewed  Recent Cultures (last 7 days):   Results from last 7 days   Lab Units 04/23/23  0540   BLOOD CULTURE  No Growth After 4 Days  No Growth After 4 Days         Last 24 Hours Medication List:   Current Facility-Administered Medications   Medication Dose Route Frequency Provider Last Rate    acetaminophen  650 mg Oral Q6H PRN [facility] Evelia, DO      amLODIPine  5 mg Oral Daily [place], [place]      apixaban  2 5 mg Oral BID [place], [place]      bisacodyl  10 mg Rectal Daily PRN [facility], DO      clonazePAM  0 5 mg Oral BID [facility], [facility]      docusate sodium  100 mg Oral Daily [place], [place]      metoprolol succinate  25 mg Oral Daily Barr Chalk, DO      sertraline  25 mg Oral QAM [place], [place]      traZODone  50 mg Oral HS Shaniqua Jaeger DO          Today, Patient Was Seen By: Shaniqua Jaeger DO    **Please Note: This note may have been constructed using a voice recognition system  **

## 2023-04-28 NOTE — PLAN OF CARE
Problem: PAIN - ADULT  Goal: Verbalizes/displays adequate comfort level or baseline comfort level  Description: Interventions:  - Encourage patient to monitor pain and request assistance  - Assess pain using appropriate pain scale  - Administer analgesics based on type and severity of pain and evaluate response  - Implement non-pharmacological measures as appropriate and evaluate response  - Consider cultural and social influences on pain and pain management  - Notify physician/advanced practitioner if interventions unsuccessful or patient reports new pain  4/28/2023 1009 by Susan Hinkle RN  Outcome: Completed  4/28/2023 1008 by Susan Hinkle RN  Outcome: Progressing     Problem: INFECTION - ADULT  Goal: Absence or prevention of progression during hospitalization  Description: INTERVENTIONS:  - Assess and monitor for signs and symptoms of infection  - Monitor lab/diagnostic results  - Monitor all insertion sites, i e  indwelling lines, tubes, and drains  - Monitor endotracheal if appropriate and nasal secretions for changes in amount and color  - [place] appropriate cooling/warming therapies per order  - Administer medications as ordered  - Instruct and encourage patient and family to use good hand hygiene technique  - Identify and instruct in appropriate isolation precautions for identified infection/condition  4/28/2023 1009 by Susan Hinkle RN  Outcome: Completed  4/28/2023 1008 by Susan Hinkle RN  Outcome: Progressing  Goal: Absence of fever/infection during neutropenic period  Description: INTERVENTIONS:  - Monitor WBC    4/28/2023 1009 by Susan Hinkle RN  Outcome: Completed  4/28/2023 1008 by Susan Hinkle RN  Outcome: Progressing     Problem: SAFETY ADULT  Goal: Patient will remain free of falls  Description: INTERVENTIONS:  - Educate patient/family on patient safety including physical limitations  - Instruct patient to call for assistance with activity   - Consult OT/PT to assist with strengthening/mobility   - Keep Call bell within reach  - Keep bed low and locked with side rails adjusted as appropriate  - Keep care items and personal belongings within reach  - Initiate and maintain comfort rounds  - Make Fall Risk Sign visible to staff  - Offer Toileting every 2 Hours, in advance of need  - Initiate/Maintain bed/chair alarm  - Obtain necessary fall risk management equipment: bed/chair alarm, nonskid socks   - Apply yellow socks and bracelet for high fall risk patients  - Consider moving patient to room near nurses station  4/28/2023 1009 by Kwabena Funes RN  Outcome: Completed  4/28/2023 1008 by Kwabena Funes RN  Outcome: Progressing  Goal: Maintain or return to baseline ADL function  Description: INTERVENTIONS:  -  Assess patient's ability to carry out ADLs; assess patient's baseline for ADL function and identify physical deficits which impact ability to perform ADLs (bathing, care of mouth/teeth, toileting, grooming, dressing, etc )  - Assess/evaluate cause of self-care deficits   - Assess range of motion  - Assess patient's mobility; develop plan if impaired  - Assess patient's need for assistive devices and provide as appropriate  - Encourage maximum independence but intervene and supervise when necessary  - Involve family in performance of ADLs  - Assess for home care needs following discharge   - Consider OT consult to assist with ADL evaluation and planning for discharge  - Provide patient education as appropriate  4/28/2023 1009 by Kwabena Funes RN  Outcome: Completed  4/28/2023 1008 by Kwabena Funes RN  Outcome: Progressing  Goal: Maintains/Returns to pre admission functional level  Description: INTERVENTIONS:  - Perform BMAT or MOVE assessment daily    - Set and communicate daily mobility goal to care team and patient/family/caregiver  - Collaborate with rehabilitation services on mobility goals if consulted  - Perform Range of Motion 3 times a day    - Reposition patient every 3 hours  - Dangle patient 3 times a day  - Stand patient 3 times a day  - Ambulate patient 3 times a day  - Out of bed to chair 3 times a day   - Out of bed for meals 3 times a day  - Out of bed for toileting  - Record patient progress and toleration of activity level   4/28/2023 1009 by Sondra Garrido RN  Outcome: Completed  4/28/2023 1008 by Sondra Garrido RN  Outcome: Progressing     Problem: DISCHARGE PLANNING  Goal: Discharge to home or other facility with appropriate resources  Description: INTERVENTIONS:  - Identify barriers to discharge w/patient and caregiver  - Arrange for needed discharge resources and transportation as appropriate  - Identify discharge learning needs (meds, wound care, etc )  - Arrange for interpretive services to assist at discharge as needed  - Refer to Case Management Department for coordinating discharge planning if the patient needs post-hospital services based on physician/advanced practitioner order or complex needs related to functional status, cognitive ability, or social support system  4/28/2023 1009 by Sondra Garrido RN  Outcome: Completed  4/28/2023 1008 by Sondra Garrido RN  Outcome: Progressing     Problem: Knowledge Deficit  Goal: Patient/family/caregiver demonstrates understanding of disease process, treatment plan, medications, and discharge instructions  Description: Complete learning assessment and assess knowledge base    Interventions:  - Provide teaching at level of understanding  - Provide teaching via preferred learning methods  4/28/2023 1009 by Sondra Garrido RN  Outcome: Completed  4/28/2023 1008 by Sondra Garrido RN  Outcome: Progressing     Problem: MOBILITY - ADULT  Goal: Maintain or return to baseline ADL function  Description: INTERVENTIONS:  -  Assess patient's ability to carry out ADLs; assess patient's baseline for ADL function and identify physical deficits which impact ability to perform ADLs (bathing, care of mouth/teeth, toileting, grooming, dressing, etc )  - Assess/evaluate cause of self-care deficits   - Assess range of motion  - Assess patient's mobility; develop plan if impaired  - Assess patient's need for assistive devices and provide as appropriate  - Encourage maximum independence but intervene and supervise when necessary  - Involve family in performance of ADLs  - Assess for home care needs following discharge   - Consider OT consult to assist with ADL evaluation and planning for discharge  - Provide patient education as appropriate  4/28/2023 1009 by Analisa Duckworth RN  Outcome: Completed  4/28/2023 1008 by Analisa Duckworth RN  Outcome: Progressing  Goal: Maintains/Returns to pre admission functional level  Description: INTERVENTIONS:  - Perform BMAT or MOVE assessment daily    - Set and communicate daily mobility goal to care team and patient/family/caregiver  - Collaborate with rehabilitation services on mobility goals if consulted  - Perform Range of Motion 3 times a day  - Reposition patient every 3 hours  - Dangle patient 3 times a day  - Stand patient 3 times a day  - Ambulate patient 3 times a day  - Out of bed to chair 3 times a day   - Out of bed for meals 3 times a day  - Out of bed for toileting  - Record patient progress and toleration of activity level   4/28/2023 1009 by Analisa Duckworth RN  Outcome: Completed  4/28/2023 1008 by Analisa Duckworth RN  Outcome: Progressing     Problem: Nutrition/Hydration-ADULT  Goal: Nutrient/Hydration intake appropriate for improving, restoring or maintaining nutritional needs  Description: Monitor and assess patient's nutrition/hydration status for malnutrition  Collaborate with interdisciplinary team and initiate plan and interventions as ordered  Monitor patient's weight and dietary intake as ordered or per policy  Utilize nutrition screening tool and intervene as necessary   Determine patient's food preferences and provide high-protein, high-caloric foods as appropriate       INTERVENTIONS:  - Monitor oral intake, urinary output, labs, and treatment plans  - Assess nutrition and hydration status and recommend course of action  - Evaluate amount of meals eaten  - Assist patient with eating if necessary   - Allow adequate time for meals  - Recommend/ encourage appropriate diets, oral nutritional supplements, and vitamin/mineral supplements  - Order, calculate, and assess calorie counts as needed  - Recommend, monitor, and adjust tube feedings and TPN/PPN based on assessed needs  - Assess need for intravenous fluids  - Provide specific nutrition/hydration education as appropriate  - Include patient/family/caregiver in decisions related to nutrition  4/28/2023 1009 by Teri Rangel RN  Outcome: Completed  4/28/2023 1008 by Teri Rangel RN  Outcome: Progressing     Problem: Prexisting or High Potential for Compromised Skin Integrity  Goal: Skin integrity is maintained or improved  Description: INTERVENTIONS:  - Identify patients at risk for skin breakdown  - Assess and monitor skin integrity  - Assess and monitor nutrition and hydration status  - Monitor labs   - Assess for incontinence   - Turn and reposition patient  - Assist with mobility/ambulation  - Relieve pressure over bony prominences  - Avoid friction and shearing  - Provide appropriate hygiene as needed including keeping skin clean and dry  - Evaluate need for skin moisturizer/barrier cream  - Collaborate with interdisciplinary team   - Patient/family teaching  - Consider wound care consult   4/28/2023 1009 by Teri Rangel RN  Outcome: Completed  4/28/2023 1008 by Teri Rangel RN  Outcome: Progressing

## 2023-04-28 NOTE — ASSESSMENT & PLAN NOTE
Asymptomatic  Continue home amlodipine 5mg daily  Beta-blocker added for heart rate control, blood pressure control adequate

## 2023-04-28 NOTE — ASSESSMENT & PLAN NOTE
Completed antibiotics, pneumonia resolved, likely aspiration pneumonia  Speech therapy recommendations appreciated, continue puréed diet, can have thin liquids

## 2023-04-28 NOTE — NURSING NOTE
Bladder scan showed 963  Straight cath performed, 1000mL output, 25 residual  This was the patient's third straight catheterization

## 2023-04-28 NOTE — SPEECH THERAPY NOTE
"Speech/Language Pathology Progress Note    Patient Name: Crow William  PFAPN'R Date: 4/28/2023     Subjective:  \"I can't go even talk but at least I can drink and swallow  \"    Objective:  Clinician engaged patient in upgraded consistency trials this date to determine tolerance and appropriateness to return to prior level  Thin liquids and dysphagia level 3 solids were implemented with assistance in feeding  Assessment:  Resident exhibited 100% tolerance of thin liquids via cup and straw, utilizing small/moderate size bolus sizes and no evidence of audible incoordination of swallow  Although patient's voice presents as shaky and weak, patient exhibited complete mastication with thorough and effective breakdown and minimal oral residue  No difficulty with solids requiring manual removal or resulting in overt s/s asp/pen noted  Plan/Recommendations:  Recommend upgrade in diet consistency to thin liquids  Continue aspiration precautions and dysphagia level 2 solids        "

## 2023-04-28 NOTE — ASSESSMENT & PLAN NOTE
· CT abd pelvis showed groundglass opacities within the middle and right lower lobes    · pneumonia, given leukocytosis and elevated procalcitonin, antibiotics completed

## 2023-04-28 NOTE — PROGRESS NOTES
"Cardiology Progress Note - Dennis Alexander 80 y o  female MRN: [id number]    Unit/Bed#:  Encounter: [id number]      Assessment:  1  Paroxysmal atrial fibrillation - [id number] = 4   2  Ileus  3  Pneumonia, likely aspiration   4  Benign essential hypertension  5  Low normal EF  6  Dementia      Plan:  1  Maintaining normal sinus rhythm  2  Transition metoprolol tartrate to succinate 25 mg daily  3  Continue Eliquis dosed for age and bodyweight   4   Echo noted with low normal LV and mild to moderate valvular heart disease  5  TSH noted  6  [facility] will be available, will arrange outpatient follow-up - please call with questions     Subjective:   Patient seen and examined  No significant events overnight  Objective:     Vitals: Blood pressure 130/61, pulse 66, temperature 97 7 °F (36 5 °C), temperature source Temporal, resp  rate 18, height 5' 4\" (1 626 m), weight 47 2 kg (104 lb), SpO2 97 %  , Body mass index is 17 85 kg/m² ,   Orthostatic Blood Pressures    Flowsheet Row Most Recent Value   Blood Pressure 130/61 filed at 04/27/2023 2117   Patient Position - Orthostatic VS Lying filed at 04/27/2023 2117            Intake/Output Summary (Last 24 hours) at 4/28/2023 0801  Last data filed at 4/28/2023 0501  Gross per 24 hour   Intake 710 ml   Output 1575 ml   Net -865 ml           Physical Exam:    GEN: Dennis Alexander appears well and appears stated age   [de-identified]: pupils equal, round, and reactive to light; extraocular muscles intact  NECK: supple, no carotid bruits   HEART: regular rhythm, normal S1 and S2, no murmurs, clicks, gallops or rubs   LUNGS: Coarse breath sounds bilaterally  ABDOMEN: normal bowel sounds, soft, no tenderness, no distention  EXTREMITIES: No edema   NEURO: no focal findings   SKIN: normal without suspicious lesions on exposed skin    Medications:      Current Facility-Administered Medications:     acetaminophen (TYLENOL) tablet 650 mg, 650 mg, Oral, Q6H PRN, Verl Slocumb " Evelia, DO    amLODIPine (NORVASC) tablet 5 mg, 5 mg, Oral, Daily, Aarticheryl Nuno Evelia, DO, 5 mg at 04/27/23 1132    apixaban (ELIQUIS) tablet 2 5 mg, 2 5 mg, Oral, BID, Aarticheryl Nuno Evelia, DO, 2 5 mg at 04/27/23 1700    bisacodyl (DULCOLAX) rectal suppository 10 mg, 10 mg, Rectal, Daily PRN, Aarti Nuno Evelia, DO    cefTRIAXone (ROCEPHIN) IVPB (premix in dextrose) 1,000 mg 50 mL, 1,000 mg, Intravenous, Q24H, Aarti Nuno Evelia, DO, Last Rate: 100 mL/hr at 04/28/23 0608, 1,000 mg at 04/28/23 0608    clonazePAM (KlonoPIN) tablet 0 5 mg, 0 5 mg, Oral, BID, Aarti Nuno Evelia, DO, 0 5 mg at 04/27/23 1700    docusate sodium (COLACE) capsule 100 mg, 100 mg, Oral, Daily, Aarti Ryanee Evelia, DO, 100 mg at 04/27/23 1239    metoprolol tartrate (LOPRESSOR) partial tablet 12 5 mg, 12 5 mg, Oral, Q12H [address], Aarti Sheblyee Evelia, DO, 12 5 mg at 04/27/23 2117    sertraline (ZOLOFT) tablet 25 mg, 25 mg, Oral, QAM, Aarti Ryanee Evelia, DO, 25 mg at 04/27/23 4435    traZODone (DESYREL) tablet 50 mg, 50 mg, Oral, HS, Aarti Ryanee Evelia, DO, 50 mg at 04/27/23 2117     Labs & Results:        Results from last 7 days   Lab Units 04/28/23  0428 04/27/23  0452 04/26/23  0535   WBC Thousand/uL 3 66* 4 35 6 00   HEMOGLOBIN g/dL 11 0* 10 2* 9 7*   HEMATOCRIT % 32 7* 30 7* 29 1*   PLATELETS Thousands/uL 190 157 161     Results from last 7 days   Lab Units 04/27/23  0452   TRIGLYCERIDES mg/dL 91   HDL mg/dL 37*     Results from last 7 days   Lab Units 04/28/23  0428 04/27/23  0452 04/26/23  0535 04/25/23  0501   POTASSIUM mmol/L 3 2* 3 5 3 0* 3 4*   CHLORIDE mmol/L 106 111* 109* 103   CO2 mmol/L 29 24 23 22   BUN mg/dL 8 16 15 13   CREATININE mg/dL 0 36* 0 41* 0 44* 0 41*   CALCIUM mg/dL 8 1* 8 1* 8 0* 8 7   ALK PHOS U/L 64  --  62 80   ALT U/L 30  --  14 17   AST U/L 32  --  22 30     Results from last 7 days   Lab Units 04/26/23  0535 04/25/23  2259 04/25/23  1615 04/25/23  0501   INR  1 05  --  0 97 0 96   PTT seconds 80* 74* 42*  -- Results from last 7 days   Lab Units 04/28/23  0428 04/26/23  0535 04/25/23  0501   MAGNESIUM mg/dL 2 0 2 0 2 0       Counseling / Coordination of Care  Total floor / unit time spent today 20 minutes  Greater than 50% of total time was spent with the patient and / or family counseling and / or coordination of care

## 2023-04-28 NOTE — ASSESSMENT & PLAN NOTE
Malnutrition Findings:   Adult Malnutrition type: Chronic illness  Adult Degree of Malnutrition: Malnutrition of moderate degree  Malnutrition Characteristics: Fat loss, Muscle loss                  360 Statement: Chronic, moderate protein-calorie malnutrition related to chronic illness as evidenced by mild muscle wasting to clavicle and temple regions, mild subcutaneous fat loss to orbitals, and less than desirable BMI  Patient currently NPO-will monitor diet advancement and initiate nutrition supplement if appropriate  BMI Findings:  Adult BMI Classifications: Underweight < 18 5        Body mass index is 17 85 kg/m²     Maintain adequate p o  intake, palliative care referral

## 2023-04-28 NOTE — ASSESSMENT & PLAN NOTE
Alert and oriented x1  Continue home regimen    Family had questions about possible transition to hospice in the future, palliative care referral placed to optimize medical status, help discuss transitions of care in the future if needed

## 2023-04-28 NOTE — CASE MANAGEMENT
Case Management Discharge Planning Note    Patient name Deborah Ear  Location / MRN [id number]  : 10/6/1933 Date 2023       Current Admission Date: 2023  Current Admission Diagnosis:Ileus [facility])   Patient Active Problem List    Diagnosis Date Noted    Moderate protein-calorie malnutrition ([facility] [address] ) 2023    Atrial fibrillation with rapid ventricular response ([facility] [address] ) 2023    Pneumonia due to infectious organism 2023    Ileus ([facility] [address] ) 2023    Primary hypertension 2023    Abnormal CT of the abdomen 2023    Anxiety 2021    Dementia ([facility] [address] ) 2021    Osteoporosis 2016    Chest pressure 2016    GERD (gastroesophageal reflux disease) 2016    Hypertensive crisis 09/15/2016    Hyperlipidemia 2016    Cholelithiasis without obstruction 2016    Pancreatitis 2016    Rheumatic tricuspid valve regurgitation 2016    Obstruction of esophagus 2016      LOS (days): 5  Geometric Mean LOS (GMLOS) (days): 4 60  Days to GMLOS:-0 6     OBJECTIVE:  Risk of Unplanned Readmission Score: 14 73         Current admission status: Inpatient   Preferred Pharmacy:   [facility]/pharmacy #[id number] [address]  Phone: [phone] Fax: [phone]    [facility]/pharmacy #[id number] [address]  Phone: [phone] Fax: [phone]    Primary Care Provider: Alejandra Hernandez MD    Primary Insurance: MEDICARE  Secondary Insurance: BLUE CROSS    DISCHARGE DETAILS:    Discharge planning discussed with[de-identified] Ines tobias spoke with primary nurse [facility] ([place], [place])        CM contacted family/caregiver?: Yes  Were Treatment Team discharge recommendations reviewed with patient/caregiver?: Yes  Did patient/caregiver verbalize understanding of patient care needs?: Yes  Were patient/caregiver advised of the risks associated with not following Treatment Team discharge recommendations?: Yes    Contacts  Contact Method: Phone  Reason/Outcome: Discharge Planning    Other Referral/Resources/Interventions Provided:  Interventions: Other (Specify)  Referral Comments: [facility] uses [facility], needs orders for o/p therapy    Would you like to participate in our [address] service program?  : No - Declined    Treatment Team Recommendation: Facility Return, Assisted Living  Discharge Destination Plan[de-identified] Assisted Living, Facility Return   patient returning to [facility] today  Chelo-primary nurse spoke with [facility] and they are requesting a w/c for facility  Call placed to [facility] and pt does not have one  Order placed for w/c through [employer] for delivery to facility  Pt will need order for o/p therapy, facility uses [facility]  Will fax AVS and covid results when completed  Transport picking up at 1330

## 2023-05-01 ENCOUNTER — TELEPHONE (OUTPATIENT)
Dept: UROLOGY | Facility: AMBULATORY SURGERY CENTER | Age: 88
End: 2023-05-01

## 2023-05-01 LAB

## 2023-05-01 NOTE — TELEPHONE ENCOUNTER
Npt referral urinary retention I contacted [facility] [address] regarding referral she stated pt discharged from hospital to [facility] with barron catheter,please review records if pt needs voiding trial/follow up

## 2023-05-01 NOTE — TELEPHONE ENCOUNTER
Complaint/Diagnosis:Urinary retention    Insurance:[employer]    History of cancer:no    Previous urologist:no    Outside Testing/where:[facility]    If yes,what kind:[facility]    Records requested/where:[facility]    Preferred location:[place]/?

## 2023-05-01 NOTE — TELEPHONE ENCOUNTER
Called and spoke with staff at [facility] they stated [facility] is responsible for transport  I contacted [facility] and set up time date and location

## 2023-05-09 NOTE — PROGRESS NOTES
Cardiology Follow Up    Blas Sloan  10/6/1933  [id number]  [address]  [address]  [address]  [phone]    1  Paroxysmal atrial fibrillation (HCC)        2  Essential hypertension            Discussion/Summary:  She was recently discovered to have new onset paroxysmal atrial fibrillation in the setting of an ileus as well as aspiration pneumonia  EKG today reveals sinus rhythm  She is maintained on low dose metoprolol succinate  She was noted to have hematuria the past few days in her Slade Catheter bag  Her urine appears to be ayo colored today without any gross hematuria  Therefore I recommended continuation of anticoagulation with Eliquis 2 5 mg BID (age, weight <60 kg)  Her echo at the time of admission showed low-normal EF at 50% with mild-moderate mitral and moderate tricuspid regurgitation  Her blood pressure has been acceptably controlled on her current medication regimen  No changes will be made at this time  She will RTO in 6 months with Dr Corine House or sooner if necessary  She will call the office with any concerns in the interim  Interval History: Blas Sloan is a 80 y o  female with recently diagnosed paroxysmal atrial fibrillation, hypertension, dementia who presents to the office today for hospital follow up  She was recently hospitalized with an ileus as well as aspiration pneumonia  In this setting she developed atrial fibrillation with RVR  She subsequently converted to sinus rhythm  Low dose metoprolol succinate was added as well as anticoagulation with Eliquis 2 5 mg BID given her age and weight  She is currently residing at [facility] where she has been for 3 years  She overall offers no complaints  She does have dementia and her niece helps to provide history  She is limited with her ambulation  She is receiving physical therapy   With the activity she performs she denies any shortness of breath or chest pain/pressure/discomfort  She does have bilateral lower extremity edema, left greater than right  She sits most of the day with her legs down  She denies lightheadedness, dizziness, or syncope  She denies palpitations  She apparently had hematuria the past few days noted in her barron catheter bag  Medical Problems     Problem List     Chest pressure    GERD (gastroesophageal reflux disease)    Hyperlipidemia    Hypertensive crisis    Dementia ([facility] )    Anxiety    Cholelithiasis without obstruction    Osteoporosis    Pancreatitis    Rheumatic tricuspid valve regurgitation    Obstruction of esophagus    Ileus (HCC)    Primary hypertension    Abnormal CT of the abdomen    Pneumonia due to infectious organism    Moderate protein-calorie malnutrition ([facility] )    Malnutrition Findings:                                 BMI Findings: Body mass index is 18 37 kg/m²  Atrial fibrillation with rapid ventricular response [facility])        Past Medical History:   Diagnosis Date   • Dysphagia    • GERD (gastroesophageal reflux disease)      Social History     Socioeconomic History   • Marital status:       Spouse name: Not on file   • Number of children: Not on file   • Years of education: Not on file   • Highest education level: Not on file   Occupational History   • Not on file   Tobacco Use   • Smoking status: Never   • Smokeless tobacco: Never   Vaping Use   • Vaping Use: Never used   Substance and Sexual Activity   • Alcohol use: Not Currently   • Drug use: Never   • Sexual activity: Not on file   Other Topics Concern   • Not on file   Social History Narrative   • Not on file     Social Determinants of Health     Financial Resource Strain: Not on file   Food Insecurity: No Food Insecurity   • Worried About Running Out of Food in the Last Year: Never true   • Ran Out of Food in the Last Year: Never true   Transportation Needs: No Transportation Needs   • Lack of Transportation (Medical): No   • Lack of Transportation (Non-Medical): No   Physical Activity: Not on file   Stress: Not on file   Social Connections: Not on file   Intimate Partner Violence: Not on file   Housing Stability: Low Risk    • Unable to Pay for Housing in the Last Year: No   • Number of Places Lived in the Last Year: 1   • Unstable Housing in the Last Year: No      Family History   Family history unknown: Yes     Past Surgical History:   Procedure Laterality Date   • CATARACT EXTRACTION Bilateral     [facility] area   • CHOLECYSTECTOMY  2015   • EGD      x2 and was dilated   • HYSTERECTOMY         Current Outpatient Medications:   •  acetaminophen (TYLENOL) 325 mg tablet, Take 2 tablets (650 mg total) by mouth every 6 (six) hours as needed for mild pain, fever or headaches, Disp: , Rfl: 0  •  amLODIPine (NORVASC) 5 mg tablet, Take 5 mg by mouth daily   Indications: High Blood Pressure Disorder, Disp: , Rfl:   •  apixaban (ELIQUIS) 2 5 mg, Take 1 tablet (2 5 mg total) by mouth 2 (two) times a day, Disp: 60 tablet, Rfl: 0  •  clonazePAM (KlonoPIN) 0 5 mg tablet, 0 5 mg 2 (two) times a day, Disp: , Rfl:   •  docusate sodium (COLACE) 100 mg capsule, Take 1 capsule (100 mg total) by mouth daily Do not start before April 29, 2023 , Disp: 60 capsule, Rfl: 0  •  metoprolol succinate (TOPROL-XL) 25 mg 24 hr tablet, Take 1 tablet (25 mg total) by mouth daily Do not start before April 29, 2023 , Disp: 30 tablet, Rfl: 0  •  Multiple Vitamins-Minerals (ICAPS AREDS FORMULA PO), 2 (two) times a day, Disp: , Rfl:   •  Multiple Vitamins-Minerals (PRESERVISION AREDS PO), Take 1 caplet by mouth 2 (two) times a day, Disp: , Rfl:   •  omeprazole (PriLOSEC) 20 mg delayed release capsule, Take 20 mg by mouth every morning, Disp: , Rfl:   •  sertraline (ZOLOFT) 25 mg tablet, Take 25 mg by mouth daily in the early morning, Disp: , Rfl:   •  traZODone (DESYREL) 50 mg tablet, 50 mg daily at bedtime, Disp: , Rfl:   •  amLODIPine (NORVASC) 5 mg tablet, Take 1 tablet (5 mg total) by mouth daily, Disp: 30 tablet, Rfl: 0  •  Blood Pressure Monitoring (Blood Pressure Kit) KAREN, Use 1 Device 2 (two) times a day (Patient not taking: Reported on 5/10/2023), Disp: 1 Device, Rfl: 0  No Known Allergies    Labs:     Chemistry        Component Value Date/Time     08/20/2015 0723    K 3 2 (L) 04/28/2023 0428    K 4 1 08/20/2015 0723     04/28/2023 0428     08/20/2015 0723    CO2 29 04/28/2023 0428    CO2 30 1 08/20/2015 0723    BUN 8 04/28/2023 0428    BUN 15 08/20/2015 0723    CREATININE 0 36 (L) 04/28/2023 0428    CREATININE 0 55 (L) 08/20/2015 0723        Component Value Date/Time    CALCIUM 8 1 (L) 04/28/2023 0428    CALCIUM 8 5 08/20/2015 0723    ALKPHOS 64 04/28/2023 0428    ALKPHOS 54 08/20/2015 0723    AST 32 04/28/2023 0428    AST 23 08/20/2015 0723    ALT 30 04/28/2023 0428    ALT 73 08/20/2015 0723    BILITOT 0 39 08/20/2015 0723            Lab Results   Component Value Date    CHOL 152 08/20/2015    CHOL 191 06/23/2014     Lab Results   Component Value Date    HDL 37 (L) 04/27/2023    HDL 49 01/22/2021    HDL 61 08/03/2020     Lab Results   Component Value Date    LDLCALC 54 04/27/2023    LDLCALC 68 01/22/2021    LDLCALC 88 08/03/2020     Lab Results   Component Value Date    TRIG 91 04/27/2023    TRIG 45 01/22/2021    TRIG 72 08/03/2020     No results found for: CHOLHDL    Imaging: XR chest 1 view portable    Result Date: 4/23/2023  Narrative: CHEST INDICATION:   Possible pneumonia found on abdominopelvic CT  COMPARISON:  January 21, 2021 EXAM PERFORMED/VIEWS:  XR CHEST PORTABLE FINDINGS:  Monitoring leads and clips project over the chest  Cardiomediastinal silhouette appears unremarkable   Patchy opacity in the lung bases more dense on the right than on the left, nonspecific but probably representing chronic changes due to atelectasis/scar, as the appearance is similar when compared with January 2021  No consolidative opacity in mid or upper lung zones  No pneumothorax or pleural effusion  Osseous structures appear within normal limits for patient age  Impression: Patchy opacity in the lung bases more dense on the right than on the left, nonspecific but probably representing chronic changes due to atelectasis/scar, as the appearance is similar when compared with January 2021  Workstation performed: [id number]     XR abdomen obstruction series    Result Date: 4/26/2023  Narrative: OBSTRUCTION SERIES INDICATION:   SBO versus ileus  COMPARISON: 4/23/2023 abdominal pelvic CT EXAM PERFORMED/VIEWS:  XR ABDOMEN OBSTRUCTION SERIES FINDINGS: Gaseous distention of small and large bowel diffusely  No fluid levels or free air  No pathologic calcifications or soft tissue masses evident  Cholecystectomy clips  Osseous degenerative change and scoliosis  Examination of the chest reveals a normal cardiomediastinal silhouette  Lungs are clear  Impression: Findings of ileus, similar to recent CT  No acute change identified  No acute cardiopulmonary abnormalities  Workstation performed: [id number]     CT head wo contrast    Result Date: 4/26/2023  Narrative: CT BRAIN - WITHOUT CONTRAST INDICATION:   Neuro deficit, acute, stroke suspected Weakness, facial droop  COMPARISON: CT head without contrast 1/21/2021, 2/16/2016  TECHNIQUE:  CT examination of the brain was performed  Multiplanar 2D reformatted images were created from the source data  Radiation dose length product (DLP) for this visit:  801 77 mGy-cm   This examination, like all CT scans performed in the [facility], was performed utilizing techniques to minimize radiation dose exposure, including the use of iterative  reconstruction and automated exposure control  IMAGE QUALITY:  Diagnostic   FINDINGS: PARENCHYMA: Decreased attenuation is noted in periventricular and subcortical white matter demonstrating an appearance that is statistically most likely to represent moderate microangiopathic change  No CT signs of acute infarction  No intracranial mass, mass effect or midline shift  No acute parenchymal hemorrhage  Arterial calcifications of carotid siphons and bilateral intradural vertebral arteries  VENTRICLES AND EXTRA-AXIAL SPACES:  Normal for the patient's age  VISUALIZED ORBITS: Sequela of bilateral cataract surgery  PARANASAL SINUSES: Mild mucosal thickening of the visualized paranasal sinuses  CALVARIUM AND EXTRACRANIAL SOFT TISSUES: No acute calvarial abnormality  Unchanged small left parieto-occipital scalp lesion with tiny calcification, likely calcified epidermal inclusion cyst      Impression: No acute intracranial abnormality  Workstation performed: [id number]     CT abdomen pelvis with contrast    Addendum Date: 4/23/2023 Addendum:   ADDENDUM: This addendum corrects a dictation air in the initial report, impression 1 should read: Distended loops of small bowel without transition point, compatible with ILEUS    Result Date: 4/23/2023  Narrative: CT ABDOMEN AND PELVIS WITH IV CONTRAST INDICATION:   LLQ abdominal pain LLQ pain, N/V  COMPARISON:  CT 8/10/15 TECHNIQUE:  CT examination of the abdomen and pelvis was performed  Multiplanar 2D reformatted images were created from the source data  Radiation dose length product (DLP) for this visit:  582 mGy-cm   This examination, like all CT scans performed in the [facility], was performed utilizing techniques to minimize radiation dose exposure, including the use of iterative reconstruction and automated exposure control  IV Contrast:  100 mL of iohexol (OMNIPAQUE) Enteric Contrast:  Enteric contrast was not administered  FINDINGS: ABDOMEN LOWER CHEST:  Groundglass opacities noted within the middle and right lower lobes, no airspace infiltrate LIVER/BILIARY TREE:  Unremarkable  GALLBLADDER:  Postcholecystectomy SPLEEN:  Unremarkable  PANCREAS:  Unremarkable   ADRENAL GLANDS: Unremarkable  KIDNEYS/URETERS:  3 mm nonobstructing right lower pole stone Bilateral cystic structures STOMACH AND BOWEL:  Small bowel is predominantly fluid-filled, no evidence of transition point  APPENDIX:  No findings to suggest appendicitis  ABDOMINOPELVIC CAVITY:  No ascites  No pneumoperitoneum  No lymphadenopathy  VESSELS:  Unremarkable for patient's age  PELVIS REPRODUCTIVE ORGANS:  Unremarkable for patient's age  URINARY BLADDER:  Unremarkable  ABDOMINAL WALL/INGUINAL REGIONS:  Midline abdominal wall sutures OSSEOUS STRUCTURES:  No acute fracture or destructive osseous lesion  Impression: 1  Distended loops of small bowel without transition point, compatible with cellulitis 2  Nonobstructing right renal stone 3  Findings of developing right-sided pneumonia Workstation performed: [id number]     Echo complete w/ contrast if indicated    Result Date: 4/26/2023  Narrative: •  Left Ventricle: Left ventricular cavity size is normal  Wall thickness is at the upper limits of normal  The left ventricular ejection fraction is 50%  Systolic function is low normal  There is mild global hypokinesis with regional variation  Diastolic function is mildly abnormal, consistent with grade I (abnormal) relaxation  •  Right Ventricle: Right ventricular cavity size is mildly dilated  Systolic function is normal  •  Right Atrium: The atrium is mildly dilated  •  Mitral Valve: There is mild annular calcification  There is mild to moderate regurgitation  •  Tricuspid Valve: There is moderate regurgitation  The right ventricular systolic pressure is mildly elevated  The estimated right ventricular systolic pressure is 38 21 mmHg  ECG: sinus rhythm with PAC's  LVH  Poor anterior R wave progression    Review of Systems   Cardiovascular: Positive for leg swelling  All other systems reviewed and are negative        Vitals:    05/15/23 1240   BP: 90/60   Pulse: 67     Vitals:    05/15/23 1240   Weight: 48 5 kg (107 lb) "    Height: 5' 4\" (162 6 cm)   Body mass index is 18 37 kg/m²  Physical Exam:  Physical Exam  Vitals reviewed  Constitutional:       General: She is not in acute distress  Appearance: She is not diaphoretic  HENT:      Head: Normocephalic and atraumatic  Eyes:      Pupils: Pupils are equal, round, and reactive to light  Neck:      Vascular: No carotid bruit  Cardiovascular:      Rate and Rhythm: Normal rate and regular rhythm  Pulses:           Radial pulses are 2+ on the right side and 2+ on the left side  Heart sounds: S1 normal and S2 normal  No murmur heard  Pulmonary:      Effort: Pulmonary effort is normal  No respiratory distress  Breath sounds: Normal breath sounds  No wheezing or rales  Abdominal:      General: There is no distension  Palpations: Abdomen is soft  Tenderness: There is no abdominal tenderness  Musculoskeletal:         General: No deformity  Normal range of motion  Cervical back: Normal range of motion  Right lower leg: Edema (+1) present  Left lower leg: Edema (+2) present  Skin:     General: Skin is warm and dry  Findings: No erythema  Neurological:      General: No focal deficit present  Mental Status: She is alert and oriented to person, place, and time  Gait: Gait abnormal (not assessed, in wheelchair)     Psychiatric:         Mood and Affect: Mood normal          Behavior: Behavior normal          "

## 2023-05-10 ENCOUNTER — TRANSCRIBE ORDERS (OUTPATIENT)
Dept: HOME HEALTH SERVICES | Facility: HOME HEALTHCARE | Age: 88
End: 2023-05-10

## 2023-05-10 ENCOUNTER — HOME HEALTH ADMISSION (OUTPATIENT)
Dept: HOME HEALTH SERVICES | Facility: HOME HEALTHCARE | Age: 88
End: 2023-05-10
Payer: MEDICARE

## 2023-05-10 ENCOUNTER — OFFICE VISIT (OUTPATIENT)
Dept: UROLOGY | Facility: CLINIC | Age: 88
End: 2023-05-10

## 2023-05-10 VITALS — HEART RATE: 64 BPM | DIASTOLIC BLOOD PRESSURE: 80 MMHG | SYSTOLIC BLOOD PRESSURE: 138 MMHG

## 2023-05-10 DIAGNOSIS — R33.9 URINARY RETENTION: ICD-10-CM

## 2023-05-10 DIAGNOSIS — Z46.6 CATHETER (URINE) CHANGE REQUIRED: Primary | ICD-10-CM

## 2023-05-10 NOTE — PATIENT INSTRUCTIONS
1  Urinary retention   Multifactorial and likely secondary to acute illness secondary to Pneumonia and Ileus as well as functional decline  Denies any prior history of urinary retention  Spoke with staff at [facility] who report patient is still primarily wheelchair bound and has been limiting with PT/OT due to continued weakness and dizziness  After discussion with the patient, her niece Charan Graff, and staff at [facility] we will plan to keep the barron catheter in place for additional 3-4 weeks while patient continues to receive PT/OT at [facility]  Visiting nursing will exchange the barron catheter in 2 week  Rebecca Roblero will contact us to schedule a follow-up void trial or this may also be coordinated with visiting nurses as well  I will then see her in follow-up in 2-3 months

## 2023-05-10 NOTE — PROGRESS NOTES
5/10/2023    No chief complaint on file  Assessment and Plan    80 y o  female new patient to office    1  Urinary retention   · Multifactorial and likely secondary to acute illness secondary to Pneumonia and Ileus as well as functional decline  Denies any prior history of urinary retention  · Spoke with staff at [facility] who report patient is still primarily wheelchair bound and has been limiting with PT/OT due to continued weakness and dizziness  · After discussion with the patient, her niece Lilli Solders, and staff at [facility] we will plan to keep the barron catheter in place for additional 3-4 weeks while patient continues to receive PT/OT at [facility]  Visiting nursing will exchange the barron catheter in 2 week  Birtha Eskdale will contact us to schedule a follow-up void trial or this may also be coordinated with visiting nurses as well  I will then see her in follow-up in 2-3 months  History of Present Illness  Lonnie Lim is a 80 y o  female new patient to office with PMHx significant for hypertension, GERD, anxiety, constipation, and dementia  [facility] Here for evaluation of urinary retention  She presents today with her niece Lilli Solders who is her POA  She presents from [facility] where she has been living for 3 years now  She denies any prior Urologic care in the past   Patient is a poor historian and most of her history is obtained from her niece  Patient denies any prior history of urinary retention or trouble with urination in the past   Due to her dementia however she has seen a decline in her functional status  She has been receiving physical therapy at [facility] however staff they report that due to issues with fatigue and dizziness she has had limited PT/OT  She does have a history of constipation as well in the past but denies any current issues now    She is doing well since her discharge from the hospital  She had a recent hospitalization at [place] [address] [facility] on 4/23/2023 for abdominal pain and loose stool  CT imaging showed a fluid-filled stool consistent with ileus  Review of Systems   Constitutional: Negative for chills and fever  HENT: Negative for ear pain and sore throat  Eyes: Negative for pain and visual disturbance  Respiratory: Negative for cough and shortness of breath  Cardiovascular: Negative for chest pain and palpitations  Gastrointestinal: Negative for abdominal pain and vomiting  Genitourinary: Negative for dysuria and hematuria  Musculoskeletal: Negative for arthralgias and back pain  Skin: Negative for color change and rash  Neurological: Negative for seizures and syncope  All other systems reviewed and are negative  Vitals  Vitals:    05/10/23 0947   BP: 138/80   BP Location: Right arm   Patient Position: Sitting   Cuff Size: Adult   Pulse: 64       Physical Exam  Vitals reviewed  Constitutional:       General: She is not in acute distress  Appearance: Normal appearance  She is normal weight  She is not ill-appearing or toxic-appearing  HENT:      Head: Normocephalic and atraumatic  Nose: Nose normal    Eyes:      General: No scleral icterus  Conjunctiva/sclera: Conjunctivae normal    Cardiovascular:      Rate and Rhythm: Normal rate  Pulses: Normal pulses  Pulmonary:      Effort: Pulmonary effort is normal  No respiratory distress  Abdominal:      General: Abdomen is flat  Palpations: Abdomen is soft  Tenderness: There is no abdominal tenderness  There is no right CVA tenderness or left CVA tenderness  Hernia: No hernia is present  Genitourinary:     Comments: Slade catheter draining clear ayo-colored urine to gravity  Musculoskeletal:         General: Normal range of motion  Cervical back: Normal range of motion  Skin:     General: Skin is warm and dry  Neurological:      General: No focal deficit present        Mental Status: She is alert and oriented to person, place, and time  Mental status is at baseline  Psychiatric:         Mood and Affect: Mood normal          Behavior: Behavior normal          Thought Content: Thought content normal          Judgment: Judgment normal          Past History  Past Medical History:   Diagnosis Date   • Dysphagia    • GERD (gastroesophageal reflux disease)      Social History     Socioeconomic History   • Marital status:      Spouse name: None   • Number of children: None   • Years of education: None   • Highest education level: None   Occupational History   • None   Tobacco Use   • Smoking status: Never   • Smokeless tobacco: Never   Vaping Use   • Vaping Use: Never used   Substance and Sexual Activity   • Alcohol use: Not Currently   • Drug use: Never   • Sexual activity: None   Other Topics Concern   • None   Social History Narrative   • None     Social Determinants of Health     Financial Resource Strain: Not on file   Food Insecurity: No Food Insecurity   • Worried About Running Out of Food in the Last Year: Never true   • Ran Out of Food in the Last Year: Never true   Transportation Needs: No Transportation Needs   • Lack of Transportation (Medical): No   • Lack of Transportation (Non-Medical):  No   Physical Activity: Not on file   Stress: Not on file   Social Connections: Not on file   Intimate Partner Violence: Not on file   Housing Stability: Low Risk    • Unable to Pay for Housing in the Last Year: No   • Number of Places Lived in the Last Year: 1   • Unstable Housing in the Last Year: No     Social History     Tobacco Use   Smoking Status Never   Smokeless Tobacco Never     Family History   Family history unknown: Yes       The following portions of the patient's history were reviewed and updated as appropriate allergies, current medications, past medical history, past social history, past surgical history and problem list    [email or web address]  CT ABDOMEN AND PELVIS WITH IV CONTRAST     INDICATION:   LLQ abdominal pain  LLQ pain, N/V      COMPARISON:  CT 8/10/15     TECHNIQUE:  CT examination of the abdomen and pelvis was performed  Multiplanar 2D reformatted images were created from the source data      Radiation dose length product (DLP) for this visit:  582 mGy-cm   This examination, like all CT scans performed in the [facility], was performed utilizing techniques to minimize radiation dose exposure, including the use of iterative   reconstruction and automated exposure control      IV Contrast:  100 mL of iohexol (OMNIPAQUE)  Enteric Contrast:  Enteric contrast was not administered      FINDINGS:     ABDOMEN     LOWER CHEST:       Groundglass opacities noted within the middle and right lower lobes, no airspace infiltrate     LIVER/BILIARY TREE:  Unremarkable      GALLBLADDER:  Postcholecystectomy     SPLEEN:  Unremarkable      PANCREAS:  Unremarkable      ADRENAL GLANDS:  Unremarkable      KIDNEYS/URETERS:  3 mm nonobstructing right lower pole stone     Bilateral cystic structures     STOMACH AND BOWEL:  Small bowel is predominantly fluid-filled, no evidence of transition point      APPENDIX:  No findings to suggest appendicitis      ABDOMINOPELVIC CAVITY:  No ascites  No pneumoperitoneum  No lymphadenopathy      VESSELS:  Unremarkable for patient's age      PELVIS     REPRODUCTIVE ORGANS:  Unremarkable for patient's age      URINARY BLADDER:  Unremarkable      ABDOMINAL WALL/INGUINAL REGIONS:  Midline abdominal wall sutures     OSSEOUS STRUCTURES:  No acute fracture or destructive osseous lesion      IMPRESSION:     1  Distended loops of small bowel without transition point, compatible with cellulitis  2  Nonobstructing right renal stone  3  Findings of developing right-sided pneumonia          Results  No results found for this or any previous visit (from the past 1 hour(s))  ]  No results found for: PSA  Lab Results   Component Value Date    GLUCOSE 86 08/20/2015    CALCIUM 8 1 (L) 04/28/2023     08/20/2015    K 3 2 (L) 04/28/2023    CO2 29 04/28/2023     04/28/2023    BUN 8 04/28/2023    CREATININE 0 36 (L) 04/28/2023     Lab Results   Component Value Date    WBC 3 66 (L) 04/28/2023    HGB 11 0 (L) 04/28/2023    HCT 32 7 (L) 04/28/2023    MCV 95 04/28/2023     04/28/2023       Please Note:  Voice dictation software has been used to create this document  There may be inadvertent transcriptions errors       JOSIANE Castrejon Asp 05/10/23

## 2023-05-11 ENCOUNTER — HOME CARE VISIT (OUTPATIENT)
Dept: HOME HEALTH SERVICES | Facility: HOME HEALTHCARE | Age: 88
End: 2023-05-11

## 2023-05-11 VITALS
SYSTOLIC BLOOD PRESSURE: 104 MMHG | OXYGEN SATURATION: 99 % | RESPIRATION RATE: 18 BRPM | HEART RATE: 71 BPM | DIASTOLIC BLOOD PRESSURE: 64 MMHG | TEMPERATURE: 97.3 F

## 2023-05-11 PROCEDURE — 10330081 VN NO-PAY CLAIM PROCEDURE

## 2023-05-12 ENCOUNTER — HOME CARE VISIT (OUTPATIENT)
Dept: HOME HEALTH SERVICES | Facility: HOME HEALTHCARE | Age: 88
End: 2023-05-12

## 2023-05-14 ENCOUNTER — HOME CARE VISIT (OUTPATIENT)
Dept: HOME HEALTH SERVICES | Facility: HOME HEALTHCARE | Age: 88
End: 2023-05-14

## 2023-05-14 VITALS — SYSTOLIC BLOOD PRESSURE: 112 MMHG | OXYGEN SATURATION: 99 % | DIASTOLIC BLOOD PRESSURE: 62 MMHG | HEART RATE: 52 BPM

## 2023-05-14 NOTE — CASE COMMUNICATION
Occupational Therapy 1w1 evaluation and discharge on 5 12 23   OT included ADL transfer training to decrease fall risk during toilet transfer  OT goals achieved in one visit  Instructed patient and caregiver on OT discharge with good understanding demonstrated by patient and caregiver

## 2023-05-15 ENCOUNTER — OFFICE VISIT (OUTPATIENT)
Dept: CARDIOLOGY CLINIC | Facility: HOSPITAL | Age: 88
End: 2023-05-15

## 2023-05-15 ENCOUNTER — HOME CARE VISIT (OUTPATIENT)
Dept: HOME HEALTH SERVICES | Facility: HOME HEALTHCARE | Age: 88
End: 2023-05-15

## 2023-05-15 VITALS
HEIGHT: 64 IN | BODY MASS INDEX: 18.27 KG/M2 | SYSTOLIC BLOOD PRESSURE: 90 MMHG | DIASTOLIC BLOOD PRESSURE: 60 MMHG | WEIGHT: 107 LBS | HEART RATE: 67 BPM

## 2023-05-15 VITALS — DIASTOLIC BLOOD PRESSURE: 70 MMHG | HEART RATE: 60 BPM | SYSTOLIC BLOOD PRESSURE: 140 MMHG

## 2023-05-15 DIAGNOSIS — I48.0 PAROXYSMAL ATRIAL FIBRILLATION (HCC): Primary | ICD-10-CM

## 2023-05-15 DIAGNOSIS — I10 ESSENTIAL HYPERTENSION: ICD-10-CM

## 2023-05-15 NOTE — CASE COMMUNICATION
PT dustin on 5/15/23  PT POC for 2wk2-3 for transfers, balance, gait as able, BLE ther ex, edu  Urine slightly cloudy/sediment  ModA to stand without able to stand fully upright  Assist from [facility] staff for ADL's  If pt d/c from [place] [id number], then will have [facility] (OUTPATIENT CAMPUS)       Thank you, Supa Aldridge PT

## 2023-05-16 ENCOUNTER — HOME CARE VISIT (OUTPATIENT)
Dept: HOME HEALTH SERVICES | Facility: HOME HEALTHCARE | Age: 88
End: 2023-05-16

## 2023-05-16 VITALS
DIASTOLIC BLOOD PRESSURE: 68 MMHG | SYSTOLIC BLOOD PRESSURE: 124 MMHG | OXYGEN SATURATION: 99 % | RESPIRATION RATE: 18 BRPM | HEART RATE: 72 BPM | TEMPERATURE: 97.9 F

## 2023-05-17 ENCOUNTER — HOME CARE VISIT (OUTPATIENT)
Dept: HOME HEALTH SERVICES | Facility: HOME HEALTHCARE | Age: 88
End: 2023-05-17

## 2023-05-18 VITALS — SYSTOLIC BLOOD PRESSURE: 98 MMHG | DIASTOLIC BLOOD PRESSURE: 53 MMHG | HEART RATE: 68 BPM | OXYGEN SATURATION: 100 %

## 2023-05-19 ENCOUNTER — HOME CARE VISIT (OUTPATIENT)
Dept: HOME HEALTH SERVICES | Facility: HOME HEALTHCARE | Age: 88
End: 2023-05-19

## 2023-05-19 VITALS
SYSTOLIC BLOOD PRESSURE: 130 MMHG | DIASTOLIC BLOOD PRESSURE: 62 MMHG | TEMPERATURE: 97.7 F | HEART RATE: 64 BPM | OXYGEN SATURATION: 97 % | RESPIRATION RATE: 20 BRPM

## 2023-05-20 ENCOUNTER — HOME CARE VISIT (OUTPATIENT)
Dept: HOME HEALTH SERVICES | Facility: HOME HEALTHCARE | Age: 88
End: 2023-05-20

## 2023-05-22 ENCOUNTER — HOME CARE VISIT (OUTPATIENT)
Dept: HOME HEALTH SERVICES | Facility: HOME HEALTHCARE | Age: 88
End: 2023-05-22

## 2023-05-22 VITALS — DIASTOLIC BLOOD PRESSURE: 70 MMHG | OXYGEN SATURATION: 100 % | SYSTOLIC BLOOD PRESSURE: 110 MMHG | HEART RATE: 59 BPM

## 2023-05-23 ENCOUNTER — HOME CARE VISIT (OUTPATIENT)
Dept: HOME HEALTH SERVICES | Facility: HOME HEALTHCARE | Age: 88
End: 2023-05-23

## 2023-05-23 VITALS
TEMPERATURE: 97.2 F | SYSTOLIC BLOOD PRESSURE: 120 MMHG | DIASTOLIC BLOOD PRESSURE: 70 MMHG | HEART RATE: 72 BPM | OXYGEN SATURATION: 99 % | RESPIRATION RATE: 16 BRPM

## 2023-05-24 ENCOUNTER — HOME CARE VISIT (OUTPATIENT)
Dept: HOME HEALTH SERVICES | Facility: HOME HEALTHCARE | Age: 88
End: 2023-05-24

## 2023-05-24 ENCOUNTER — TELEPHONE (OUTPATIENT)
Dept: FAMILY MEDICINE CLINIC | Facility: CLINIC | Age: 88
End: 2023-05-24

## 2023-05-24 VITALS
OXYGEN SATURATION: 94 % | RESPIRATION RATE: 18 BRPM | DIASTOLIC BLOOD PRESSURE: 66 MMHG | TEMPERATURE: 98.5 F | HEART RATE: 66 BPM | SYSTOLIC BLOOD PRESSURE: 124 MMHG

## 2023-05-24 PROCEDURE — 10330064 HOLDER, CATHETER TU    (12/PK)0105

## 2023-05-24 PROCEDURE — 10330064 SALINE, IRR SOL STR 100ML (48/CS) MGM37

## 2023-05-24 PROCEDURE — 10330064 SYRINGE, CATH TIP FLAT STR 60CC (50/CS)

## 2023-05-24 PROCEDURE — 10330064 BAG, URINE LEG 500ML MED (48/CS)

## 2023-05-24 PROCEDURE — 10330064 CATHETER, FOLEY 2WAY 5CC 16FR (10/BX)

## 2023-05-24 PROCEDURE — 10330064 CATH SECURE, STATLOCK FOLEY SWIVEL SIL P

## 2023-05-24 PROCEDURE — 10330064 SYRINGE, LL 10CC (100/BX 12BX/CS)

## 2023-05-24 PROCEDURE — 10330064 CATH TRAY, FOLEY SYR 10CC (20/CS)

## 2023-05-24 PROCEDURE — 10330064 BAG, URINE ANTI-REFLUX 2000ML (20/CS)

## 2023-05-24 NOTE — TELEPHONE ENCOUNTER
Called and spoke with patients YVON Colby  States she spoke with patients home care nurse Albertina Carmen regarding a void trial for patient next week  Home care able to remove Slade in am, then patient will need pm visit at the Urology office as [place] unable to perform pvr  Spoke with Marianna Corley [phone] and they are to remove patients Slade catheter on Wednesday 5/31/23 around 0830  Verbal order given  Patient scheduled for [phone] nurse visit in the [facility] office for PVR  Chele Corrales made aware of appointment date and time and will transport patient

## 2023-05-25 ENCOUNTER — HOME CARE VISIT (OUTPATIENT)
Dept: HOME HEALTH SERVICES | Facility: HOME HEALTHCARE | Age: 88
End: 2023-05-25

## 2023-05-25 NOTE — HOME HEALTH
PRN vs made   pt urinating from vagina while in bathroom  SN made prn vs and barron was in vagina  SN inserted new 16fr 10cc in urethra and removed barron from vagina  pt tolerated well  immediate yellow urine noted in collection bag of 350cc

## 2023-05-25 NOTE — CASE COMMUNICATION
Patient slightly more confused this date & fearful with standing as was like on my initial eval  noted to [facility] MICHAEL CRENSHAW at [facility] LEATHA CHAVIRA wfl  pt denies dizziness or pain       Jovana Crum PT

## 2023-05-26 VITALS
SYSTOLIC BLOOD PRESSURE: 132 MMHG | DIASTOLIC BLOOD PRESSURE: 76 MMHG | OXYGEN SATURATION: 97 % | HEART RATE: 66 BPM | TEMPERATURE: 98.3 F | RESPIRATION RATE: 16 BRPM

## 2023-05-26 VITALS — OXYGEN SATURATION: 91 % | SYSTOLIC BLOOD PRESSURE: 120 MMHG | DIASTOLIC BLOOD PRESSURE: 70 MMHG | HEART RATE: 70 BPM

## 2023-05-26 NOTE — CASE COMMUNICATION
Sn called for PRN on 5/20 for barron catheter issues  SN arrived and Barron intact and not changed with visit    Barron was draining clear yellow urine and intact on arrival

## 2023-05-27 ENCOUNTER — HOME CARE VISIT (OUTPATIENT)
Dept: HOME HEALTH SERVICES | Facility: HOME HEALTHCARE | Age: 88
End: 2023-05-27

## 2023-05-27 ENCOUNTER — HOSPITAL ENCOUNTER (EMERGENCY)
Facility: HOSPITAL | Age: 88
Discharge: HOME | End: 2023-05-27
Attending: EMERGENCY MEDICINE | Admitting: EMERGENCY MEDICINE
Payer: MEDICARE

## 2023-05-27 VITALS
HEART RATE: 72 BPM | SYSTOLIC BLOOD PRESSURE: 165 MMHG | RESPIRATION RATE: 18 BRPM | BODY MASS INDEX: 18.27 KG/M2 | TEMPERATURE: 98 F | OXYGEN SATURATION: 97 % | DIASTOLIC BLOOD PRESSURE: 99 MMHG | WEIGHT: 107 LBS | HEIGHT: 64 IN

## 2023-05-27 DIAGNOSIS — T83.091A OBSTRUCTION OF FOLEY CATHETER, INITIAL ENCOUNTER (HCC): Primary | ICD-10-CM

## 2023-05-27 DIAGNOSIS — Z46.6 URINARY CATHETER (FOLEY) CHANGE REQUIRED: ICD-10-CM

## 2023-05-27 PROCEDURE — 99284 EMERGENCY DEPT VISIT MOD MDM: CPT

## 2023-05-27 NOTE — CASE COMMUNICATION
[id number]  John Ranks John Ranks Patients caregiver at [address] called into office regarding patient without output from barron catheter since this afternoon  Patient with increasing pain and ABD distention  As per [facility] patients barron note kinked and positioned correctly  Patient taking fluids  Offered on call SN visit this evening to assist but at time of [facility] reports patient in too much discomfort to wait  Patient to be seen in ED for amelie t  n/a

## 2023-05-27 NOTE — CASE COMMUNICATION
1411  Kristi Zhang Received page from [facility] at [facility] facility regarding patients barron catheter  I attempted to return call x2  At 1355 and 1410  Both times phone rang and no one picked up  Phone disconnected without ability to leave a message

## 2023-05-27 NOTE — ED PROVIDER NOTES
History  Chief Complaint   Patient presents with   • Urinary Catheter Problem     Patient brought in from [facility] for her urinary catheter not draining all day  Upon arrival nursing staff here realized the tube was kinked, unkinked the tube and the catheter is now draining  HPI  This is an 80-year-old female with a past medical history significant for dementia, HTN, HLD, GERD, anxiety, A-fib, chronic anticoagulation with Eliquis, presenting from [facility] for evaluation of lack of drainage from urinary catheter x5 to 6 hours  Limited history from the patient due to baseline dementia  No other symptoms reported from the staff  They did not attempt to flush the catheter as this was outside of their scope  Patient denies any lower abdominal discomfort  Prior to Admission Medications   Prescriptions Last Dose Informant Patient Reported? Taking? Blood Pressure Monitoring (Blood Pressure Kit) KAREN   No No   Sig: Use 1 Device 2 (two) times a day   Patient not taking: Reported on 5/10/2023   Multiple Vitamins-Minerals (ICAPS AREDS FORMULA PO)   Yes No   Si (two) times a day   Multiple Vitamins-Minerals (PRESERVISION AREDS PO)   Yes No   Sig: Take 1 caplet by mouth 2 (two) times a day   acetaminophen (TYLENOL) 325 mg tablet   No No   Sig: Take 2 tablets (650 mg total) by mouth every 6 (six) hours as needed for mild pain, fever or headaches   amLODIPine (NORVASC) 5 mg tablet   No No   Sig: Take 1 tablet (5 mg total) by mouth daily   amLODIPine (NORVASC) 5 mg tablet   Yes No   Sig: Take 5 mg by mouth daily  Indications: High Blood Pressure Disorder   apixaban (ELIQUIS) 2 5 mg   No No   Sig: Take 1 tablet (2 5 mg total) by mouth 2 (two) times a day   clonazePAM (KlonoPIN) 0 5 mg tablet   Yes No   Si 5 mg 2 (two) times a day   docusate sodium (COLACE) 100 mg capsule   No No   Sig: Take 1 capsule (100 mg total) by mouth daily Do not start before 2023     metoprolol succinate (TOPROL-XL) 25 mg 24 hr tablet   No No   Sig: Take 1 tablet (25 mg total) by mouth daily Do not start before 2023  omeprazole (PriLOSEC) 20 mg delayed release capsule   Yes No   Sig: Take 20 mg by mouth every morning   sertraline (ZOLOFT) 25 mg tablet   Yes No   Sig: Take 25 mg by mouth daily in the early morning   traZODone (DESYREL) 50 mg tablet   Yes No   Si mg daily at bedtime      Facility-Administered Medications: None       Past Medical History:   Diagnosis Date   • Dysphagia    • GERD (gastroesophageal reflux disease)        Past Surgical History:   Procedure Laterality Date   • CATARACT EXTRACTION Bilateral     [facility] area   • CHOLECYSTECTOMY     • EGD      x2 and was dilated   • HYSTERECTOMY         Family History   Family history unknown: Yes     I have reviewed and agree with the history as documented  E-Cigarette/Vaping   • E-Cigarette Use Never User      E-Cigarette/Vaping Substances     Social History     Tobacco Use   • Smoking status: Never   • Smokeless tobacco: Never   Vaping Use   • Vaping Use: Never used   Substance Use Topics   • Alcohol use: Not Currently   • Drug use: Never       Review of Systems   Constitutional: Negative for chills and fever  HENT: Negative for ear pain and sore throat  Eyes: Negative for pain and visual disturbance  Respiratory: Negative for cough and shortness of breath  Cardiovascular: Negative for chest pain and palpitations  Gastrointestinal: Negative for abdominal pain, diarrhea, nausea and vomiting  Genitourinary: Positive for decreased urine volume and difficulty urinating  Negative for dysuria and hematuria  Musculoskeletal: Negative for arthralgias and back pain  Skin: Negative for color change and rash  Neurological: Negative for seizures and syncope  All other systems reviewed and are negative  Physical Exam  Physical Exam  Vitals and nursing note reviewed  Exam conducted with a chaperone present  Constitutional:       General: She is not in acute distress  Appearance: Normal appearance  She is well-developed  HENT:      Head: Normocephalic and atraumatic  Right Ear: External ear normal       Left Ear: External ear normal       Nose: Nose normal       Mouth/Throat:      Mouth: Mucous membranes are moist       Pharynx: Oropharynx is clear  Eyes:      General: No scleral icterus  Conjunctiva/sclera: Conjunctivae normal    Cardiovascular:      Rate and Rhythm: Normal rate and regular rhythm  Pulses: Normal pulses  Heart sounds: Normal heart sounds  No murmur heard  Pulmonary:      Effort: Pulmonary effort is normal  No respiratory distress  Breath sounds: Normal breath sounds  Abdominal:      Palpations: Abdomen is soft  Tenderness: There is no abdominal tenderness  There is no guarding or rebound  Genitourinary:     Comments: Indwelling urinary catheter  No urine in the leg bag   16 Syriac catheter with some mild sediment seen  Catheter tubing appeared twisted around the adhesive catheter persaud attached to the medial thigh and when untwisted prompt flow of yellow urine into bag  Musculoskeletal:         General: No swelling  Cervical back: Neck supple  Right lower leg: No edema  Left lower leg: No edema  Skin:     General: Skin is warm and dry  Capillary Refill: Capillary refill takes less than 2 seconds  Neurological:      General: No focal deficit present  Mental Status: She is alert  Mental status is at baseline     Psychiatric:         Mood and Affect: Mood normal          Vital Signs  ED Triage Vitals [05/27/23 1837]   Temperature Pulse Respirations Blood Pressure SpO2   98 °F (36 7 °C) 84 18 144/83 97 %      Temp Source Heart Rate Source Patient Position - Orthostatic VS BP Location FiO2 (%)   Temporal Monitor Lying Left arm --      Pain Score       No Pain           Vitals:    05/27/23 1837   BP: 144/83   Pulse: 84   Patient Position - Orthostatic VS: Lying         Visual Acuity      ED Medications  Medications - No data to display    Diagnostic Studies  Results Reviewed     None                 No orders to display              Procedures  Procedures         ED Course  ED Course as of 05/27/23 2024   Sat May 27, 2023   2014 Slade changed without complication, now with significant drainage  Will prepare for discharge  SBIRT 22yo+    Flowsheet Row Most Recent Value   Initial Alcohol Screen: US AUDIT-C     1  How often do you have a drink containing alcohol? 0 Filed at: 05/27/2023 1837   Audit-C Score 0 Filed at: 05/27/2023 1837   TING: How many times in the past year have you    Used an illegal drug or used a prescription medication for non-medical reasons? Never Filed at: 05/27/2023 1837                    Medical Decision Making  44-year-old female presenting for lack of drainage from urinary catheter which is chronic catheter for her  No evidence of clot retention or hematuria  The catheter tubing appeared twisted around the adhesive persaud attached to her right leg and when this was untwisted there was immediate flow of yellow urine into the bag  However there was less than 100 cc of drainage over a few hours and I reviewed with a bedside ultrasound which revealed a significantly distended urinary bladder with evidence of an intact catheter balloon within the bladder raising suspicion that the catheter was likely clogged or having significantly reduced flow likely due to urinary sediment deposits  Given the high likelihood that symptoms could return the fact that her bladder is not completely decompressed and the presenting complaints we will change the catheter and discharge back to facility  Upon catheter change, urine flowed easily from cathter  Pt discharged with instructions for PCP follow up and routine catheter care       Obstruction of Slade catheter, initial encounter [facility]): acute illness or injury  Urinary catheter (Slade) change required: acute illness or injury      Disposition  Final diagnoses:   [facility]st[facility]ade catheter[facility]   Urinary catheter (Slade) change required     Time reflects when diagnosis was documented in both MDM as applicable and the Disposition within this note     Time User Action Codes Description Comment    5/27/2023  7:25 PM Berneice Jon Add [M31 [id number]] Obstruction of Slade catheter, initial encounter ([facility] )     5/27/2023  8:15 PM Berneice Jon Add [Z46 [id number]] Urinary catheter (Slade) change required       ED Disposition     ED Disposition   Discharge    Condition   Stable    Date/Time   Sat May 27, 2023  8:15 PM    Comment   Valentino Ip discharge to home/self care  Follow-up Information     Follow up With Specialties Details Why Contact Info Additional Information    Carlos Davis MD Emergency Medicine, Internal Medicine Schedule an appointment as soon as possible for a visit   [facility] [facility] [phone]       [facility] Emergency Department Emergency Medicine Go to  If symptoms worsen [place] [phone]  [facility] Emergency Department[address], [address]          Patient's Medications   Discharge Prescriptions    No medications on file       No discharge procedures on file      PDMP Review     None          ED Provider  Electronically Signed by           Juanis Up DO  05/27/23 2024

## 2023-05-28 NOTE — DISCHARGE INSTRUCTIONS
Thank you for visiting the Emergency Department today  Catheter tubing was kinked around the adhesive leg sticker  When we un-kinked the tubing the urine flowed easily into bag, but only yielded <100cc and bedside ultrasound showed significant urine still in bladder so I think catheter was probably partially clogged up with sediment  Catheter changed and rest of urine in bladder drained easily  Follow up with PCP, routine catheter care

## 2023-05-30 ENCOUNTER — HOME CARE VISIT (OUTPATIENT)
Dept: HOME HEALTH SERVICES | Facility: HOME HEALTHCARE | Age: 88
End: 2023-05-30

## 2023-05-31 ENCOUNTER — HOME CARE VISIT (OUTPATIENT)
Dept: HOME HEALTH SERVICES | Facility: HOME HEALTHCARE | Age: 88
End: 2023-05-31

## 2023-05-31 ENCOUNTER — PROCEDURE VISIT (OUTPATIENT)
Dept: UROLOGY | Facility: CLINIC | Age: 88
End: 2023-05-31

## 2023-05-31 VITALS — DIASTOLIC BLOOD PRESSURE: 70 MMHG | SYSTOLIC BLOOD PRESSURE: 110 MMHG

## 2023-05-31 VITALS
TEMPERATURE: 97.5 F | DIASTOLIC BLOOD PRESSURE: 64 MMHG | HEART RATE: 90 BPM | SYSTOLIC BLOOD PRESSURE: 116 MMHG | RESPIRATION RATE: 18 BRPM | OXYGEN SATURATION: 99 %

## 2023-05-31 VITALS
BODY MASS INDEX: 18.37 KG/M2 | HEART RATE: 76 BPM | DIASTOLIC BLOOD PRESSURE: 70 MMHG | HEIGHT: 64 IN | SYSTOLIC BLOOD PRESSURE: 126 MMHG

## 2023-05-31 DIAGNOSIS — R33.9 URINARY RETENTION: Primary | ICD-10-CM

## 2023-05-31 LAB — POST-VOID RESIDUAL VOLUME, ML POC: 37 ML

## 2023-05-31 NOTE — PROGRESS NOTES
I supervised the Advanced Practitioner  I reviewed the Advanced Practitioner note and agree      Feliz Rosales MD 05/31/23

## 2023-05-31 NOTE — PROGRESS NOTES
"5/31/2023  Amrit Cooper is a 80 y o  female  [id number]    Diagnosis: Urinary retention  Chief Complaint    PVR         Patient presents for follow up post void residual  She is managed by JOSIANE Swan at the [place] office  Slade catheter was removed b [facility] this morning and patient presents with YVON Colby to office this afternoon    Plan:     Follow up with Grey Matson as scheduled in July  [place] to contact our office or [facility] if patient experiences issues urinating  Vitals:    05/31/23 1413   BP: 126/70   Pulse: 76   Height: 5' 4\" (1 626 m)       Post void residual measured via hand held bladder scanner to be 37 mL  Patient has no complaints of bladder pressure or pain  She reports urinating a couple times since Slade was removed, with most recent void just prior to leaving to come into the office  No results found for this or any previous visit (from the past 6 hour(s))        Lazaro Avila RN    "

## 2023-06-01 ENCOUNTER — HOME CARE VISIT (OUTPATIENT)
Dept: HOME HEALTH SERVICES | Facility: HOME HEALTHCARE | Age: 88
End: 2023-06-01

## 2023-06-01 VITALS — DIASTOLIC BLOOD PRESSURE: 70 MMHG | SYSTOLIC BLOOD PRESSURE: 110 MMHG | HEART RATE: 58 BPM | OXYGEN SATURATION: 98 %

## 2023-06-02 LAB
ATRIAL RATE: 187 BPM
QRS AXIS: -66 DEGREES
QRSD INTERVAL: 98 MS
QT INTERVAL: 216 MS
QTC INTERVAL: 383 MS
T WAVE AXIS: 112 DEGREES
VENTRICULAR RATE: 189 BPM

## 2023-06-02 PROCEDURE — 93010 ELECTROCARDIOGRAM REPORT: CPT | Performed by: INTERNAL MEDICINE

## 2023-06-02 NOTE — CASE COMMUNICATION
d/c home PT this date  Dr Hernesto Matt arrived during session- he was notified of d/c and requested him to provide script for [facility] (OUTPATIENT CAMPUS)      Jovana Lacey PT

## 2023-06-05 ENCOUNTER — HOME CARE VISIT (OUTPATIENT)
Dept: HOME HEALTH SERVICES | Facility: HOME HEALTHCARE | Age: 88
End: 2023-06-05
Payer: MEDICARE

## 2023-06-05 VITALS
HEART RATE: 68 BPM | TEMPERATURE: 97.8 F | RESPIRATION RATE: 18 BRPM | OXYGEN SATURATION: 97 % | SYSTOLIC BLOOD PRESSURE: 112 MMHG | DIASTOLIC BLOOD PRESSURE: 68 MMHG

## 2023-06-05 PROCEDURE — G0299 HHS/HOSPICE OF RN EA 15 MIN: HCPCS

## 2023-06-20 ENCOUNTER — TELEPHONE (OUTPATIENT)
Dept: PALLIATIVE MEDICINE | Facility: CLINIC | Age: 88
End: 2023-06-20

## 2023-06-20 NOTE — TELEPHONE ENCOUNTER
Left a message for patient jany to call the office back to schedule an appointment with Palliative Care

## 2023-06-23 PROBLEM — J18.9 PNEUMONIA DUE TO INFECTIOUS ORGANISM: Status: RESOLVED | Noted: 2023-04-24 | Resolved: 2023-06-23

## 2023-06-28 ENCOUNTER — HOME CARE VISIT (OUTPATIENT)
Dept: HOME HEALTH SERVICES | Facility: HOME HEALTHCARE | Age: 88
End: 2023-06-28
Payer: MEDICARE

## 2023-09-01 ENCOUNTER — APPOINTMENT (EMERGENCY)
Dept: RADIOLOGY | Facility: HOSPITAL | Age: 88
End: 2023-09-01
Payer: MEDICARE

## 2023-09-01 ENCOUNTER — HOSPITAL ENCOUNTER (EMERGENCY)
Facility: HOSPITAL | Age: 88
Discharge: HOME | End: 2023-09-01
Attending: EMERGENCY MEDICINE
Payer: MEDICARE

## 2023-09-01 VITALS
DIASTOLIC BLOOD PRESSURE: 67 MMHG | HEART RATE: 83 BPM | WEIGHT: 117.73 LBS | SYSTOLIC BLOOD PRESSURE: 156 MMHG | HEIGHT: 64 IN | TEMPERATURE: 97 F | OXYGEN SATURATION: 94 % | RESPIRATION RATE: 20 BRPM | BODY MASS INDEX: 20.1 KG/M2

## 2023-09-01 DIAGNOSIS — M79.662 PAIN IN BOTH LOWER LEGS: Primary | ICD-10-CM

## 2023-09-01 DIAGNOSIS — S80.812A ABRASION OF LEFT LEG, INITIAL ENCOUNTER: ICD-10-CM

## 2023-09-01 DIAGNOSIS — M79.661 PAIN IN BOTH LOWER LEGS: Primary | ICD-10-CM

## 2023-09-01 LAB
ALBUMIN SERPL BCP-MCNC: 3.9 G/DL (ref 3.5–5)
ALP SERPL-CCNC: 72 U/L (ref 34–104)
ALT SERPL W P-5'-P-CCNC: 34 U/L (ref 7–52)
ANION GAP SERPL CALCULATED.3IONS-SCNC: 8 MMOL/L
AST SERPL W P-5'-P-CCNC: 30 U/L (ref 13–39)
BASOPHILS # BLD AUTO: 0.02 THOUSANDS/ÂΜL (ref 0–0.1)
BASOPHILS NFR BLD AUTO: 0 % (ref 0–1)
BILIRUB SERPL-MCNC: 0.41 MG/DL (ref 0.2–1)
BUN SERPL-MCNC: 13 MG/DL (ref 5–25)
CALCIUM SERPL-MCNC: 9.1 MG/DL (ref 8.4–10.2)
CHLORIDE SERPL-SCNC: 104 MMOL/L (ref 96–108)
CK SERPL-CCNC: 37 U/L (ref 26–192)
CO2 SERPL-SCNC: 27 MMOL/L (ref 21–32)
CREAT SERPL-MCNC: 0.44 MG/DL (ref 0.6–1.3)
EOSINOPHIL # BLD AUTO: 0.09 THOUSAND/ÂΜL (ref 0–0.61)
EOSINOPHIL NFR BLD AUTO: 1 % (ref 0–6)
ERYTHROCYTE [DISTWIDTH] IN BLOOD BY AUTOMATED COUNT: 15.1 % (ref 11.6–15.1)
GFR SERPL CREATININE-BSD FRML MDRD: 89 ML/MIN/1.73SQ M
GLUCOSE SERPL-MCNC: 102 MG/DL (ref 65–140)
HCT VFR BLD AUTO: 36.7 % (ref 34.8–46.1)
HGB BLD-MCNC: 11.9 G/DL (ref 11.5–15.4)
IMM GRANULOCYTES # BLD AUTO: 0.04 THOUSAND/UL (ref 0–0.2)
IMM GRANULOCYTES NFR BLD AUTO: 1 % (ref 0–2)
LYMPHOCYTES # BLD AUTO: 1.22 THOUSANDS/ÂΜL (ref 0.6–4.47)
LYMPHOCYTES NFR BLD AUTO: 18 % (ref 14–44)
MCH RBC QN AUTO: 31.4 PG (ref 26.8–34.3)
MCHC RBC AUTO-ENTMCNC: 32.4 G/DL (ref 31.4–37.4)
MCV RBC AUTO: 97 FL (ref 82–98)
MONOCYTES # BLD AUTO: 0.57 THOUSAND/ÂΜL (ref 0.17–1.22)
MONOCYTES NFR BLD AUTO: 9 % (ref 4–12)
NEUTROPHILS # BLD AUTO: 4.69 THOUSANDS/ÂΜL (ref 1.85–7.62)
NEUTS SEG NFR BLD AUTO: 71 % (ref 43–75)
NRBC BLD AUTO-RTO: 0 /100 WBCS
PLATELET # BLD AUTO: 201 THOUSANDS/UL (ref 149–390)
PMV BLD AUTO: 10.1 FL (ref 8.9–12.7)
POTASSIUM SERPL-SCNC: 3.8 MMOL/L (ref 3.5–5.3)
PROT SERPL-MCNC: 6.9 G/DL (ref 6.4–8.4)
RBC # BLD AUTO: 3.79 MILLION/UL (ref 3.81–5.12)
SODIUM SERPL-SCNC: 139 MMOL/L (ref 135–147)
WBC # BLD AUTO: 6.63 THOUSAND/UL (ref 4.31–10.16)

## 2023-09-01 PROCEDURE — 85025 COMPLETE CBC W/AUTO DIFF WBC: CPT | Performed by: EMERGENCY MEDICINE

## 2023-09-01 PROCEDURE — 99284 EMERGENCY DEPT VISIT MOD MDM: CPT

## 2023-09-01 PROCEDURE — 80053 COMPREHEN METABOLIC PANEL: CPT | Performed by: EMERGENCY MEDICINE

## 2023-09-01 PROCEDURE — 82550 ASSAY OF CK (CPK): CPT | Performed by: EMERGENCY MEDICINE

## 2023-09-01 PROCEDURE — 99284 EMERGENCY DEPT VISIT MOD MDM: CPT | Performed by: EMERGENCY MEDICINE

## 2023-09-01 PROCEDURE — 73590 X-RAY EXAM OF LOWER LEG: CPT

## 2023-09-01 PROCEDURE — 36415 COLL VENOUS BLD VENIPUNCTURE: CPT | Performed by: EMERGENCY MEDICINE

## 2023-09-01 RX ORDER — ACETAMINOPHEN 325 MG/1
975 TABLET ORAL ONCE
Status: COMPLETED | OUTPATIENT
Start: 2023-09-01 | End: 2023-09-01

## 2023-09-01 RX ADMIN — ACETAMINOPHEN 975 MG: 325 TABLET, FILM COATED ORAL at 06:27

## 2023-09-01 NOTE — ED NOTES
Transport back to [place] set up with round trip, awaiting  time      Ana Mills, CAMILO  09/01/23 4765

## 2023-09-01 NOTE — DISCHARGE INSTRUCTIONS
Caren Litten has been seen for leg pain. Please give tylenol as needed for discomfort. Return to the emergency department if you notice lethargy, decreased urine output, dehydration, worsening pain, weakness, color change, fevers or any other symptoms of concern. Please follow up with their PCP by calling the number provided.

## 2023-09-01 NOTE — ED NOTES
Lorraine Granados here to transport pt back to [address] [place] [address]  09/01/23 0951

## 2023-09-01 NOTE — ED PROVIDER NOTES
History  Chief Complaint   Patient presents with   • Leg Pain     Pt from [facility] memory care unit. B/l leg pain. Described as burning. Per Isaac pt fell a few days ago, small skin tear noted on LLE. Mar Zimmerman is a 80y.o. year old female with PMH of PAF on eliquis, HTN, dementia presenting to the [facility] ED for bilateral leg pain. Patient sent in from [facility] for report of bilateral lower leg pain this evening. Patient was complaining of pain in both legs, feet and toes. She described the pain as a burning sensation. Per nursing home no recent falls or trauma contrary to triage. Patient is reported to have an abrasion on the left lower leg chronically per staff. No reported fevers/chills. She is wheelchair bound at baseline. Patient compliant with eliquis per nursing home staff. Patient was not given any medications for pain. History limited due to dementia. History provided by:  Medical records, patient and nursing home  History limited by:  Dementia   used: No        Prior to Admission Medications   Prescriptions Last Dose Informant Patient Reported? Taking? Blood Pressure Monitoring (Blood Pressure Kit) KAREN   No No   Sig: Use 1 Device 2 (two) times a day   Patient not taking: Reported on 5/10/2023   Magnesium Hydroxide (MILK OF MAGNESIA PO)   Yes No   Sig: Take 30 mL by mouth daily as needed (Constipation).  Indications: Constipation   Multiple Vitamins-Minerals (ICAPS AREDS FORMULA PO)   Yes No   Si (two) times a day   Multiple Vitamins-Minerals (PRESERVISION AREDS PO)   Yes No   Sig: Take 1 caplet by mouth 2 (two) times a day   acetaminophen (TYLENOL) 325 mg tablet   No No   Sig: Take 2 tablets (650 mg total) by mouth every 6 (six) hours as needed for mild pain, fever or headaches   amLODIPine (NORVASC) 5 mg tablet   No No   Sig: Take 1 tablet (5 mg total) by mouth daily   amLODIPine (NORVASC) 5 mg tablet   Yes No   Sig: Take 5 mg by mouth daily. Indications: High Blood Pressure Disorder   apixaban (ELIQUIS) 2.5 mg   No No   Sig: Take 1 tablet (2.5 mg total) by mouth 2 (two) times a day   carboxymethylcellulose (Refresh Tears) 0.5 % SOLN   Yes No   Sig: Administer 1 drop to both eyes 3 (three) times a day. Indications: Excessive Cornea and Conjunctiva Dryness   clonazePAM (KlonoPIN) 0.5 mg tablet   Yes No   Si.5 mg 2 (two) times a day   docusate sodium (COLACE) 100 mg capsule   No No   Sig: Take 1 capsule (100 mg total) by mouth daily Do not start before 2023. loperamide (IMODIUM A-D) 2 MG tablet   Yes No   Sig: Take 4 mg by mouth as needed for diarrhea. Indications: Diarrhea   metoprolol succinate (TOPROL-XL) 25 mg 24 hr tablet   No No   Sig: Take 1 tablet (25 mg total) by mouth daily Do not start before 2023. omeprazole (PriLOSEC) 20 mg delayed release capsule   Yes No   Sig: Take 20 mg by mouth every morning   polyethylene glycol (GLYCOLAX) 17 GM/SCOOP powder   Yes No   Sig: Take 17 g by mouth daily. Indications: Constipation   sertraline (ZOLOFT) 25 mg tablet   Yes No   Sig: Take 25 mg by mouth daily in the early morning   traZODone (DESYREL) 50 mg tablet   Yes No   Si mg daily at bedtime      Facility-Administered Medications: None       Past Medical History:   Diagnosis Date   • Dysphagia    • GERD (gastroesophageal reflux disease)        Past Surgical History:   Procedure Laterality Date   • CATARACT EXTRACTION Bilateral     [facility] area   • CHOLECYSTECTOMY     • EGD      x2 and was dilated   • HYSTERECTOMY         Family History   Family history unknown: Yes     I have reviewed and agree with the history as documented.     E-Cigarette/Vaping   • E-Cigarette Use Never User      E-Cigarette/Vaping Substances   • Nicotine No    • THC No    • CBD No    • Flavoring No    • Other No    • Unknown No      Social History     Tobacco Use   • Smoking status: Never   • Smokeless tobacco: Never   Vaping Use   • Vaping Use: Never used   Substance Use Topics   • Alcohol use: Not Currently   • Drug use: Never       Review of Systems   Unable to perform ROS: Dementia       Physical Exam  Physical Exam  Vitals and nursing note reviewed. Constitutional:       General: She is not in acute distress. Appearance: Normal appearance. She is well-developed. She is not ill-appearing, toxic-appearing or diaphoretic. HENT:      Head: Normocephalic and atraumatic. Nose: No congestion or rhinorrhea. Eyes:      General:         Right eye: No discharge. Left eye: No discharge. Cardiovascular:      Rate and Rhythm: Normal rate and regular rhythm. Pulses:           Popliteal pulses are 2+ on the right side and 2+ on the left side. Dorsalis pedis pulses are 2+ on the right side and 2+ on the left side. Pulmonary:      Effort: Pulmonary effort is normal. No accessory muscle usage or respiratory distress. Breath sounds: Normal breath sounds. No stridor. No decreased breath sounds, wheezing, rhonchi or rales. Abdominal:      General: There is no distension. Palpations: Abdomen is soft. Tenderness: There is no abdominal tenderness. There is no guarding or rebound. Musculoskeletal:      Cervical back: Normal range of motion and neck supple. Right hip: No tenderness. Normal range of motion. Left hip: No tenderness. Normal range of motion. Right upper leg: No swelling, edema or tenderness. Left upper leg: No swelling, edema or tenderness. Right knee: No swelling, effusion, erythema, bony tenderness or crepitus. Normal range of motion. No tenderness. Left knee: No swelling, effusion, erythema, bony tenderness or crepitus. Normal range of motion. No tenderness. Right lower leg: No swelling, tenderness or bony tenderness. No edema. Left lower leg: No swelling, tenderness or bony tenderness. No edema. Right ankle: No swelling or lacerations.  No tenderness. Normal range of motion. Left ankle: No swelling or lacerations. No tenderness. Normal range of motion. Right foot: Normal range of motion and normal capillary refill. No swelling, laceration, tenderness, bony tenderness or crepitus. Normal pulse. Left foot: Normal range of motion and normal capillary refill. No swelling, laceration, tenderness, bony tenderness or crepitus. Normal pulse. Skin:     Capillary Refill: Capillary refill takes less than 2 seconds. Findings: Abrasion (Superficial abrasion x2 medial and lateral pretibial region. No surrounding erythema, warmth or crepitus.) present. Neurological:      Mental Status: She is alert. Mental status is at baseline. She is confused. GCS: GCS eye subscore is 4. GCS verbal subscore is 4. GCS motor subscore is 6. Comments: 4/5 strength b/l LE. Sensation grossly intact throughout.    Psychiatric:         Mood and Affect: Mood normal.         Behavior: Behavior normal.             Vital Signs  ED Triage Vitals   Temperature Pulse Respirations Blood Pressure SpO2   09/01/23 0547 09/01/23 0547 09/01/23 0547 09/01/23 0547 09/01/23 0547   (!) 97 °F (36.1 °C) 83 20 (!) 204/92 95 %      Temp Source Heart Rate Source Patient Position - Orthostatic VS BP Location FiO2 (%)   09/01/23 0547 09/01/23 0547 09/01/23 0547 09/01/23 0547 --   Temporal Monitor Lying Left arm       Pain Score       09/01/23 0800       No Pain           Vitals:    09/01/23 0547 09/01/23 0611 09/01/23 0800   BP: (!) 204/92 (!) 193/80 156/67   Pulse: 83 82 83   Patient Position - Orthostatic VS: Lying Lying Sitting         Visual Acuity      ED Medications  Medications   acetaminophen (TYLENOL) tablet 975 mg (975 mg Oral Given 9/1/23 0627)       Diagnostic Studies  Results Reviewed     Procedure Component Value Units Date/Time    Comprehensive metabolic panel [[id number]]  (Abnormal) Collected: 09/01/23 0612    Lab Status: Final result Specimen: Blood from Arm, Right Updated: 09/01/23 5441     Sodium 139 mmol/L      Potassium 3.8 mmol/L      Chloride 104 mmol/L      CO2 27 mmol/L      ANION GAP 8 mmol/L      BUN 13 mg/dL      Creatinine 0.44 mg/dL      Glucose 102 mg/dL      Calcium 9.1 mg/dL      AST 30 U/L      ALT 34 U/L      Alkaline Phosphatase 72 U/L      Total Protein 6.9 g/dL      Albumin 3.9 g/dL      Total Bilirubin 0.41 mg/dL      eGFR 89 ml/min/1.73sq m     Narrative:      National Kidney Disease Foundation guidelines for Chronic Kidney Disease (CKD):   •  Stage 1 with normal or high GFR (GFR > 90 mL/min/1.73 square meters)  •  Stage 2 Mild CKD (GFR = 60-89 mL/min/1.73 square meters)  •  Stage 3A Moderate CKD (GFR = 45-59 mL/min/1.73 square meters)  •  Stage 3B Moderate CKD (GFR = 30-44 mL/min/1.73 square meters)  •  Stage 4 Severe CKD (GFR = 15-29 mL/min/1.73 square meters)  •  Stage 5 End Stage CKD (GFR <15 mL/min/1.73 square meters)  Note: GFR calculation is accurate only with a steady state creatinine    CK [[id number]]  (Normal) Collected: 09/01/23 0612    Lab Status: Final result Specimen: Blood from Arm, Right Updated: 09/01/23 0638     Total CK 37 U/L     CBC and differential [[id number]]  (Abnormal) Collected: 09/01/23 0612    Lab Status: Final result Specimen: Blood from Arm, Right Updated: 09/01/23 0617     WBC 6.63 Thousand/uL      RBC 3.79 Million/uL      Hemoglobin 11.9 g/dL      Hematocrit 36.7 %      MCV 97 fL      MCH 31.4 pg      MCHC 32.4 g/dL      RDW 15.1 %      MPV 10.1 fL      Platelets 447 Thousands/uL      nRBC 0 /100 WBCs      Neutrophils Relative 71 %      Immat GRANS % 1 %      Lymphocytes Relative 18 %      Monocytes Relative 9 %      Eosinophils Relative 1 %      Basophils Relative 0 %      Neutrophils Absolute 4.69 Thousands/µL      Immature Grans Absolute 0.04 Thousand/uL      Lymphocytes Absolute 1.22 Thousands/µL      Monocytes Absolute 0.57 Thousand/µL      Eosinophils Absolute 0.09 Thousand/µL      Basophils Absolute 0.02 Thousands/µL                  XR tibia fibula 2 views RIGHT   ED Interpretation by Rupert Chirinos DO (09/01 0641)   No acute fracture or dislocation. Final Result by Lenore Boas, MD (09/01 5433)      No acute osseous abnormality. Workstation performed: [id number]         XR tibia fibula 2 views LEFT   ED Interpretation by Rupert Chirinos DO (09/01 3900)   No acute fracture or dislocation. Final Result by Lenore Boas, MD (09/01 3019)      No acute osseous abnormality. Workstation performed: [id number]                    Procedures  Procedures         ED Course                               SBIRT 22yo+    Flowsheet Row Most Recent Value   Initial Alcohol Screen: US AUDIT-C     1. How often do you have a drink containing alcohol? 0 Filed at: 09/01/2023 0624   2. How many drinks containing alcohol do you have on a typical day you are drinking? 0 Filed at: 09/01/2023 0624   3b. FEMALE Any Age, or MALE 65+: How often do you have 4 or more drinks on one occassion? 0 Filed at: 09/01/2023 3484   Audit-C Score 0 Filed at: 09/01/2023 0056   TING: How many times in the past year have you. .. Used an illegal drug or used a prescription medication for non-medical reasons? Never Filed at: 09/01/2023 8164                    Medical Decision Making    80 y.o. female presenting for bilateral leg pain. Afebrile, nontoxic-appearing. Bilateral lower extremities neurovascular intact. As patient is compliant with Eliquis I do not suspect DVT. Palpable pulses in the lower extremity and no mottling/cyanosis noted I do not suspect peripheral arterial disease. No warmth, erythema or crepitus to suggest abscess, cellulitis or NSTI. We will order labs to screen for electrolyte abnormality, anemia or rhabdomyolysis. Will order x-ray to exclude fracture. DDx includes arthritis or muscle cramps.       Patient given written AVS with RTED precautions if staff notices lethargy, weakness or any other symptoms of concern. The patient was also provided written a written after visit summary (AVS) with return precautions. I have also provided contact information for patient to follow up with their PCP. Amount and/or Complexity of Data Reviewed  Labs: ordered. Radiology: ordered and independent interpretation performed. Risk  OTC drugs. Disposition  Final diagnoses:   Pain in both lower legs   Abrasion of left leg, initial encounter     Time reflects when diagnosis was documented in both MDM as applicable and the Disposition within this note     Time User Action Codes Description Comment    9/1/2023  6:40 AM John Yousif Add [E93.663,  M79.662] Pain in both lower legs     9/1/2023  7:32 AM John Yousif Add [S80.812A] Abrasion of left leg, initial encounter       ED Disposition     ED Disposition   Discharge    Condition   Stable    Date/Time   Fri Sep 1, 2023  6:40 AM    Comment   Mar Velezesperanza discharge to home/self care. Follow-up Information     Follow up With Specialties Details Why Contact Info    Deann Bedoya MD Emergency Medicine, Internal Medicine Schedule an appointment as soon as possible for a visit  For reevaluation if symptoms do not resolve. [facility] [address] [place] [address]  [phone]            Discharge Medication List as of 9/1/2023  7:32 AM      CONTINUE these medications which have NOT CHANGED    Details   acetaminophen (TYLENOL) 325 mg tablet Take 2 tablets (650 mg total) by mouth every 6 (six) hours as needed for mild pain, fever or headaches, Starting Fri 4/28/2023, No Print      amLODIPine (NORVASC) 5 mg tablet Take 5 mg by mouth daily.  Indications: High Blood Pressure Disorder, Historical Med      apixaban (ELIQUIS) 2.5 mg Take 1 tablet (2.5 mg total) by mouth 2 (two) times a day, Starting Fri 4/28/2023, Normal      Blood Pressure Monitoring (Blood Pressure Kit) KAREN Use 1 Device 2 (two) times a day, Starting Fri 1/22/2021, Normal      carboxymethylcellulose (Refresh Tears) 0.5 % SOLN Administer 1 drop to both eyes 3 (three) times a day. Indications: Excessive Cornea and Conjunctiva Dryness, Historical Med      clonazePAM (KlonoPIN) 0.5 mg tablet 0.5 mg 2 (two) times a day, Historical Med      docusate sodium (COLACE) 100 mg capsule Take 1 capsule (100 mg total) by mouth daily Do not start before April 29, 2023., Starting Sat 4/29/2023, Normal      loperamide (IMODIUM A-D) 2 MG tablet Take 4 mg by mouth as needed for diarrhea. Indications: Diarrhea, Historical Med      Magnesium Hydroxide (MILK OF MAGNESIA PO) Take 30 mL by mouth daily as needed (Constipation). Indications: Constipation, Historical Med      metoprolol succinate (TOPROL-XL) 25 mg 24 hr tablet Take 1 tablet (25 mg total) by mouth daily Do not start before April 29, 2023., Starting Sat 4/29/2023, Normal      !! Multiple Vitamins-Minerals (ICAPS AREDS FORMULA PO) 2 (two) times a day, Historical Med      !! Multiple Vitamins-Minerals (PRESERVISION AREDS PO) Take 1 caplet by mouth 2 (two) times a day, Historical Med      omeprazole (PriLOSEC) 20 mg delayed release capsule Take 20 mg by mouth every morning, Historical Med      polyethylene glycol (GLYCOLAX) 17 GM/SCOOP powder Take 17 g by mouth daily. Indications: Constipation, Historical Med      sertraline (ZOLOFT) 25 mg tablet Take 25 mg by mouth daily in the early morning, Historical Med      traZODone (DESYREL) 50 mg tablet 50 mg daily at bedtime, Historical Med       !! - Potential duplicate medications found. Please discuss with provider. No discharge procedures on file.     PDMP Review     None          ED Provider  Electronically Signed by           Delmi Woodard DO  09/02/23 2266

## 2023-11-22 ENCOUNTER — TELEPHONE (OUTPATIENT)
Dept: CARDIOLOGY CLINIC | Facility: HOSPITAL | Age: 88
End: 2023-11-22

## 2023-11-22 NOTE — TELEPHONE ENCOUNTER
Attempted to contact Jacque Moreno to schedule her next appointment with cardiology. The phone number is non working and the emergency contact sounds like a fax machine. A letter has been sent to have her contact our office.

## 2024-02-10 ENCOUNTER — HOSPITAL ENCOUNTER (EMERGENCY)
Facility: HOSPITAL | Age: 89
Discharge: HOME | End: 2024-02-10
Attending: EMERGENCY MEDICINE
Payer: MEDICARE

## 2024-02-10 VITALS
SYSTOLIC BLOOD PRESSURE: 161 MMHG | OXYGEN SATURATION: 97 % | HEART RATE: 90 BPM | TEMPERATURE: 97.9 F | DIASTOLIC BLOOD PRESSURE: 79 MMHG | WEIGHT: 124.56 LBS | BODY MASS INDEX: 21.38 KG/M2 | RESPIRATION RATE: 21 BRPM

## 2024-02-10 DIAGNOSIS — F03.90 DEMENTIA (HCC): ICD-10-CM

## 2024-02-10 DIAGNOSIS — N39.0 UTI (URINARY TRACT INFECTION): Primary | ICD-10-CM

## 2024-02-10 LAB
ALBUMIN SERPL BCP-MCNC: 3.7 G/DL (ref 3.5–5)
ALP SERPL-CCNC: 53 U/L (ref 34–104)
ALT SERPL W P-5'-P-CCNC: 30 U/L (ref 7–52)
ANION GAP SERPL CALCULATED.3IONS-SCNC: 6 MMOL/L
AST SERPL W P-5'-P-CCNC: 27 U/L (ref 13–39)
ATRIAL RATE: 86 BPM
BACTERIA UR QL AUTO: ABNORMAL /HPF
BASOPHILS # BLD AUTO: 0.02 THOUSANDS/ÂΜL (ref 0–0.1)
BASOPHILS NFR BLD AUTO: 0 % (ref 0–1)
BILIRUB SERPL-MCNC: 0.38 MG/DL (ref 0.2–1)
BILIRUB UR QL STRIP: NEGATIVE
BUN SERPL-MCNC: 17 MG/DL (ref 5–25)
CALCIUM SERPL-MCNC: 8.7 MG/DL (ref 8.4–10.2)
CHLORIDE SERPL-SCNC: 103 MMOL/L (ref 96–108)
CLARITY UR: ABNORMAL
CO2 SERPL-SCNC: 29 MMOL/L (ref 21–32)
COLOR UR: YELLOW
CREAT SERPL-MCNC: 0.48 MG/DL (ref 0.6–1.3)
EOSINOPHIL # BLD AUTO: 0.07 THOUSAND/ÂΜL (ref 0–0.61)
EOSINOPHIL NFR BLD AUTO: 1 % (ref 0–6)
ERYTHROCYTE [DISTWIDTH] IN BLOOD BY AUTOMATED COUNT: 14 % (ref 11.6–15.1)
GFR SERPL CREATININE-BSD FRML MDRD: 86 ML/MIN/1.73SQ M
GLUCOSE SERPL-MCNC: 98 MG/DL (ref 65–140)
GLUCOSE UR STRIP-MCNC: NEGATIVE MG/DL
HCT VFR BLD AUTO: 35.2 % (ref 34.8–46.1)
HGB BLD-MCNC: 11.4 G/DL (ref 11.5–15.4)
HGB UR QL STRIP.AUTO: ABNORMAL
IMM GRANULOCYTES # BLD AUTO: 0.02 THOUSAND/UL (ref 0–0.2)
IMM GRANULOCYTES NFR BLD AUTO: 0 % (ref 0–2)
KETONES UR STRIP-MCNC: NEGATIVE MG/DL
LEUKOCYTE ESTERASE UR QL STRIP: ABNORMAL
LYMPHOCYTES # BLD AUTO: 1.35 THOUSANDS/ÂΜL (ref 0.6–4.47)
LYMPHOCYTES NFR BLD AUTO: 23 % (ref 14–44)
MCH RBC QN AUTO: 31.5 PG (ref 26.8–34.3)
MCHC RBC AUTO-ENTMCNC: 32.4 G/DL (ref 31.4–37.4)
MCV RBC AUTO: 97 FL (ref 82–98)
MONOCYTES # BLD AUTO: 0.71 THOUSAND/ÂΜL (ref 0.17–1.22)
MONOCYTES NFR BLD AUTO: 12 % (ref 4–12)
NEUTROPHILS # BLD AUTO: 3.84 THOUSANDS/ÂΜL (ref 1.85–7.62)
NEUTS SEG NFR BLD AUTO: 64 % (ref 43–75)
NITRITE UR QL STRIP: NEGATIVE
NON-SQ EPI CELLS URNS QL MICRO: ABNORMAL /HPF
NRBC BLD AUTO-RTO: 0 /100 WBCS
P AXIS: 56 DEGREES
PH UR STRIP.AUTO: 7.5 [PH]
PLATELET # BLD AUTO: 188 THOUSANDS/UL (ref 149–390)
PMV BLD AUTO: 10.1 FL (ref 8.9–12.7)
POTASSIUM SERPL-SCNC: 3.9 MMOL/L (ref 3.5–5.3)
PR INTERVAL: 146 MS
PROT SERPL-MCNC: 6.5 G/DL (ref 6.4–8.4)
PROT UR STRIP-MCNC: NEGATIVE MG/DL
QRS AXIS: -56 DEGREES
QRSD INTERVAL: 100 MS
QT INTERVAL: 410 MS
QTC INTERVAL: 490 MS
RBC # BLD AUTO: 3.62 MILLION/UL (ref 3.81–5.12)
RBC #/AREA URNS AUTO: ABNORMAL /HPF
SODIUM SERPL-SCNC: 138 MMOL/L (ref 135–147)
SP GR UR STRIP.AUTO: 1.01 (ref 1–1.03)
T WAVE AXIS: 43 DEGREES
TSH SERPL DL<=0.05 MIU/L-ACNC: 4.17 UIU/ML (ref 0.45–4.5)
UROBILINOGEN UR QL STRIP.AUTO: 1 E.U./DL
VENTRICULAR RATE: 86 BPM
WBC # BLD AUTO: 6.01 THOUSAND/UL (ref 4.31–10.16)
WBC #/AREA URNS AUTO: ABNORMAL /HPF

## 2024-02-10 PROCEDURE — 36415 COLL VENOUS BLD VENIPUNCTURE: CPT | Performed by: EMERGENCY MEDICINE

## 2024-02-10 PROCEDURE — 85025 COMPLETE CBC W/AUTO DIFF WBC: CPT | Performed by: EMERGENCY MEDICINE

## 2024-02-10 PROCEDURE — 81001 URINALYSIS AUTO W/SCOPE: CPT | Performed by: EMERGENCY MEDICINE

## 2024-02-10 PROCEDURE — 80053 COMPREHEN METABOLIC PANEL: CPT | Performed by: EMERGENCY MEDICINE

## 2024-02-10 PROCEDURE — 99285 EMERGENCY DEPT VISIT HI MDM: CPT | Performed by: EMERGENCY MEDICINE

## 2024-02-10 PROCEDURE — 99285 EMERGENCY DEPT VISIT HI MDM: CPT

## 2024-02-10 PROCEDURE — 93005 ELECTROCARDIOGRAM TRACING: CPT

## 2024-02-10 PROCEDURE — 84443 ASSAY THYROID STIM HORMONE: CPT | Performed by: EMERGENCY MEDICINE

## 2024-02-10 RX ORDER — CEPHALEXIN 500 MG/1
500 CAPSULE ORAL EVERY 12 HOURS SCHEDULED
Qty: 14 CAPSULE | Refills: 0 | Status: SHIPPED | OUTPATIENT
Start: 2024-02-10 | End: 2024-02-17

## 2024-02-10 RX ORDER — CEPHALEXIN 250 MG/1
500 CAPSULE ORAL ONCE
Status: COMPLETED | OUTPATIENT
Start: 2024-02-10 | End: 2024-02-10

## 2024-02-10 RX ADMIN — CEPHALEXIN 500 MG: 250 CAPSULE ORAL at 07:20

## 2024-02-10 NOTE — ED PROVIDER NOTES
History  Chief Complaint   Patient presents with    Altered Mental Status     Patient presents from [facility] for increased AMS. Staff reports patient has had increased confusion for 2 days. Nursing home staff reports patient has a UTI. Patient from the memory care unit.      Marian Burger is a 89 y.o. year old female with PMH of PAF on eliquis, HTN, dementia presenting to the [facility] ED for evaluation of agitation. Patient reportedly yelling in room and delusional for past two nights according to nursing home staff. Patient reportedly not sleeping as much. Staff performed UA at facility which was positive for nitrites prompting referral to emergency department.  Patient reported to have stable vitals at facility. No reported vomiting or abdominal pain. No reported fevers. Patient reporting bilateral lower leg pain which is chronic.  In the emergency department patient reporting hoarse voice though denying any other complaints.  History is significantly limited due to dementia.      History provided by:  Medical records and nursing home  History limited by:  Dementia   used: No    Altered Mental Status      Prior to Admission Medications   Prescriptions Last Dose Informant Patient Reported? Taking?   Blood Pressure Monitoring (Blood Pressure Kit) KAREN   No No   Sig: Use 1 Device 2 (two) times a day   Patient not taking: Reported on 5/10/2023   Magnesium Hydroxide (MILK OF MAGNESIA PO)   Yes No   Sig: Take 30 mL by mouth daily as needed (Constipation). Indications: Constipation   Multiple Vitamins-Minerals (ICAPS AREDS FORMULA PO)   Yes No   Si (two) times a day   Multiple Vitamins-Minerals (PRESERVISION AREDS PO)   Yes No   Sig: Take 1 caplet by mouth 2 (two) times a day   acetaminophen (TYLENOL) 325 mg tablet   No No   Sig: Take 2 tablets (650 mg total) by mouth every 6 (six) hours as needed for mild pain, fever or headaches   amLODIPine (NORVASC) 5 mg tablet   No No   Sig: Take 1 tablet  (5 mg total) by mouth daily   amLODIPine (NORVASC) 5 mg tablet   Yes No   Sig: Take 5 mg by mouth daily. Indications: High Blood Pressure Disorder   apixaban (ELIQUIS) 2.5 mg   No No   Sig: Take 1 tablet (2.5 mg total) by mouth 2 (two) times a day   carboxymethylcellulose (Refresh Tears) 0.5 % SOLN   Yes No   Sig: Administer 1 drop to both eyes 3 (three) times a day. Indications: Excessive Cornea and Conjunctiva Dryness   clonazePAM (KlonoPIN) 0.5 mg tablet   Yes No   Si.5 mg 2 (two) times a day   docusate sodium (COLACE) 100 mg capsule   No No   Sig: Take 1 capsule (100 mg total) by mouth daily Do not start before 2023.   loperamide (IMODIUM A-D) 2 MG tablet   Yes No   Sig: Take 4 mg by mouth as needed for diarrhea. Indications: Diarrhea   metoprolol succinate (TOPROL-XL) 25 mg 24 hr tablet   No No   Sig: Take 1 tablet (25 mg total) by mouth daily Do not start before 2023.   omeprazole (PriLOSEC) 20 mg delayed release capsule   Yes No   Sig: Take 20 mg by mouth every morning   polyethylene glycol (GLYCOLAX) 17 GM/SCOOP powder   Yes No   Sig: Take 17 g by mouth daily. Indications: Constipation   sertraline (ZOLOFT) 25 mg tablet   Yes No   Sig: Take 25 mg by mouth daily in the early morning   traZODone (DESYREL) 50 mg tablet   Yes No   Si mg daily at bedtime      Facility-Administered Medications: None       Past Medical History:   Diagnosis Date    Dysphagia     GERD (gastroesophageal reflux disease)        Past Surgical History:   Procedure Laterality Date    CATARACT EXTRACTION Bilateral     [facility] area    CHOLECYSTECTOMY      EGD      x2 and was dilated    HYSTERECTOMY         Family History   Family history unknown: Yes     I have reviewed and agree with the history as documented.    E-Cigarette/Vaping    E-Cigarette Use Never User      E-Cigarette/Vaping Substances    Nicotine No     THC No     CBD No     Flavoring No     Other No     Unknown No      Social History      Tobacco Use    Smoking status: Never    Smokeless tobacco: Never   Vaping Use    Vaping status: Never Used   Substance Use Topics    Alcohol use: Not Currently    Drug use: Never       Review of Systems   Unable to perform ROS: Dementia       Physical Exam  Physical Exam  Vitals and nursing note reviewed.   Constitutional:       General: She is not in acute distress.     Appearance: Normal appearance. She is well-developed. She is not ill-appearing, toxic-appearing or diaphoretic.   HENT:      Head: Normocephalic and atraumatic.      Nose: No congestion or rhinorrhea.   Eyes:      General:         Right eye: No discharge.         Left eye: No discharge.   Cardiovascular:      Rate and Rhythm: Normal rate and regular rhythm.   Pulmonary:      Effort: Pulmonary effort is normal. No accessory muscle usage or respiratory distress.      Breath sounds: Normal breath sounds. No stridor. No decreased breath sounds, wheezing, rhonchi or rales.   Abdominal:      General: There is no distension.      Palpations: Abdomen is soft.      Tenderness: There is no abdominal tenderness. There is no right CVA tenderness, left CVA tenderness, guarding or rebound.   Musculoskeletal:      Cervical back: Normal range of motion and neck supple. No rigidity.      Right lower leg: No tenderness. No edema.      Left lower leg: No tenderness. No edema.   Skin:     Capillary Refill: Capillary refill takes less than 2 seconds.      Findings: No rash.   Neurological:      Mental Status: She is alert. Mental status is at baseline. She is confused.      GCS: GCS eye subscore is 4. GCS verbal subscore is 4. GCS motor subscore is 6.      Sensory: Sensation is intact.      Motor: Motor function is intact.      Comments: 5/5 strength b/l UE/LE.  Sensation grossly intact throughout.    Psychiatric:         Mood and Affect: Mood normal.         Behavior: Behavior normal.         Vital Signs  ED Triage Vitals [02/10/24 0259]   Temperature Pulse  Respirations Blood Pressure SpO2   97.9 °F (36.6 °C) 91 18 (!) 188/74 97 %      Temp Source Heart Rate Source Patient Position - Orthostatic VS BP Location FiO2 (%)   Temporal Monitor Sitting Left arm --      Pain Score       --           Vitals:    02/10/24 0400 02/10/24 0500 02/10/24 0545 02/10/24 0600   BP: (!) 203/91 (!) 230/176 165/79 161/79   Pulse: 98 (!) 135 87 90   Patient Position - Orthostatic VS: Sitting  Lying Lying         Visual Acuity      ED Medications  Medications   cephalexin (KEFLEX) capsule 500 mg (500 mg Oral Given 2/10/24 0720)       Diagnostic Studies  Results Reviewed       Procedure Component Value Units Date/Time    Urine Microscopic [[id number]]  (Abnormal) Collected: 02/10/24 0504    Lab Status: Final result Specimen: Urine, Straight Cath Updated: 02/10/24 0543     RBC, UA 0-1 /hpf      WBC, UA 2-4 /hpf      Epithelial Cells None Seen /hpf      Bacteria, UA Innumerable /hpf     UA w Reflex to Microscopic w Reflex to Culture [[id number]]  (Abnormal) Collected: 02/10/24 0504    Lab Status: Final result Specimen: Urine, Straight Cath Updated: 02/10/24 0537     Color, UA Yellow     Clarity, UA Slightly Cloudy     Specific Gravity, UA 1.010     pH, UA 7.5     Leukocytes, UA Trace     Nitrite, UA Negative     Protein, UA Negative mg/dl      Glucose, UA Negative mg/dl      Ketones, UA Negative mg/dl      Urobilinogen, UA 1.0 E.U./dl      Bilirubin, UA Negative     Occult Blood, UA Trace-lysed    TSH, 3rd generation with Free T4 reflex [[id number]]  (Normal) Collected: 02/10/24 0321    Lab Status: Final result Specimen: Blood from Arm, Left Updated: 02/10/24 0403     TSH 3RD GENERATON 4.168 uIU/mL     Comprehensive metabolic panel [[id number]]  (Abnormal) Collected: 02/10/24 0321    Lab Status: Final result Specimen: Blood from Arm, Left Updated: 02/10/24 0351     Sodium 138 mmol/L      Potassium 3.9 mmol/L      Chloride 103 mmol/L      CO2 29 mmol/L      ANION GAP 6 mmol/L      BUN 17 mg/dL       Creatinine 0.48 mg/dL      Glucose 98 mg/dL      Calcium 8.7 mg/dL      AST 27 U/L      ALT 30 U/L      Alkaline Phosphatase 53 U/L      Total Protein 6.5 g/dL      Albumin 3.7 g/dL      Total Bilirubin 0.38 mg/dL      eGFR 86 ml/min/1.73sq m     Narrative:      National Kidney Disease Foundation guidelines for Chronic Kidney Disease (CKD):     Stage 1 with normal or high GFR (GFR > 90 mL/min/1.73 square meters)    Stage 2 Mild CKD (GFR = 60-89 mL/min/1.73 square meters)    Stage 3A Moderate CKD (GFR = 45-59 mL/min/1.73 square meters)    Stage 3B Moderate CKD (GFR = 30-44 mL/min/1.73 square meters)    Stage 4 Severe CKD (GFR = 15-29 mL/min/1.73 square meters)    Stage 5 End Stage CKD (GFR <15 mL/min/1.73 square meters)  Note: GFR calculation is accurate only with a steady state creatinine    CBC and differential [[id number]]  (Abnormal) Collected: 02/10/24 0321    Lab Status: Final result Specimen: Blood from Arm, Left Updated: 02/10/24 0332     WBC 6.01 Thousand/uL      RBC 3.62 Million/uL      Hemoglobin 11.4 g/dL      Hematocrit 35.2 %      MCV 97 fL      MCH 31.5 pg      MCHC 32.4 g/dL      RDW 14.0 %      MPV 10.1 fL      Platelets 188 Thousands/uL      nRBC 0 /100 WBCs      Neutrophils Relative 64 %      Immat GRANS % 0 %      Lymphocytes Relative 23 %      Monocytes Relative 12 %      Eosinophils Relative 1 %      Basophils Relative 0 %      Neutrophils Absolute 3.84 Thousands/µL      Immature Grans Absolute 0.02 Thousand/uL      Lymphocytes Absolute 1.35 Thousands/µL      Monocytes Absolute 0.71 Thousand/µL      Eosinophils Absolute 0.07 Thousand/µL      Basophils Absolute 0.02 Thousands/µL                    No orders to display              Procedures  Procedures         ED Course  ED Course as of 02/10/24 0806   Sat Feb 10, 2024   0330 Procedure Note: EKG  Date/Time: 02/10/24 3:30 AM   Interpreted by: Adin Wahl DO  Indications / Diagnosis: Altered Mental Status  ECG reviewed by me, the ED Provider:  yes   The EKG demonstrates:  Rhythm: normal sinus rhythm 86 BPM  Intervals: Normal MT and QT intervals  Axis: Left axis  QRS/Blocks: Incomplete LBBB  ST Changes: No acute ST/T waves changes. No STEWART. No TWI.   0552 Bacteria, UA(!): Innumerable                               SBIRT 20yo+      Flowsheet Row Most Recent Value   Initial Alcohol Screen: US AUDIT-C     1. How often do you have a drink containing alcohol? 0 Filed at: 02/10/2024 0303   2. How many drinks containing alcohol do you have on a typical day you are drinking?  0 Filed at: 02/10/2024 0303   3a. Male UNDER 65: How often do you have five or more drinks on one occasion? 0 Filed at: 02/10/2024 0303   3b. FEMALE Any Age, or MALE 65+: How often do you have 4 or more drinks on one occassion? 0 Filed at: 02/10/2024 0303   Audit-C Score 0 Filed at: 02/10/2024 0303   TING: How many times in the past year have you...    Used an illegal drug or used a prescription medication for non-medical reasons? Never Filed at: 02/10/2024 0303                      Medical Decision Making    89 y.o. female presenting for evaluation of agitation and possible UTI.  VSS, at baseline mental status on exam.  UA at nursing home positive for nitrite, suspect presentation due to UTI.  Will check labs to screen for anemia, leukocytosis, electrolyte abnormality or thyroid disease.  Will order EKG to screen for arrhythmia.     UA obtained via straight catheterization demonstrating innumerable bacteria.  Will treat with course of cephalexin for UTI.  This plan was directly communicated to nursing facility staff.    Patient given written AVS with RTED precautions if staff notices lethargy, decreased urine output, dehydration, fevers, weakness, vomiting, abdominal pain or any other symptoms on concern. The patient was also provided written a written after visit summary (AVS) with return precautions. I have also provided contact information for patient to follow up with their PCP.     Amount  and/or Complexity of Data Reviewed  Labs: ordered. Decision-making details documented in ED Course.    Risk  Prescription drug management.             Disposition  Final diagnoses:   UTI (urinary tract infection)   Dementia (HCC)     Time reflects when diagnosis was documented in both MDM as applicable and the Disposition within this note       Time User Action Codes Description Comment    2/10/2024  4:07 AM Adin Wahl [N39.0] UTI (urinary tract infection)     2/10/2024  4:07 AM Adin aWhl [F03.90] Dementia (HCC)           ED Disposition       ED Disposition   Discharge    Condition   Stable    Date/Time   Sat Feb 10, 2024 0557    Comment   Marian Burger discharge to home/self care.                   Follow-up Information       Follow up With Specialties Details Why Contact Info    Eulogio Harris MD Emergency Medicine, Internal Medicine Schedule an appointment as soon as possible for a visit  To make appointment for reevaluation in 3-5 days. [address]  [facility] [address]  [phone]              Patient's Medications   Discharge Prescriptions    CEPHALEXIN (KEFLEX) 500 MG CAPSULE    Take 1 capsule (500 mg total) by mouth every 12 (twelve) hours for 7 days       Start Date: 2/10/2024 End Date: 2/17/2024       Order Dose: 500 mg       Quantity: 14 capsule    Refills: 0       No discharge procedures on file.    PDMP Review       None            ED Provider  Electronically Signed by             Adin Wahl DO  02/10/24 0806

## 2024-02-10 NOTE — DISCHARGE INSTRUCTIONS
Marian KENYON Jennyfer has been seen for evaluation of change in mental status. A urine sample was consistent with a UTI. Please complete the course of antibiotics as prescribed. Return to the emergency department if you notice lethargy, decreased urine output, dehydration, fevers, weakness, vomiting, abdominal pain or any other symptoms of concern. Please follow up with their PCP by calling the number provided.

## 2024-02-12 LAB
ATRIAL RATE: 86 BPM
P AXIS: 56 DEGREES
PR INTERVAL: 146 MS
QRS AXIS: -56 DEGREES
QRSD INTERVAL: 100 MS
QT INTERVAL: 410 MS
QTC INTERVAL: 490 MS
T WAVE AXIS: 43 DEGREES
VENTRICULAR RATE: 86 BPM

## 2024-05-11 LAB
